# Patient Record
Sex: FEMALE | Race: WHITE | Employment: OTHER | URBAN - METROPOLITAN AREA
[De-identification: names, ages, dates, MRNs, and addresses within clinical notes are randomized per-mention and may not be internally consistent; named-entity substitution may affect disease eponyms.]

---

## 2017-01-05 ENCOUNTER — NURSING HOME VISIT (OUTPATIENT)
Dept: GERIATRICS | Facility: CLINIC | Age: 82
End: 2017-01-05
Payer: COMMERCIAL

## 2017-01-05 VITALS
WEIGHT: 141 LBS | TEMPERATURE: 98.9 F | BODY MASS INDEX: 25.78 KG/M2 | SYSTOLIC BLOOD PRESSURE: 134 MMHG | RESPIRATION RATE: 18 BRPM | HEART RATE: 79 BPM | OXYGEN SATURATION: 95 % | DIASTOLIC BLOOD PRESSURE: 80 MMHG

## 2017-01-05 DIAGNOSIS — R11.11 VOMITING WITHOUT NAUSEA, INTRACTABILITY OF VOMITING NOT SPECIFIED, UNSPECIFIED VOMITING TYPE: Primary | ICD-10-CM

## 2017-01-05 PROCEDURE — 99207 ZZC CDG-CORRECTLY CODED, REVIEWED AND AGREE: CPT | Performed by: NURSE PRACTITIONER

## 2017-01-05 PROCEDURE — 99308 SBSQ NF CARE LOW MDM 20: CPT | Performed by: NURSE PRACTITIONER

## 2017-01-05 RX ORDER — CALCIUM CARBONATE 500 MG/1
2 TABLET, CHEWABLE ORAL 3 TIMES DAILY PRN
COMMUNITY
End: 2017-10-01

## 2017-01-05 NOTE — PROGRESS NOTES
[place] [facility]    Chief Complaint   Patient presents with     Vomiting       HPI:    Maribel Sevilla is a 85 year old  (1931), who is being seen today for an episodic care visit at [facility].     Today's concern is:  Vomiting without nausea, intractability of vomiting not specified, unspecified vomiting type  Patient with increasing episodes of vomiting with meals; seen by SLP to assess for patient difficulty tolerating specific textures - no noted difficulty per SLP  Previously decreased patient's Prilosec per pharmacy recommendation - GERD may be contributing to current increased vomitting    BP Readin/70, 101/54, 116/64    REVIEW OF SYSTEMS:  4 point ROS including Respiratory, CV, GI and , other than that noted in the HPI,  is negative    /80 mmHg  Pulse 79  Temp(Src) 98.9  F (37.2  C)  Resp 18  Wt 141 lb (63.957 kg)  SpO2 95%  GENERAL APPEARANCE:  Alert, in no distress  Abdomen: Abdomen soft, non-tender. BS normal. No masses, organomegaly      ASSESSMENT/PLAN:  Vomiting without nausea, intractability of vomiting not specified, unspecified vomiting type  Prilosec 20mg BID  Monitor for ongoing vomitting    ASCENCION Rm CNP

## 2017-01-18 ENCOUNTER — TRANSFERRED RECORDS (OUTPATIENT)
Dept: HEALTH INFORMATION MANAGEMENT | Facility: CLINIC | Age: 82
End: 2017-01-18

## 2017-01-18 LAB
BUN SERPL-MCNC: 33 MG/DL (ref 9–26)
CALCIUM SERPL-MCNC: 9.5 MG/DL (ref 8.4–10.2)
CHLORIDE SERPLBLD-SCNC: 110 MMOL/L (ref 98–109)
CO2 SERPL-SCNC: 25 MMOL/L (ref 22–31)
CREAT SERPL-MCNC: 3.13 MG/DL (ref 0.55–1.02)
DIFFERENTIAL: ABNORMAL
ERYTHROCYTE [DISTWIDTH] IN BLOOD BY AUTOMATED COUNT: 12.9 % (ref 11–15)
GFR SERPL CREATININE-BSD FRML MDRD: 13 ML/MIN/1.73M2
GLUCOSE SERPL-MCNC: 98 MG/DL (ref 70–100)
HCT VFR BLD AUTO: 31 % (ref 35–46)
HEMOGLOBIN: 9.8 G/DL (ref 11.8–15.5)
MCV RBC AUTO: 97.8 FL (ref 80–100)
PLATELET # BLD AUTO: 215 K/CMM (ref 140–450)
POTASSIUM SERPL-SCNC: 4.2 MMOL/L (ref 3.5–5.2)
RBC # BLD AUTO: 3.17 M/CMM (ref 3.7–5.2)
SODIUM SERPL-SCNC: 144 MMOL/L (ref 136–145)
WBC # BLD AUTO: 5.1 K/CMM (ref 3.8–11)

## 2017-01-20 ENCOUNTER — NURSING HOME VISIT (OUTPATIENT)
Dept: GERIATRICS | Facility: CLINIC | Age: 82
End: 2017-01-20
Payer: COMMERCIAL

## 2017-01-20 ENCOUNTER — TRANSFERRED RECORDS (OUTPATIENT)
Dept: HEALTH INFORMATION MANAGEMENT | Facility: CLINIC | Age: 82
End: 2017-01-20

## 2017-01-20 VITALS
HEART RATE: 68 BPM | BODY MASS INDEX: 26.15 KG/M2 | WEIGHT: 143 LBS | OXYGEN SATURATION: 97 % | RESPIRATION RATE: 18 BRPM | SYSTOLIC BLOOD PRESSURE: 118 MMHG | DIASTOLIC BLOOD PRESSURE: 69 MMHG | TEMPERATURE: 96.5 F

## 2017-01-20 DIAGNOSIS — N18.4 CKD (CHRONIC KIDNEY DISEASE) STAGE 4, GFR 15-29 ML/MIN (H): ICD-10-CM

## 2017-01-20 DIAGNOSIS — R11.15 INTRACTABLE CYCLICAL VOMITING WITHOUT NAUSEA: Primary | ICD-10-CM
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
## 2017-01-20 LAB
BUN SERPL-MCNC: 25 MG/DL (ref 9–26)
CALCIUM SERPL-MCNC: 9.6 MG/DL (ref 8.4–10.2)
CHLORIDE SERPLBLD-SCNC: 112 MMOL/L (ref 98–109)
CO2 SERPL-SCNC: 27 MMOL/L (ref 22–31)
CREAT SERPL-MCNC: 2.31 MG/DL (ref 0.55–1.02)
GFR SERPL CREATININE-BSD FRML MDRD: 19 ML/MIN/1.73M2
GLUCOSE SERPL-MCNC: 121 MG/DL (ref 70–100)
POTASSIUM SERPL-SCNC: 4.4 MMOL/L (ref 3.5–5.2)
SODIUM SERPL-SCNC: 146 MMOL/L (ref 136–145)

## 2017-01-20 PROCEDURE — 99207 ZZC CDG-CORRECTLY CODED, REVIEWED AND AGREE: CPT | Performed by: NURSE PRACTITIONER

## 2017-01-20 PROCEDURE — 99309 SBSQ NF CARE MODERATE MDM 30: CPT | Performed by: NURSE PRACTITIONER

## 2017-01-20 NOTE — PROGRESS NOTES
[place] [facility]    Chief Complaint   Patient presents with     Fall     Vomiting       HPI:    Maribel Sevilla is a 85 year old  (12/20/1931), who is being seen today for an episodic care visit at [facility]. Today's concern is:  Intractable cyclical vomiting without nausea  Patient with ongoing vomiting with unknown cause; seen by SLP - r/o swallowing difficulties leading to vomiting.  Patient cannot recall the last time she had a bowel movement    Fall, subsequent encounter  Patient with repeated falls with minimal injury; suspected 2/2 dehydration given below noted BMP    CKD (chronic kidney disease) stage 4, GFR 15-29 ml/min (H)  Today's BMP showing mild improvement with fluid resuscitation    BMP from 1/18 showing decline in kidney function    Ref. Range 8/5/2016 00:00 1/18/2017 00:00   Sodium Latest Ref Range: 136-145 mmol/L 141 144   Potassium Latest Ref Range: 3.5-5.2 mmol/L 4.1 4.2   Chloride Latest Ref Range:  mmol/L 108 110 (A)   Carbon Dioxide Latest Ref Range: 22-31 mmol/L  25   Urea Nitrogen Latest Ref Range: 9-26 mg/dL 32 33 (A)   Creatinine Latest Ref Range: 0.55-1.02 mg/dL 2.36 3.13 (A)   GFR Estimate Latest Ref Range: >60 ml/min/1.73m2  13 (A)   GFR Estimate If Black Latest Ref Range: >60 ml/min/1.73m2 24 15 (A)   GFR Estimated (External) Unknown 21          ALLERGIES: Review of patient's allergies indicates no known allergies.  Past Medical, Surgical, Family and Social History reviewed and updated in [facility].    Current Outpatient Prescriptions   Medication Sig Dispense Refill     calcium carbonate (TUMS) 500 MG chewable tablet Take 2 chew tab by mouth 3 times daily as needed        omeprazole (PRILOSEC) 10 MG CR capsule Take 20 mg by mouth 2 times daily        lamoTRIgine (LAMICTAL) 200 MG tablet Take 300 mg by mouth daily & start (400mg) daily on 9/29/16.       mineral oil liquid Place into both ears At Bedtime 2 gtts to bilateral ears       QUEtiapine  (SEROQUEL) 50 MG tablet Take 200 mg by mouth At Bedtime        mirtazapine (REMERON) 15 MG tablet Take 30 mg by mouth At Bedtime        acetaminophen (TYLENOL) 500 MG tablet Take 500 mg by mouth 2 times daily and 1 tab every 4 hours as needed       senna-docusate (SENNA S) 8.6-50 MG per tablet Take 2 tablets by mouth At Bedtime       donepezil (ARICEPT) 5 MG tablet Take 10 mg by mouth daily  30 tablet 0     Cholecalciferol (VITAMIN D PO) Take 5,000 Units by mouth daily.       polyethylene glycol (MIRALAX/GLYCOLAX) packet Take 17 g by mouth daily. 28 each 1     Multiple Vitamins-Minerals (OCUVITE ADULT FORMULA) CAPS Take 1 tablet by mouth daily        Medications reviewed:  Medications reconciled to facility chart and changes were made to reflect current medications as identified as above med list. Below are the changes that were made:   Medications stopped since last EPIC medication reconciliation:   There are no discontinued medications.    Medications started since last [facility] medication reconciliation:  No orders of the defined types were placed in this encounter.     REVIEW OF SYSTEMS:  4 point ROS including Respiratory, CV, GI and , other than that noted in the HPI,  is negative    Physical Exam:  /69 mmHg  Pulse 68  Temp(Src) 96.5  F (35.8  C)  Resp 18  Wt 143 lb (64.864 kg)  SpO2 97%     BP Readin/77, 117/73, 104/59  GENERAL APPEARANCE:  Alert, in no distress, oriented, cooperative elderly woman laying in bed  ENT:  Mouth and posterior oropharynx normal, moist mucous membranes, [place]  EYES:  EOM, conjunctivae, lids, pupils and irises normal, wears glasses  NECK:  No adenopathy, masses or thyromegaly  RESP:  Respiratory effort and palpation of chest normal, lungs clear to auscultation, no respiratory distress  CV:  Palpation and auscultation of heart done, regular rate and rhythm, no murmur, rub, or gallop, no edema, +2 pedal pulses  ABDOMEN: Hyperactive upper bowel sounds, hypoactive lower  bowel sounds, soft, nontender, no hepatosplenomegaly or other masses  M/S:   Gait and station normal; Digits and nails abnormal - arthritic changes present  NEURO:   Cranial nerves 2-12 are normal tested and grossly at patient's baseline  PSYCH:  oriented X 3, normal insight, judgement and memory, affect and mood concerned    Recent Labs:      Last Basic Metabolic Panel:  NA      144   1/18/2017   POTASSIUM      4.2   1/18/2017  CHLORIDE      110   1/18/2017  RILEY      9.5   1/18/2017  CO2       25   1/18/2017  BUN       33   1/18/2017  CR     3.13   1/18/2017  GLC       98   1/18/2017    WBC      5.1   1/18/2017  RBC     3.17   1/18/2017  HGB      9.8   1/18/2017  HCT     31.0   1/18/2017  MCV     97.8   1/18/2017  MCH     31.3   9/2/2015  MCHC     32.6   9/2/2015  RDW     12.9   1/18/2017  PLT      215   1/18/2017      Assessment/Plan:  Intractable cyclical vomiting without nausea  Zofran 4mg q6h PRN  Abd Xray to r/o constipation    Fall, subsequent encounter  Monitor patient closely; SBA with avtivities/mobility/transferring    CKD (chronic kidney disease) stage 4, GFR 15-29 ml/min (H)  NaCl 0.9% @ 75cc/hr   Recheck BMP after completion      Electronically signed by  ASCENCION Rm CNP

## 2017-01-23 ENCOUNTER — TRANSFERRED RECORDS (OUTPATIENT)
Dept: HEALTH INFORMATION MANAGEMENT | Facility: CLINIC | Age: 82
End: 2017-01-23

## 2017-01-23 LAB
BUN SERPL-MCNC: 22 MG/DL (ref 9–26)
CALCIUM SERPL-MCNC: 10 MG/DL (ref 8.4–10.2)
CHLORIDE SERPLBLD-SCNC: 111 MMOL/L (ref 98–109)
CO2 SERPL-SCNC: 23 MMOL/L (ref 22–31)
CREAT SERPL-MCNC: 2.25 MG/DL (ref 0.55–1.02)
GFR SERPL CREATININE-BSD FRML MDRD: 19 ML/MIN/1.73M2
GLUCOSE SERPL-MCNC: 129 MG/DL (ref 70–100)
POTASSIUM SERPL-SCNC: 4.1 MMOL/L (ref 3.5–5.2)
SODIUM SERPL-SCNC: 145 MMOL/L (ref 136–145)

## 2017-01-30 PROBLEM — R11.15 INTRACTABLE CYCLICAL VOMITING WITHOUT NAUSEA: Status: ACTIVE | Noted: 2017-01-30
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
## 2017-02-03 RX ORDER — ONDANSETRON 4 MG/1
4 TABLET, FILM COATED ORAL 4 TIMES DAILY
COMMUNITY
End: 2024-09-17

## 2017-02-04 RX ORDER — GUAIFENESIN/DEXTROMETHORPHAN 100-10MG/5
5 SYRUP ORAL EVERY 4 HOURS PRN
Qty: 560 ML | Status: CANCELLED
Start: 2017-02-04

## 2017-02-06 ENCOUNTER — NURSING HOME VISIT (OUTPATIENT)
Dept: GERIATRICS | Facility: CLINIC | Age: 82
End: 2017-02-06
Payer: COMMERCIAL

## 2017-02-06 ENCOUNTER — TRANSFERRED RECORDS (OUTPATIENT)
Dept: HEALTH INFORMATION MANAGEMENT | Facility: CLINIC | Age: 82
End: 2017-02-06

## 2017-02-06 VITALS
OXYGEN SATURATION: 96 % | HEART RATE: 80 BPM | DIASTOLIC BLOOD PRESSURE: 78 MMHG | SYSTOLIC BLOOD PRESSURE: 121 MMHG | RESPIRATION RATE: 20 BRPM | TEMPERATURE: 96.5 F

## 2017-02-06 DIAGNOSIS — R11.10 VOMITING, INTRACTABILITY OF VOMITING NOT SPECIFIED, PRESENCE OF NAUSEA NOT SPECIFIED, UNSPECIFIED VOMITING TYPE: Primary | ICD-10-CM

## 2017-02-06 DIAGNOSIS — N18.4 CHRONIC KIDNEY DISEASE, STAGE IV (SEVERE) (H): ICD-10-CM

## 2017-02-06 DIAGNOSIS — F31.9 BIPOLAR AFFECTIVE DISORDER, REMISSION STATUS UNSPECIFIED (H): ICD-10-CM

## 2017-02-06 LAB
BUN SERPL-MCNC: 31 MG/DL (ref 9–26)
CALCIUM SERPL-MCNC: 9.7 MG/DL (ref 8.4–10.2)
CHLORIDE SERPLBLD-SCNC: 109 MMOL/L (ref 98–109)
CO2 SERPL-SCNC: 25 MMOL/L (ref 22–31)
CREAT SERPL-MCNC: 2.77 MG/DL (ref 0.55–1.02)
GFR SERPL CREATININE-BSD FRML MDRD: 15 ML/MIN/1.73M2
GLUCOSE SERPL-MCNC: 111 MG/DL (ref 70–100)
POTASSIUM SERPL-SCNC: 4.4 MMOL/L (ref 3.5–5.2)
SODIUM SERPL-SCNC: 144 MMOL/L (ref 136–145)

## 2017-02-06 PROCEDURE — 99207 ZZC CDG-CORRECTLY CODED, REVIEWED AND AGREE: CPT | Performed by: INTERNAL MEDICINE

## 2017-02-06 PROCEDURE — 99309 SBSQ NF CARE MODERATE MDM 30: CPT | Performed by: INTERNAL MEDICINE

## 2017-02-06 NOTE — PROGRESS NOTES
[place] [facility]  Nursing Home Regulatory Visit  February 6, 2017    Chief Complaint   Patient presents with     [facility] Regulatory       HPI:    Maribel Sevilla is a 85 year old  (12/20/1931), who is being seen today for a federally mandated E/M visit at [facility]. Today's concerns are:    1) Emesis -- occurs after meals. Nursing has been giving ondansetron 45 min prior to meals and seems to help. No emesis other times of the day. Only med change since I last saw her is increase in quetiapine from 150 mg qhs to 200 mg qhs as well as PPI changed to H2 blocker.   2) CKD, Stage IV -- baseline Cr has increased from 1.6-1.9 to 2.2-2.5 in past year. GFR around 19. Unclear etiology of her CKD - no DM nor HTN.   3) Bipolar Disorder -- cycling down right now, spending more time in bed - her usual cyclical pattern. Follows with in-house psychiatry and managed with lamotrigine 400 mg qhs, mirtazapine 30 mg qhs and quetiapine 200 mg qhs    ALLERGIES: Review of patient's allergies indicates no known allergies.    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 Mother      ?     C.A.D. Father      ?     C.A.D. Sister      ?     C.A.D. Brother      ?     Psychotic Disorder Son      suicide       SOCIAL HISTORY:   Lives in a SNF    MEDICATIONS:  Current Outpatient Prescriptions   Medication Sig Dispense Refill     ranitidine (RANITIDINE) 150 MG tablet Take 150 mg by mouth daily       ondansetron  (ZOFRAN) 4 MG tablet Take by mouth 4 times daily       calcium carbonate (TUMS) 500 MG chewable tablet Take 2 chew tab by mouth 3 times daily as needed        lamoTRIgine (LAMICTAL) 200 MG tablet Take 300 mg by mouth daily & start (400mg) daily on 9/29/16.       mineral oil liquid Place into both ears At Bedtime 2 gtts to bilateral ears       QUEtiapine (SEROQUEL) 50 MG tablet Take 200 mg by mouth At Bedtime        mirtazapine (REMERON) 15 MG tablet Take 30 mg by mouth At Bedtime        acetaminophen (TYLENOL) 500 MG tablet Take 500 mg by mouth 2 times daily and 1 tab every 4 hours as needed       senna-docusate (SENNA S) 8.6-50 MG per tablet Take 2 tablets by mouth At Bedtime       donepezil (ARICEPT) 5 MG tablet Take 10 mg by mouth daily  30 tablet 0     Cholecalciferol (VITAMIN D PO) Take 5,000 Units by mouth daily.       polyethylene glycol (MIRALAX/GLYCOLAX) packet Take 17 g by mouth daily. 28 each 1     Multiple Vitamins-Minerals (OCUVITE ADULT FORMULA) CAPS Take 1 tablet by mouth daily          Medications reviewed:  Medications reconciled to facility chart and changes were made to reflect current medications as identified as above med list. Below are the changes that were made:   Medications stopped since last EPIC medication reconciliation:   Medications Discontinued During This Encounter   Medication Reason     omeprazole (PRILOSEC) 10 MG CR capsule Discontinued by another Health Care Provider     Medications started since last [facility] medication reconciliation:  Orders Placed This Encounter   Medications     ranitidine (RANITIDINE) 150 MG tablet     Sig: Take 150 mg by mouth daily     ondansetron (ZOFRAN) 4 MG tablet     Sig: Take by mouth 4 times daily       Case Management:  I have reviewed the care plan and MDS and do agree with the plan.   Information reviewed:  Medications, vital signs, orders, and nursing notes.    ROS:  Unable to be obtained due to cognitive impairment or aphasia.     PHYSICAL  EXAM:  /78 mmHg  Pulse 80  Temp(Src) 96.5  F (35.8  C)  Resp 20  SpO2 96%  Gen: laying in bed, alert, cooperative and in no acute distress  Card: RRR, S1, S2, no murmurs  Resp: lungs clear to auscultation bilaterally  GI: abdomen soft, not-tender  Ext: no LE edema  Neuro: CX II-XII grossly in tact; ROM in all four extremities grossly in tact  Psych: memory, judgement and insight impaired    Lab/Diagnostic data:  Reviewed as per Epic    ASSESSMENT/PLAN    1) Emesis   Occurs after meals. Nursing has been giving ondansetron 45 min prior to meals and seems to help. No emesis other times of the day. Differential includes secondary to medications (dose increase in quetiapine); biliary disease, gastroparesis or gastric outlet obstruction.   -- would obtain LFTs to eval for biliary process and if abnormal order abdominal U/S   -- consider upper GI/barium swallow to assess if any gastric outlet obstruction  -- consider trial of metoclopramide if daughter does not wish invasive testing  -- consider switch back to BID PPI  -- continue scheduled ondansetron as it is helping    2) CKD, Stage IV   Baseline Cr has increased from 1.6-1.9 to 2.2-2.5 in past year. GFR around 19. Unclear etiology of her CKD - no DM nor HTN. Doubt renal function is contributing to her emesis as she is not uremic.   -- would get a phosphate level  -- follow q6 mo renal labs    3) Bipolar Disorder   Cycling down right now, spending more time in bed - her usual cyclical pattern. Follows with in-house psychiatry   -- continues on lamotrigine 400 mg qhs, mirtazapine 30 mg qhs and quetiapine 200 mg qhs  -- ongoing management per psychiatry - appreciate their expertise  -- supportive cares       Electronically signed by:  Felecia Elizabeth MD

## 2017-02-09 ENCOUNTER — NURSING HOME VISIT (OUTPATIENT)
Dept: GERIATRICS | Facility: CLINIC | Age: 82
End: 2017-02-09
Payer: COMMERCIAL

## 2017-02-09 VITALS
DIASTOLIC BLOOD PRESSURE: 77 MMHG | HEART RATE: 78 BPM | TEMPERATURE: 97.1 F | OXYGEN SATURATION: 96 % | WEIGHT: 136.3 LBS | RESPIRATION RATE: 20 BRPM | SYSTOLIC BLOOD PRESSURE: 133 MMHG | BODY MASS INDEX: 24.92 KG/M2

## 2017-02-09 DIAGNOSIS — R53.83 FATIGUE DUE TO DEPRESSION: Primary | ICD-10-CM

## 2017-02-09 DIAGNOSIS — F31.4 BIPOLAR DISORDER, CURRENT EPISODE DEPRESSED, SEVERE, WITHOUT PSYCHOTIC FEATURES (H): ICD-10-CM

## 2017-02-09 DIAGNOSIS — R11.15 NON-INTRACTABLE CYCLICAL VOMITING WITHOUT NAUSEA: ICD-10-CM

## 2017-02-09 DIAGNOSIS — F32.A FATIGUE DUE TO DEPRESSION: Primary | ICD-10-CM

## 2017-02-09 PROCEDURE — 99207 ZZC CDG-CORRECTLY CODED, REVIEWED AND AGREE: CPT | Performed by: NURSE PRACTITIONER

## 2017-02-09 PROCEDURE — 99309 SBSQ NF CARE MODERATE MDM 30: CPT | Performed by: NURSE PRACTITIONER

## 2017-02-09 NOTE — PROGRESS NOTES
[place] [facility]    Chief Complaint   Patient presents with     Fatigue       HPI:    Maribel Sevilla is a 85 year old  (1931), who is being seen today for an episodic care visit at [facility].     Today's concern is:  Fatigue due to depression/Bipolar disorder, current episode depressed, severe, without psychotic features (H)  Patient appearing to be increasingly fatigued, suspect this may be 2/2 to her BPD - is followed by in-house psych; treatment can be very touchy as patient is sensitive to adjustments and becomes very sedated rather quickly with small medication changes. Was seen by them yesterday and started on Zoloft 50mg daily.    Non-intractable cyclical vomiting without nausea  Vomiting has improved with scheduling of Zofran; still remain unsure of cause  Discussion with daughter, she is interested in upper GI for further investigation of potential causes of vomiting.     BP Readin/78, 112/69, 114/73    REVIEW OF SYSTEMS:  4 point ROS including Respiratory, CV, GI and , other than that noted in the HPI,  is negative    /77 mmHg  Pulse 78  Temp(Src) 97.1  F (36.2  C)  Resp 20  Wt 136 lb 4.8 oz (61.825 kg)  SpO2 96%  GENERAL APPEARANCE:  Alert, in no distress, oriented, cooperative elderly woman laying in bed  ENT:  Mouth and posterior oropharynx normal, moist mucous membranes, [place]  EYES:  EOM, conjunctivae, lids, pupils and irises normal, wears glasses  NECK:  No adenopathy, masses or thyromegaly  RESP:  Respiratory effort and palpation of chest normal, lungs clear to auscultation, no respiratory distress  CV:  Palpation and auscultation of heart done, regular rate and rhythm, no murmur, rub, or gallop, no edema, +2 pedal pulses  ABDOMEN: Hyperactive upper bowel sounds, hypoactive lower bowel sounds, soft, nontender, no hepatosplenomegaly or other masses  M/S:   Gait and station normal; Digits and nails abnormal - arthritic changes present  NEURO:    Cranial nerves 2-12 are normal tested and grossly at patient's baseline  PSYCH:  oriented X 3, normal insight, judgement and memory, affect and mood concerned    ASSESSMENT/PLAN:  Fatigue due to depression/Bipolar disorder, current episode depressed, severe, without psychotic features (H)  Continue with POC per psych  Monitor patient behaviors with addition of Zoloft    Non-intractable cyclical vomiting without nausea  Schedule patient for EGD for further diangoses    Bri Wiley, ASCENCION CNP

## 2017-02-16 ENCOUNTER — HOSPITAL ENCOUNTER (OUTPATIENT)
Dept: GENERAL RADIOLOGY | Facility: CLINIC | Age: 82
Discharge: HOME | End: 2017-02-16
Attending: INTERNAL MEDICINE | Admitting: INTERNAL MEDICINE
Payer: COMMERCIAL

## 2017-02-16 DIAGNOSIS — R11.2 NAUSEA AND VOMITING: ICD-10-CM

## 2017-02-16 PROCEDURE — 74240 X-RAY XM UPR GI TRC 1CNTRST: CPT

## 2017-02-23 ENCOUNTER — NURSING HOME VISIT (OUTPATIENT)
Dept: GERIATRICS | Facility: CLINIC | Age: 82
End: 2017-02-23
Payer: COMMERCIAL

## 2017-02-23 VITALS
RESPIRATION RATE: 20 BRPM | OXYGEN SATURATION: 95 % | SYSTOLIC BLOOD PRESSURE: 133 MMHG | TEMPERATURE: 98 F | HEART RATE: 67 BPM | DIASTOLIC BLOOD PRESSURE: 72 MMHG

## 2017-02-23 DIAGNOSIS — Z98.890 HISTORY OF ESOPHAGOGASTRODUODENOSCOPY (EGD): ICD-10-CM

## 2017-02-23 DIAGNOSIS — F31.4 BIPOLAR DISORDER, CURRENT EPISODE DEPRESSED, SEVERE, WITHOUT PSYCHOTIC FEATURES (H): Primary | ICD-10-CM

## 2017-02-23 DIAGNOSIS — R11.15 NON-INTRACTABLE CYCLICAL VOMITING WITHOUT NAUSEA: ICD-10-CM

## 2017-02-23 PROCEDURE — 99309 SBSQ NF CARE MODERATE MDM 30: CPT | Performed by: NURSE PRACTITIONER

## 2017-02-23 NOTE — PROGRESS NOTES
[place] [facility]    Chief Complaint   Patient presents with     Vomiting       HPI:    Maribel Sevilla is a 85 year old  (12/20/1931), who is being seen today for an episodic care visit at [facility]. Today's concern is:  Bipolar disorder, current episode depressed, severe, without psychotic features (H)  Patient doing well today, has been up out of bed    Non-intractable cyclical vomiting without nausea/History of esophagogastroduodenoscopy (EGD)  Upper GI completed on 2/16 showing no evidence of gastric outlet obstruction potentially causing vomiting.  Patient's vomiting much improved, she is requesting to have scheduled Zofran changed to PRN      ALLERGIES: Review of patient's allergies indicates no known allergies.  Past Medical, Surgical, Family and Social History reviewed and updated in [facility].    Current Outpatient Prescriptions   Medication Sig Dispense Refill     sertraline (ZOLOFT) 50 MG tablet Take 50 mg by mouth daily       ranitidine (RANITIDINE) 150 MG tablet Take 150 mg by mouth daily       ondansetron (ZOFRAN) 4 MG tablet Take 4 mg by mouth 4 times daily        calcium carbonate (TUMS) 500 MG chewable tablet Take 2 chew tab by mouth 3 times daily as needed        lamoTRIgine (LAMICTAL) 200 MG tablet Take 400 mg by mouth daily        mineral oil liquid Place into both ears At Bedtime 2 gtts to bilateral ears       QUEtiapine (SEROQUEL) 50 MG tablet Take 200 mg by mouth At Bedtime        mirtazapine (REMERON) 15 MG tablet Take 30 mg by mouth At Bedtime        acetaminophen (TYLENOL) 500 MG tablet Take 500 mg by mouth 2 times daily and 1 tab every 4 hours as needed       senna-docusate (SENNA S) 8.6-50 MG per tablet Take 2 tablets by mouth At Bedtime       donepezil (ARICEPT) 5 MG tablet Take 10 mg by mouth daily  30 tablet 0     Cholecalciferol (VITAMIN D PO) Take 5,000 Units by mouth daily.       polyethylene glycol (MIRALAX/GLYCOLAX) packet Take 17 g by mouth daily. 28  each 1     Multiple Vitamins-Minerals (OCUVITE ADULT FORMULA) CAPS Take 1 tablet by mouth daily        Medications reviewed:  Medications reconciled to facility chart and changes were made to reflect current medications as identified as above med list. Below are the changes that were made:   Medications stopped since last EPIC medication reconciliation:   There are no discontinued medications.    Medications started since last [facility] medication reconciliation:  No orders of the defined types were placed in this encounter.    REVIEW OF SYSTEMS:  4 point ROS including Respiratory, CV, GI and , other than that noted in the HPI,  is negative    Physical Exam:  /72  Pulse 67  Temp 98  F (36.7  C)  Resp 20  SpO2 95%   BP Readin/62, 117/70, 101/78  GENERAL APPEARANCE:  Alert, in no distress, oriented, cooperative elderly woman   ENT:  Mouth and posterior oropharynx normal, moist mucous membranes, [place]  EYES:  EOM, conjunctivae, lids, pupils and irises normal, wears glasses  NECK:  No adenopathy, masses or thyromegaly  RESP:  Respiratory effort and palpation of chest normal, lungs clear to auscultation, no respiratory distress  CV:  Palpation and auscultation of heart done, regular rate and rhythm, no murmur, rub, or gallop, no edema, +2 pedal pulses  ABDOMEN: Active bowel sounds x4, soft, nontender, no hepatosplenomegaly or other masses  M/S:   Gait and station normal; Digits and nails abnormal - arthritic changes present  NEURO:   Cranial nerves 2-12 are normal tested and grossly at patient's baseline  PSYCH:  oriented X 3, normal insight, judgement and memory, affect and mood concerned    Recent Labs:      Last Basic Metabolic Panel:  Lab Results   Component Value Date     2017      Lab Results   Component Value Date    POTASSIUM 4.4 2017     Lab Results   Component Value Date    CHLORIDE 109 2017     Lab Results   Component Value Date    RILEY 9.7 2017     Lab Results   Component  Value Date    CO2 25 02/06/2017     Lab Results   Component Value Date    BUN 31 02/06/2017     Lab Results   Component Value Date    CR 2.77 02/06/2017     Lab Results   Component Value Date     02/06/2017       Lab Results   Component Value Date    WBC 5.1 01/18/2017     Lab Results   Component Value Date    RBC 3.17 01/18/2017     Lab Results   Component Value Date    HGB 9.8 01/18/2017     Lab Results   Component Value Date    HCT 31.0 01/18/2017     Lab Results   Component Value Date    MCV 97.8 01/18/2017     Lab Results   Component Value Date    MCH 31.3 09/02/2015     Lab Results   Component Value Date    MCHC 32.6 09/02/2015     Lab Results   Component Value Date    RDW 12.9 01/18/2017     Lab Results   Component Value Date     01/18/2017             Assessment/Plan:  Bipolar disorder, current episode depressed, severe, without psychotic features (H)  Stable on current regimen; continue medications as currently ordered  Continue in-house involvement    Non-intractable cyclical vomiting without nausea/History of esophagogastroduodenoscopy (EGD)  Change Zofran to PRN  Monitor    Electronically signed by  ASCENCION Rm CNP

## 2017-02-27 PROBLEM — Z98.890 HISTORY OF ESOPHAGOGASTRODUODENOSCOPY (EGD): Status: ACTIVE | Noted: 2017-02-27

## 2017-04-13 ENCOUNTER — NURSING HOME VISIT (OUTPATIENT)
Dept: GERIATRICS | Facility: CLINIC | Age: 82
End: 2017-04-13
Payer: COMMERCIAL

## 2017-04-13 VITALS
HEART RATE: 69 BPM | DIASTOLIC BLOOD PRESSURE: 62 MMHG | WEIGHT: 135 LBS | TEMPERATURE: 98 F | OXYGEN SATURATION: 98 % | RESPIRATION RATE: 18 BRPM | BODY MASS INDEX: 24.69 KG/M2 | SYSTOLIC BLOOD PRESSURE: 107 MMHG

## 2017-04-13 DIAGNOSIS — F33.2 SEVERE EPISODE OF RECURRENT MAJOR DEPRESSIVE DISORDER, WITHOUT PSYCHOTIC FEATURES (H): ICD-10-CM

## 2017-04-13 DIAGNOSIS — N18.30 CKD (CHRONIC KIDNEY DISEASE) STAGE 3, GFR 30-59 ML/MIN (H): Primary | ICD-10-CM

## 2017-04-13 DIAGNOSIS — K59.01 CONSTIPATION, SLOW TRANSIT: ICD-10-CM

## 2017-04-13 DIAGNOSIS — F31.4 BIPOLAR DISORDER, CURRENT EPISODE DEPRESSED, SEVERE, WITHOUT PSYCHOTIC FEATURES (H): ICD-10-CM

## 2017-04-13 PROCEDURE — 99207 ZZC CDG-CORRECTLY CODED, REVIEWED AND AGREE: CPT | Performed by: NURSE PRACTITIONER

## 2017-04-13 PROCEDURE — 99309 SBSQ NF CARE MODERATE MDM 30: CPT | Performed by: NURSE PRACTITIONER

## 2017-04-13 NOTE — PROGRESS NOTES
[facility]    Chief Complaint   Patient presents with     prison Regulatory       HPI:    Maribel Sevilla is a 85 year old  (12/20/1931), who is being seen today for a federally mandated E/M visit at [facility]. Today's concerns are:  CKD (chronic kidney disease) stage 3, GFR 30-59 ml/min  BMP as noted below  Medication adjustments being made as necessrary    Severe episode of recurrent major depressive disorder, without psychotic features (H)/Bipolar disorder, current episode depressed, severe, without psychotic features (H)  Patient pleasantly confused with minimal behaviors; very friendly  Last PHQ9: 09/27  Patient on regimen of Aricept, Lamictal, Remeron, Seroquel and Zoloft  Is followed by in-house psych        Constipation, slow transit  Today patient notes that she has no c/o constipation  She is on a regimen of Miralax and Senna-S  Last documented BM large today.        ALLERGIES: No known allergies  PAST MEDICAL HISTORY:  has a past medical history of Anemia; Anxiety; Bipolar affective (H); Dementia; Depressive disorder; Gastro-oesophageal reflux disease; OA (osteoarthritis) of knee; and Renal insufficiencies, chronic.  PAST SURGICAL HISTORY:  has a past surgical history that includes Salpingo-oophorectomy bilateral; Hysterectomy; and Eye surgery.  FAMILY HISTORY: family history includes C.A.D. in her brother, father, mother, and sister; Psychotic Disorder in her son.  SOCIAL HISTORY:  reports that she has never smoked. She has never used smokeless tobacco. She reports that she does not drink alcohol or use illicit drugs.    MEDICATIONS:  Current Outpatient Prescriptions   Medication Sig Dispense Refill     sertraline (ZOLOFT) 50 MG tablet Take 50 mg by mouth daily       ranitidine (RANITIDINE) 150 MG tablet Take 150 mg by mouth daily       ondansetron (ZOFRAN) 4 MG tablet Take 4 mg by mouth 4 times daily        calcium carbonate (TUMS) 500 MG chewable tablet  Take 2 chew tab by mouth 3 times daily as needed        lamoTRIgine (LAMICTAL) 200 MG tablet Take 400 mg by mouth daily        mineral oil liquid Place into both ears At Bedtime 2 gtts to bilateral ears       QUEtiapine (SEROQUEL) 50 MG tablet Take 200 mg by mouth At Bedtime        mirtazapine (REMERON) 15 MG tablet Take 30 mg by mouth At Bedtime        acetaminophen (TYLENOL) 500 MG tablet Take 500 mg by mouth 2 times daily and 1 tab every 4 hours as needed       senna-docusate (SENNA S) 8.6-50 MG per tablet Take 2 tablets by mouth At Bedtime       donepezil (ARICEPT) 5 MG tablet Take 10 mg by mouth daily  30 tablet 0     Cholecalciferol (VITAMIN D PO) Take 5,000 Units by mouth daily.       polyethylene glycol (MIRALAX/GLYCOLAX) packet Take 17 g by mouth daily. 28 each 1     Multiple Vitamins-Minerals (OCUVITE ADULT FORMULA) CAPS Take 1 tablet by mouth daily        Medications reviewed:  Medications reconciled to facility chart and changes were made to reflect current medications as identified as above med list. Below are the changes that were made:   Medications stopped since last EPIC medication reconciliation:   There are no discontinued medications.    Medications started since last [facility] medication reconciliation:  No orders of the defined types were placed in this encounter.    Case Management:  I have reviewed the care plan and MDS and do agree with the plan. Patient's desire to return to the community is present, but is not able due to care needs .  Information reviewed:  Medications, vital signs, orders, and nursing notes.    ROS:  4 point ROS including Respiratory, CV, GI and , other than that noted in the HPI,  is negative    Exam:  /62  Pulse 69  Temp 98  F (36.7  C)  Resp 18  Wt 135 lb (61.2 kg)  SpO2 98%  BMI 24.69 kg/m2   BP Readin/84, 103/61, 107/63  GENERAL APPEARANCE:  Alert, in no distress, oriented, cooperative elderly woman resting in bed  ENT:  Mouth and posterior  oropharynx normal, moist mucous membranes, [place]  EYES:  EOM, conjunctivae, lids, pupils and irises normal, wears glasses  NECK:  No adenopathy, masses or thyromegaly  RESP:  Respiratory effort and palpation of chest normal, lungs clear to auscultation, no respiratory distress  CV:  Palpation and auscultation of heart done, regular rate and rhythm, no murmur, rub, or gallop, no edema, +2 pedal pulses  ABDOMEN: Active bowel sounds x4, soft, nontender, no hepatosplenomegaly or other masses  M/S:   Gait and station normal; Digits and nails abnormal - arthritic changes present  NEURO:   Cranial nerves 2-12 are normal tested and grossly at patient's baseline  PSYCH:  oriented X 3, normal insight, judgement and memory, affect and mood Pleasant    Lab/Diagnostic data:      CBC RESULTS:   Recent Labs   Lab Test 01/18/17 08/05/16  09/02/15   1933   07/19/12   1225   WBC  5.1  7.1  7.7   < >  5.6   RBC  3.17*  3.95  3.67*   < >  3.91   HGB  9.8*  12.1  11.5*   < >  12.2   HCT  31.0*  38.7  35.3   < >  36.6   MCV  97.8  98.0  96   < >  94   MCH   --    --   31.3   --   31.2   MCHC   --    --   32.6   --   33.3   RDW  12.9  12.8  12.7   < >  12.2   PLT  215  318  249   < >  173    < > = values in this interval not displayed.       Last Basic Metabolic Panel:  Recent Labs   Lab Test 02/06/17 01/23/17   NA  144  145   POTASSIUM  4.4  4.1   CHLORIDE  109  111*   RILEY  9.7  10.0   CO2  25  23   BUN  31*  22   CR  2.77*  2.25*   GLC  111*  129*       Liver Function Studies -   Recent Labs   Lab Test  09/08/11   0705 09/07/11   1910   PROTTOTAL  6.2*  7.1   ALBUMIN  3.5  4.0   BILITOTAL  0.5  0.6   ALKPHOS  86  101   AST  33  39   ALT  25  25         ASSESSMENT/PLAN  CKD (chronic kidney disease) stage 3, GFR 30-59 ml/min  Stable on current regimen; continue medications as currently ordered.    Severe episode of recurrent major depressive disorder, without psychotic features (H)/Bipolar disorder, current episode depressed, severe,  without psychotic features (H)  As noted above  Continue management per in-house psych    Constipation, slow transit  Stable on current regimen; continue medications as currently ordered.    Total time spent with patient visit was 35 min including patient visit and review of past records.Greater than 50% of total time spent with counseling and coordinating care.    Electronically signed by:  ASCENCION Salmeron CNP

## 2017-04-20 ENCOUNTER — NURSING HOME VISIT (OUTPATIENT)
Dept: GERIATRICS | Facility: CLINIC | Age: 82
End: 2017-04-20
Payer: COMMERCIAL

## 2017-04-20 VITALS
SYSTOLIC BLOOD PRESSURE: 108 MMHG | BODY MASS INDEX: 24.14 KG/M2 | RESPIRATION RATE: 18 BRPM | WEIGHT: 132 LBS | DIASTOLIC BLOOD PRESSURE: 68 MMHG | TEMPERATURE: 98.7 F | HEART RATE: 70 BPM | OXYGEN SATURATION: 92 %

## 2017-04-20 DIAGNOSIS — F31.4 BIPOLAR DISORDER, CURRENT EPISODE DEPRESSED, SEVERE, WITHOUT PSYCHOTIC FEATURES (H): Primary | ICD-10-CM

## 2017-04-20 DIAGNOSIS — R63.4 WEIGHT LOSS: ICD-10-CM

## 2017-04-20 DIAGNOSIS — F33.2 SEVERE EPISODE OF RECURRENT MAJOR DEPRESSIVE DISORDER, WITHOUT PSYCHOTIC FEATURES (H): ICD-10-CM

## 2017-04-20 PROCEDURE — 99207 ZZC CDG-CORRECTLY CODED, REVIEWED AND AGREE: CPT | Performed by: NURSE PRACTITIONER

## 2017-04-20 PROCEDURE — 99309 SBSQ NF CARE MODERATE MDM 30: CPT | Performed by: NURSE PRACTITIONER

## 2017-04-20 NOTE — PROGRESS NOTES
[facility]    Chief Complaint   Patient presents with     Weight Loss     Depression       HPI:    Mairbel Sevilla is a 85 year old  (12/20/1931), who is being seen today for an episodic care visit at [facility]. Today's concern is:  Bipolar disorder, current episode depressed, severe, without psychotic features (H)/Severe episode of recurrent major depressive disorder, without psychotic features (H)  Patient with waxing/waning sx of depression - some days she spends the day in bed and doesn't eat meals; however, she will drink liquids  She is followed by in-house psych    Weight loss  Patient with noted weight loss  Current weight 132lbs; this is a weight loss of almost 10lbs over the past year       ALLERGIES: No known allergies  Past Medical, Surgical, Family and Social History reviewed and updated in EPIC.    Current Outpatient Prescriptions   Medication Sig Dispense Refill     sertraline (ZOLOFT) 50 MG tablet Take 50 mg by mouth daily       ranitidine (RANITIDINE) 150 MG tablet Take 150 mg by mouth daily       ondansetron (ZOFRAN) 4 MG tablet Take 4 mg by mouth 4 times daily        calcium carbonate (TUMS) 500 MG chewable tablet Take 2 chew tab by mouth 3 times daily as needed        lamoTRIgine (LAMICTAL) 200 MG tablet Take 300 mg by mouth daily        mineral oil liquid Place into both ears At Bedtime 2 gtts to bilateral ears       QUEtiapine (SEROQUEL) 50 MG tablet Take 150 mg by mouth At Bedtime        mirtazapine (REMERON) 15 MG tablet Take 30 mg by mouth At Bedtime        acetaminophen (TYLENOL) 500 MG tablet Take 500 mg by mouth 2 times daily and 1 tab every 4 hours as needed       senna-docusate (SENNA S) 8.6-50 MG per tablet Take 2 tablets by mouth At Bedtime       donepezil (ARICEPT) 5 MG tablet Take 10 mg by mouth daily  30 tablet 0     Cholecalciferol (VITAMIN D PO) Take 5,000 Units by mouth daily.       polyethylene glycol (MIRALAX/GLYCOLAX) packet Take 17 g by  mouth daily. 28 each 1     Multiple Vitamins-Minerals (OCUVITE ADULT FORMULA) CAPS Take 1 tablet by mouth daily          REVIEW OF SYSTEMS:  4 point ROS including Respiratory, CV, GI and , other than that noted in the HPI,  is negative    BP Readin/66, 101/79, 112/84    Physical Exam:  /68  Pulse 70  Temp 98.7  F (37.1  C)  Resp 18  Wt 132 lb (59.9 kg)  SpO2 92%  BMI 24.14 kg/m2  GENERAL APPEARANCE:  Alert, in no distress, oriented, cooperative elderly woman resting in bed  ENT:  Mouth and posterior oropharynx normal, moist mucous membranes, [place]  EYES:  EOM, conjunctivae, lids, pupils and irises normal  NECK:  No adenopathy, masses or thyromegaly  RESP:  Respiratory effort and palpation of chest normal, lungs clear to auscultation, no respiratory distress  CV:  Palpation and auscultation of heart done, regular rate and rhythm, no murmur, rub, or gallop, no edema, +2 pedal pulses  ABDOMEN: Active bowel sounds x4, soft, nontender, no hepatosplenomegaly or other masses  M/S:   Gait and station normal; Digits and nails abnormal - arthritic changes present  NEURO:   Cranial nerves 2-12 are normal tested and grossly at patient's baseline  PSYCH:  oriented X 3, normal insight, judgement and memory, affect and mood Pleasant    Recent Labs:    Last Basic Metabolic Panel:  Lab Results   Component Value Date     2017      Lab Results   Component Value Date    POTASSIUM 4.4 2017     Lab Results   Component Value Date    CHLORIDE 109 2017     Lab Results   Component Value Date    RILEY 9.7 2017     Lab Results   Component Value Date    CO2 25 2017     Lab Results   Component Value Date    BUN 31 2017     Lab Results   Component Value Date    CR 2.77 2017     Lab Results   Component Value Date     2017     Last Complete Blood Count:  Lab Results   Component Value Date    WBC 5.1 2017     Lab Results   Component Value Date    RBC 3.17 2017      Lab Results   Component Value Date    HGB 9.8 01/18/2017     Lab Results   Component Value Date    HCT 31.0 01/18/2017     Lab Results   Component Value Date    MCV 97.8 01/18/2017     Lab Results   Component Value Date    MCH 31.3 09/02/2015     Lab Results   Component Value Date    MCHC 32.6 09/02/2015     Lab Results   Component Value Date    RDW 12.9 01/18/2017     Lab Results   Component Value Date     01/18/2017     Assessment/Plan:  Bipolar disorder, current episode depressed, severe, without psychotic features (H)/Severe episode of recurrent major depressive disorder, without psychotic features (H)  Continue management with in-house psych  Update them on ongoing decreased appetite    Weight loss  Will start patient on House supplement TID  Dietary involvement to assess for most beneficial supplement for patient    Electronically signed by  ASCENCION Rm CNP

## 2017-05-11 ENCOUNTER — NURSING HOME VISIT (OUTPATIENT)
Dept: GERIATRICS | Facility: CLINIC | Age: 82
End: 2017-05-11
Payer: COMMERCIAL

## 2017-05-11 VITALS
SYSTOLIC BLOOD PRESSURE: 110 MMHG | RESPIRATION RATE: 20 BRPM | WEIGHT: 128.9 LBS | HEART RATE: 74 BPM | DIASTOLIC BLOOD PRESSURE: 67 MMHG | TEMPERATURE: 98.2 F | OXYGEN SATURATION: 96 % | BODY MASS INDEX: 23.58 KG/M2

## 2017-05-11 DIAGNOSIS — H61.23 BILATERAL IMPACTED CERUMEN: Primary | ICD-10-CM

## 2017-05-11 PROCEDURE — 99308 SBSQ NF CARE LOW MDM 20: CPT | Performed by: NURSE PRACTITIONER

## 2017-05-11 PROCEDURE — 99207 ZZC CDG-CORRECTLY CODED, REVIEWED AND AGREE: CPT | Performed by: NURSE PRACTITIONER

## 2017-05-11 NOTE — PROGRESS NOTES
[facility]    Chief Complaint   Patient presents with     RECHECK       HPI:    Maribel Sevilla is a 85 year old  (1931), who is being seen today for [facility].     Today's concern is:  Bilateral impacted cerumen  Patient with c/o cerumen impaction - patient with a hx of impaction 2/2 small ear canals  Does get Mineral oil gtts to bilateral ears every evening before bed to help prevent wax buildup    REVIEW OF SYSTEMS:  4 point ROS including Respiratory, CV, GI and , other than that noted in the HPI,  is negative    /67  Pulse 74  Temp 98.2  F (36.8  C)  Resp 20  Wt 128 lb 14.4 oz (58.5 kg)  SpO2 96%  BMI 23.58 kg/m2     BP Readin/69, 112/67, 130/76    GENERAL APPEARANCE:  Alert, in no distress  ENT: neck without nodes and sinuses nontender; cerumen present bilateral ears: unable to visualize R TM 2/2 100% cerumen impactions; unable to visualize L TM 2/2 80% cerumen impactions      ASSESSMENT/PLAN:  Bilateral impacted cerumen  Debrox solution 10gtts to bilateral ears qHS x4 days    ASCENCION Salmeron CNP

## 2017-06-07 VITALS
WEIGHT: 132 LBS | OXYGEN SATURATION: 97 % | RESPIRATION RATE: 18 BRPM | BODY MASS INDEX: 24.14 KG/M2 | DIASTOLIC BLOOD PRESSURE: 68 MMHG | HEART RATE: 76 BPM | SYSTOLIC BLOOD PRESSURE: 107 MMHG | TEMPERATURE: 97.6 F

## 2017-06-07 NOTE — PROGRESS NOTES
[facility]    Chief Complaint   Patient presents with     custodial Regulatory       HPI:    Maribel Sevilla is a 85 year old  (12/20/1931), who is being seen today for a federally mandated E/M visit at [facility].  HPI information obtained from: facility staff and [facility] chart review. Today's concerns are:  Non-intractable cyclical vomiting without nausea  Pt not having much trouble with this recently.    Lewy body dementia without behavioral disturbance  Weight is stable.  Wt Readings from Last 4 Encounters:   06/07/17 132 lb (59.9 kg)   05/11/17 128 lb 14.4 oz (58.5 kg)   04/20/17 132 lb (59.9 kg)   04/13/17 135 lb (61.2 kg)       Constipation, slow transit  BMs normally      ALLERGIES: No known allergies  PAST MEDICAL HISTORY:  has a past medical history of Anemia; Anxiety; Bipolar affective (H); Dementia; Depressive disorder; Gastro-oesophageal reflux disease; OA (osteoarthritis) of knee; and Renal insufficiencies, chronic.  PAST SURGICAL HISTORY:  has a past surgical history that includes Salpingo-oophorectomy bilateral; Hysterectomy; and Eye surgery.  FAMILY HISTORY: family history includes C.A.D. in her brother, father, mother, and sister; Psychotic Disorder in her son.  SOCIAL HISTORY:  reports that she has never smoked. She has never used smokeless tobacco. She reports that she does not drink alcohol or use illicit drugs.    MEDICATIONS:  Current Outpatient Prescriptions   Medication Sig Dispense Refill     sertraline (ZOLOFT) 50 MG tablet Take 100 mg by mouth daily        ranitidine (RANITIDINE) 150 MG tablet Take 150 mg by mouth daily       ondansetron (ZOFRAN) 4 MG tablet Take 4 mg by mouth 4 times daily        calcium carbonate (TUMS) 500 MG chewable tablet Take 2 chew tab by mouth 3 times daily as needed        lamoTRIgine (LAMICTAL) 200 MG tablet Take 300 mg by mouth daily        QUEtiapine (SEROQUEL) 50 MG tablet Take 150 mg by mouth At Bedtime         mirtazapine (REMERON) 15 MG tablet Take 30 mg by mouth At Bedtime        acetaminophen (TYLENOL) 500 MG tablet Take 500 mg by mouth 2 times daily and 1 tab every 4 hours as needed       senna-docusate (SENNA S) 8.6-50 MG per tablet Take 2 tablets by mouth At Bedtime       donepezil (ARICEPT) 5 MG tablet Take 10 mg by mouth daily  30 tablet 0     Cholecalciferol (VITAMIN D PO) Take 5,000 Units by mouth daily.       polyethylene glycol (MIRALAX/GLYCOLAX) packet Take 17 g by mouth daily. 28 each 1     Multiple Vitamins-Minerals (OCUVITE ADULT FORMULA) CAPS Take 1 tablet by mouth daily        Medications reviewed:  Medications reconciled to facility chart and changes were made to reflect current medications as identified as above med list. Below are the changes that were made:   Medications stopped since last EPIC medication reconciliation:   Medications Discontinued During This Encounter   Medication Reason     mineral oil liquid Discontinued by another Health Care Provider       Medications started since last [facility] medication reconciliation:  No orders of the defined types were placed in this encounter.    Case Management:  I have reviewed the care plan and MDS and do agree with the plan. Patient's desire to return to the community is not assessible due to cognitive impairment.  Information reviewed:  Medications, vital signs, orders, and nursing notes.    ROS:  Unobtainable secondary to cognitive impairment or aphasia.    Exam:  Vitals: /68  Pulse 76  Temp 97.6  F (36.4  C)  Resp 18  Wt 132 lb (59.9 kg)  SpO2 97%  BMI 24.14 kg/m2  BMI= Body mass index is 24.14 kg/(m^2).     BP Readin/64, 127/66, 121/71    GENERAL APPEARANCE:  Alert, in no distress, in bed.  ENT:  Mouth and posterior oropharynx normal, moist mucous membranes, hearing acuity normal  EYES:  Conjunctivae, lids, pupils and irises normal  NECK:  No adenopathy,masses or thyromegaly  RESP:  respiratory effort and palpation of chest  normal, no respiratory distress, Lung sounds clear  CV:  Palpation and auscultation of heart done, rate and rhythm reg, no murmur, no rub or gallop.  M/S:   Gait and station no deformities, Digits and nails normal  SKIN:  Inspection/Palpation of skin and subcutaneous tissue no wounds or rashes  NEURO: face symmetrical  PSYCH:  insight and judgement seem impaired in conversation, memory seems impaired , affect and mood normal      Lab/Diagnostic data:      CBC RESULTS:   Recent Labs   Lab Test 01/18/17 08/05/16  09/02/15   1933   07/19/12   1225   WBC  5.1  7.1  7.7   < >  5.6   RBC  3.17*  3.95  3.67*   < >  3.91   HGB  9.8*  12.1  11.5*   < >  12.2   HCT  31.0*  38.7  35.3   < >  36.6   MCV  97.8  98.0  96   < >  94   MCH   --    --   31.3   --   31.2   MCHC   --    --   32.6   --   33.3   RDW  12.9  12.8  12.7   < >  12.2   PLT  215  318  249   < >  173    < > = values in this interval not displayed.       Last Basic Metabolic Panel:  Recent Labs   Lab Test 02/06/17 01/23/17   NA  144  145   POTASSIUM  4.4  4.1   CHLORIDE  109  111*   RILEY  9.7  10.0   CO2  25  23   BUN  31*  22   CR  2.77*  2.25*   GLC  111*  129*       Liver Function Studies -   Recent Labs   Lab Test  09/08/11   0705  09/07/11   1910   PROTTOTAL  6.2*  7.1   ALBUMIN  3.5  4.0   BILITOTAL  0.5  0.6   ALKPHOS  86  101   AST  33  39   ALT  25  25       TSH   Date Value Ref Range Status   09/09/2011 2.14 0.4 - 5.0 mU/L Final         ASSESSMENT/PLAN  (G43.A0) Non-intractable cyclical vomiting without nausea  (primary encounter diagnosis)  Comment: unclear whether respoonded to scheduled Zofran or spontaneously.  Plan: monitor for recurrence.    (G31.83,  F02.80) Lewy body dementia without behavioral disturbance  Comment: Weight stable.    Plan: Avoid typical antipsychotics.    (K59.01) Constipation, slow transit  Comment: Can contribute to vomiting.  Having BMs ok.  Plan: continue to use laxatives as needed to prevent constipation.    Orders:  None  new      Electronically signed by:  Tamanna Hernandez MD, MS

## 2017-06-08 ENCOUNTER — NURSING HOME VISIT (OUTPATIENT)
Dept: GERIATRICS | Facility: CLINIC | Age: 82
End: 2017-06-08
Payer: COMMERCIAL

## 2017-06-08 DIAGNOSIS — G31.83 LEWY BODY DEMENTIA WITHOUT BEHAVIORAL DISTURBANCE (H): ICD-10-CM

## 2017-06-08 DIAGNOSIS — F02.80 LEWY BODY DEMENTIA WITHOUT BEHAVIORAL DISTURBANCE (H): ICD-10-CM

## 2017-06-08 DIAGNOSIS — R11.15 NON-INTRACTABLE CYCLICAL VOMITING WITHOUT NAUSEA: Primary | ICD-10-CM

## 2017-06-08 DIAGNOSIS — K59.01 CONSTIPATION, SLOW TRANSIT: ICD-10-CM

## 2017-06-08 PROCEDURE — 99207 ZZC CDG-CORRECTLY CODED, REVIEWED AND AGREE: CPT | Performed by: FAMILY MEDICINE

## 2017-06-08 PROCEDURE — 99308 SBSQ NF CARE LOW MDM 20: CPT | Performed by: FAMILY MEDICINE

## 2017-07-27 ENCOUNTER — TRANSFERRED RECORDS (OUTPATIENT)
Dept: HEALTH INFORMATION MANAGEMENT | Facility: CLINIC | Age: 82
End: 2017-07-27

## 2017-07-31 RX ORDER — LAMOTRIGINE 150 MG/1
200 TABLET ORAL AT BEDTIME
COMMUNITY
End: 2020-02-12

## 2017-08-01 ENCOUNTER — NURSING HOME VISIT (OUTPATIENT)
Dept: GERIATRICS | Facility: CLINIC | Age: 82
End: 2017-08-01
Payer: COMMERCIAL

## 2017-08-01 VITALS
TEMPERATURE: 97.7 F | BODY MASS INDEX: 23.59 KG/M2 | DIASTOLIC BLOOD PRESSURE: 68 MMHG | SYSTOLIC BLOOD PRESSURE: 130 MMHG | OXYGEN SATURATION: 96 % | HEART RATE: 69 BPM | WEIGHT: 129 LBS | RESPIRATION RATE: 18 BRPM

## 2017-08-01 DIAGNOSIS — M17.0 PRIMARY OSTEOARTHRITIS OF BOTH KNEES: ICD-10-CM

## 2017-08-01 DIAGNOSIS — F33.2 SEVERE EPISODE OF RECURRENT MAJOR DEPRESSIVE DISORDER, WITHOUT PSYCHOTIC FEATURES (H): ICD-10-CM

## 2017-08-01 DIAGNOSIS — K59.01 CONSTIPATION, SLOW TRANSIT: ICD-10-CM

## 2017-08-01 DIAGNOSIS — Z13.6 HYPERTENSION SCREEN: ICD-10-CM

## 2017-08-01 DIAGNOSIS — K21.9 GASTROESOPHAGEAL REFLUX DISEASE WITHOUT ESOPHAGITIS: ICD-10-CM

## 2017-08-01 DIAGNOSIS — K59.1 FUNCTIONAL DIARRHEA: ICD-10-CM

## 2017-08-01 DIAGNOSIS — F31.4 BIPOLAR DISORDER, CURRENT EPISODE DEPRESSED, SEVERE, WITHOUT PSYCHOTIC FEATURES (H): ICD-10-CM

## 2017-08-01 DIAGNOSIS — G31.83 LEWY BODY DEMENTIA WITHOUT BEHAVIORAL DISTURBANCE (H): Primary | ICD-10-CM

## 2017-08-01 DIAGNOSIS — R11.15 NON-INTRACTABLE CYCLICAL VOMITING WITHOUT NAUSEA: ICD-10-CM

## 2017-08-01 DIAGNOSIS — F51.04 PSYCHOPHYSIOLOGICAL INSOMNIA: ICD-10-CM

## 2017-08-01 DIAGNOSIS — Z13.6 CARDIOVASCULAR SCREENING; LDL GOAL LESS THAN 160: ICD-10-CM
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
## 2017-08-01 DIAGNOSIS — R63.4 WEIGHT LOSS: ICD-10-CM

## 2017-08-01 DIAGNOSIS — D53.9 DEFICIENCY ANEMIA: ICD-10-CM

## 2017-08-01 DIAGNOSIS — H61.23 BILATERAL IMPACTED CERUMEN: ICD-10-CM

## 2017-08-01 DIAGNOSIS — F41.1 ANXIETY STATE: ICD-10-CM

## 2017-08-01 DIAGNOSIS — Z71.89 ADVANCED DIRECTIVES, COUNSELING/DISCUSSION: ICD-10-CM

## 2017-08-01 DIAGNOSIS — F02.80 LEWY BODY DEMENTIA WITHOUT BEHAVIORAL DISTURBANCE (H): Primary | ICD-10-CM

## 2017-08-01 PROCEDURE — 99207 ZZC CDG-CORRECTLY CODED, REVIEWED AND AGREE: CPT | Performed by: NURSE PRACTITIONER

## 2017-08-01 PROCEDURE — 99318 ZZC ANNUAL NURSING FAC ASSESSMNT, STABLE: CPT | Performed by: NURSE PRACTITIONER

## 2017-08-01 NOTE — PROGRESS NOTES
[facility]  Chief Complaint   Patient presents with     Annual Comprehensive Nursing Home       HPI:    Maribel Sevilla is a 85 year old  (12/20/1931), who is being seen today for an annual comprehensive visit at [facility].  HPI information obtained from: facility chart records, facility staff, patient report and [facility] chart review.  Today's concerns are:  Lewy body dementia without behavioral disturbance  Chronic; progressive; continues to require 24-hr supportive cares  Patient with no noted behaviors other than depressive ex as noted below  Last BIMs: 15/15  On regimen of Aricept    Bilateral impacted cerumen  Chronic  Started on mineral oil gtts with improvement in frequency/severity of impaction  Patient denies ear fullness, pain or change in level of hearing    Gastroesophageal reflux disease without esophagitis  Chronic  Patient denies s/sx f GERD  On regimen of Ranitidine and TUMs    Constipation, slow transit  Denies difficulty with bowel movements  On regimen of Senna-S and Miralax    Functional diarrhea  Resolved    Non-intractable cyclical vomiting without nausea  Waxes and wanes - appears to be with patient's variances in her BPD  PRN Zofran available    Primary osteoarthritis of both knees  Chronic - patient denies increase in baseline pain  On regimen of Tylenol    Deficiency anemia  Hemoglobin   Date Value Ref Range Status   01/18/2017 9.8 (A) 11.8 - 15.5 g/dL Final   08/05/2016 12.1 11.7 - 15.7 g/dL Final   On daily MV    Severe episode of recurrent major depressive disorder, without psychotic features (H)/Bipolar disorder, current episode depressed, severe, without psychotic features (H)/Anxiety state  On regimen of Lamictal, Remeron, Zoloft and Seroquel  Patient has up and down days - some days she will lay in bed all day and eat minimal, others she is out of her room for all meals - doesn't participate in facility activities per her preference; is  followed by in-house psychology and psychiatry  Depression screen done: PHQ-9 Given screen score and clinical assessment patient is needing further interventions of medication and ongoing psych involvement and will plan to reassess in 1 month.  Last PHQ9: 10/27    Advanced directives, counseling/discussion  Discussion as noted below    Psychophysiological insomnia  Patient denies difficulties sleeping  On regimen of Remeron    Weight loss  Wt Readings from Last 4 Encounters:   17 130 lb (59 kg)   17 129 lb (58.5 kg)   17 132 lb (59.9 kg)   17 128 lb 14.4 oz (58.5 kg)   Weight has been stable  Monitored by dietary    Fall, subsequent encounter  Patient with frequent falls throughout past year - most without injury; typically 2/2 patient not waiting for help to the bathroom when she is in a hurry to go.    Screening for hypertension  Based on JNC-8 goals,  patients age of 85 year old, presence of diabetes or CKD, and goals of care goal BP is <150/90 mm Hg. patient is stable and continue without pharmacological invention with routine assessment.  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  PAST SURGICAL HISTORY:  has a past surgical history that includes Salpingo-oophorectomy bilateral; Hysterectomy; and Eye surgery.  FAMILY HISTORY: family history includes C.A.D. in her brother, father, mother, and sister; Psychotic Disorder in her son.  SOCIAL HISTORY:  reports that she has never smoked. She has never used smokeless tobacco. She reports that she does not drink alcohol or use illicit drugs.  IMMUNIZATIONS:  Most Recent Immunizations   Administered Date(s) Administered     Influenza (IIV3) 10/18/2016     Mantoux 08/20/2011     Pneumococcal (PCV 13) 10/05/2015     Pneumococcal 23 valent 10/23/2013     TD (ADULT, 7+) 10/30/2011     Tdap (Adacel,Boostrix) 10/29/2011     Above immunizations pulled from [facility]. [facility] and facility records also reconciled. Outstanding information sent to  to update [facility].  Future immunizations needed:  yearly influenza per facility protocol  MEDICATIONS:  Current Outpatient Prescriptions   Medication Sig Dispense Refill     lamoTRIgine (LAMICTAL) 150 MG tablet Take 300 mg by mouth At Bedtime       sertraline (ZOLOFT) 50 MG tablet Take 150 mg by mouth daily        ranitidine (RANITIDINE) 150 MG tablet Take 150 mg by mouth daily       ondansetron (ZOFRAN) 4 MG tablet Take 4 mg by mouth 4 times daily        calcium carbonate (TUMS) 500 MG chewable tablet Take 2 chew tab by mouth 3 times daily as needed        QUEtiapine (SEROQUEL) 50 MG tablet Take 150 mg by mouth At Bedtime        mirtazapine (REMERON) 15 MG tablet Take 30 mg by mouth At Bedtime         acetaminophen (TYLENOL) 500 MG tablet Take 500 mg by mouth 2 times daily and 1 tab every 4 hours as needed       senna-docusate (SENNA S) 8.6-50 MG per tablet Take 2 tablets by mouth At Bedtime       donepezil (ARICEPT) 5 MG tablet Take 10 mg by mouth daily  30 tablet 0     Cholecalciferol (VITAMIN D PO) Take 5,000 Units by mouth daily.       polyethylene glycol (MIRALAX/GLYCOLAX) packet Take 17 g by mouth daily. 28 each 1     Multiple Vitamins-Minerals (OCUVITE ADULT FORMULA) CAPS Take 1 tablet by mouth daily        Medications reviewed:  Medications reconciled to facility chart and changes were made to reflect current medications as identified as above med list. Below are the changes that were made:   Medications stopped since last EPIC medication reconciliation:   Medications Discontinued During This Encounter   Medication Reason     lamoTRIgine (LAMICTAL) 200 MG tablet Dose adjustment       Medications started since last [facility] medication reconciliation:  Orders Placed This Encounter   Medications     lamoTRIgine (LAMICTAL) 150 MG tablet     Sig: Take 300 mg by mouth At Bedtime     Case Management:  I have reviewed the facility/SNF care plan/MDS which was done 05/17/2017, including the falls risk, nutrition and pain screening. I also reviewed the current immunizations, and preventive care..Future cancer screening is not clinically indicated secondary to age/goals of care Patient's desire to return to the community is present, but is not able due to care needs . Current Level of Care is appropriate.    Advance Directive Discussion:    I reviewed the current advanced directives as reflected in EPIC, the POLST and the facility chart, and verified the congruency of orders. I contacted the first party, daughter - Mari and left a message regarding the plan of Care.  I did review the advance directives with the resident.     Team Discussion:  I communicated with the appropriate disciplines involved with the Plan of  Care:   Nursing:  Staff nurse    Patient Goal:  Patient's goal is to continue to have good days.    Information reviewed:  Medications, vital signs, orders, and nursing notes.    ROS:  10 point ROS of systems including Constitutional, Eyes, Respiratory, Cardiovascular, Gastroenterology, Genitourinary, Integumentary, Muscularskeletal, Psychiatric were all negative except for pertinent positives noted in my HPI.    Exam:  /68  Pulse 69  Temp 97.7  F (36.5  C)  Resp 18  Wt 129 lb (58.5 kg)  SpO2 96%  BMI 23.59 kg/m2  GENERAL APPEARANCE:  Alert, in no distress, oriented, cooperative elderly woman resting in bed  ENT:  Mouth and posterior oropharynx normal, moist mucous membranes, [place]  EYES:  EOM, conjunctivae, lids, pupils and irises normal  NECK:  No adenopathy, masses or thyromegaly  RESP:  Respiratory effort and palpation of chest normal, lungs clear to auscultation, no respiratory distress  CV:  Palpation and auscultation of heart done, regular rate and rhythm, no murmur, rub, or gallop, no edema, +2 pedal pulses  ABDOMEN: Active bowel sounds x4, soft, nontender, no hepatosplenomegaly or other masses  M/S:   Gait and station normal; Digits and nails abnormal - arthritic changes present  NEURO:   Cranial nerves 2-12 are normal tested and grossly at patient's baseline  PSYCH:  oriented X 3, normal insight, judgement and memory, affect and mood Pleasant    Lab/Diagnostic data:      CBC RESULTS:   Recent Labs   Lab Test 01/18/17 08/05/16  09/02/15   1933   07/19/12   1225   WBC  5.1  7.1  7.7   < >  5.6   RBC  3.17*  3.95  3.67*   < >  3.91   HGB  9.8*  12.1  11.5*   < >  12.2   HCT  31.0*  38.7  35.3   < >  36.6   MCV  97.8  98.0  96   < >  94   MCH   --    --   31.3   --   31.2   MCHC   --    --   32.6   --   33.3   RDW  12.9  12.8  12.7   < >  12.2   PLT  215  318  249   < >  173    < > = values in this interval not displayed.       Last Basic Metabolic Panel:  Recent Labs   Lab Test 02/06/17 01/23/17    NA  144  145   POTASSIUM  4.4  4.1   CHLORIDE  109  111*   RILEY  9.7  10.0   CO2  25  23   BUN  31*  22   CR  2.77*  2.25*   GLC  111*  129*       Liver Function Studies -   Recent Labs   Lab Test  09/08/11   0705  09/07/11   1910   PROTTOTAL  6.2*  7.1   ALBUMIN  3.5  4.0   BILITOTAL  0.5  0.6   ALKPHOS  86  101   AST  33  39   ALT  25  25       TSH   Date Value Ref Range Status   09/09/2011 2.14 0.4 - 5.0 mU/L Final         ASSESSMENT/PLAN   Diagnosis Comments   1. Lewy body dementia without behavioral disturbance  Stable on current regimen; continue medications as currently ordered.   2. Bilateral impacted cerumen  Stable on current regimen; continue medications as currently ordered.   3. Gastroesophageal reflux disease without esophagitis  Stable on current regimen; continue medications as currently ordered.   4. Constipation, slow transit  Stable on current regimen; continue medications as currently ordered.   5. Functional diarrhea  Resolved   6. Non-intractable cyclical vomiting without nausea  Stable on current regimen; continue medications as currently ordered.  BMP   7. Primary osteoarthritis of both knees  Stable on current regimen; continue medications as currently ordered.   8. Deficiency anemia  CBC   9. Severe episode of recurrent major depressive disorder, without psychotic features (H)  Variable on current regimen; continue  POC as currently ordered.  Continue close monitoring per psych  LFTs  Vit D level  Lamictal level   10. Bipolar disorder, current episode depressed, severe, without psychotic features (H)  As above   11. Anxiety state  As above   12. Advanced directives, counseling/discussion  Full Code   13. Psychophysiological insomnia  Stable on current regimen; continue medications as currently ordered.   14. Weight loss  Stable on current regimen; continue POC as currently ordered.   15. Fall, subsequent encounter  Stable without medical intervention   16. Hypertension screen  Stable  without medical intervention     Electronically signed by:  ASCENCION Rm CNP

## 2017-08-02 ENCOUNTER — TRANSFERRED RECORDS (OUTPATIENT)
Dept: HEALTH INFORMATION MANAGEMENT | Facility: CLINIC | Age: 82
End: 2017-08-02

## 2017-08-02 LAB
ALBUMIN SERPL-MCNC: 3 G/DL (ref 3.4–5)
ALP SERPL-CCNC: 81 U/L (ref 40–150)
ALT SERPL-CCNC: 13 U/L (ref 9–55)
AST SERPL-CCNC: 27 U/L (ref 10–40)
BILIRUB SERPL-MCNC: 0.2 MG/DL (ref 0.2–1.2)
BUN SERPL-MCNC: 30 MG/DL (ref 9–26)
CALCIUM SERPL-MCNC: 9.5 MG/DL (ref 8.4–10.2)
CHLORIDE SERPLBLD-SCNC: 110 MMOL/L (ref 98–109)
CO2 SERPL-SCNC: 25 MMOL/L (ref 22–31)
CREAT SERPL-MCNC: 2.8 MG/DL (ref 0.55–1.02)
DIFFERENTIAL: ABNORMAL
ERYTHROCYTE [DISTWIDTH] IN BLOOD BY AUTOMATED COUNT: 13.9 % (ref 11–15)
GFR SERPL CREATININE-BSD FRML MDRD: 15 ML/MIN/1.73M2
GLUCOSE SERPL-MCNC: 138 MG/DL (ref 70–100)
HCT VFR BLD AUTO: 26.8 % (ref 35–46)
HEMOGLOBIN: 8.2 G/DL (ref 11.8–15.5)
MCV RBC AUTO: 93.4 FL (ref 80–100)
PLATELET # BLD AUTO: 226 K/CMM (ref 140–450)
POTASSIUM SERPL-SCNC: 4 MMOL/L (ref 3.5–5.2)
PROT SERPL-MCNC: 6.2 G/DL (ref 6.4–8.3)
RBC # BLD AUTO: 2.87 M/CMM (ref 3.7–5.2)
SODIUM SERPL-SCNC: 142 MMOL/L (ref 136–145)
WBC # BLD AUTO: 4.9 K/CMM (ref 3.8–11)

## 2017-08-03 ENCOUNTER — NURSING HOME VISIT (OUTPATIENT)
Dept: GERIATRICS | Facility: CLINIC | Age: 82
End: 2017-08-03
Payer: COMMERCIAL

## 2017-08-03 VITALS
WEIGHT: 130 LBS | RESPIRATION RATE: 20 BRPM | BODY MASS INDEX: 23.78 KG/M2 | OXYGEN SATURATION: 95 % | SYSTOLIC BLOOD PRESSURE: 102 MMHG | DIASTOLIC BLOOD PRESSURE: 64 MMHG | HEART RATE: 68 BPM | TEMPERATURE: 98.6 F

## 2017-08-03 DIAGNOSIS — F33.2 SEVERE EPISODE OF RECURRENT MAJOR DEPRESSIVE DISORDER, WITHOUT PSYCHOTIC FEATURES (H): ICD-10-CM

## 2017-08-03 DIAGNOSIS — D53.9 DEFICIENCY ANEMIA: Primary | ICD-10-CM

## 2017-08-03 DIAGNOSIS — N18.4 CKD (CHRONIC KIDNEY DISEASE) STAGE 4, GFR 15-29 ML/MIN (H): ICD-10-CM

## 2017-08-03 DIAGNOSIS — Z86.39 H/O VITAMIN D DEFICIENCY: ICD-10-CM

## 2017-08-03 DIAGNOSIS — F31.4 BIPOLAR DISORDER, CURRENT EPISODE DEPRESSED, SEVERE, WITHOUT PSYCHOTIC FEATURES (H): ICD-10-CM

## 2017-08-03 PROCEDURE — 99309 SBSQ NF CARE MODERATE MDM 30: CPT | Performed by: NURSE PRACTITIONER

## 2017-08-03 NOTE — PROGRESS NOTES
[facility]    Chief Complaint   Patient presents with     Labs Only       HPI:    Maribel Sevilla is a 85 year old  (1931), who is being seen today for an episodic care visit at [facility].  HPI information obtained from: facility chart records and [facility] chart review. Today's concern is:     Deficiency anemia  Severe episode of recurrent major depressive disorder, without psychotic features (H)  Bipolar disorder, current episode depressed, severe, without psychotic features (H)  H/O vitamin D deficiency  CKD (chronic kidney disease) stage 4, GFR 15-29 ml/min (H)   Annual labs resulted:          GFR Estimate   Date Value Ref Range Status   2017 15 (A) >60 ml/min/1.73m2 Final   2017 19 (L) >60 ml/min/1.73m2 Final   2017 19 (L) >60 ml/min/1.73m2 Final       REVIEW OF SYSTEMS:  4 point ROS including Respiratory, CV, GI and , other than that noted in the HPI,  is negative    /64  Pulse 68  Temp 98.6  F (37  C)  Resp 20  Wt 130 lb (59 kg)  SpO2 95%  BMI 23.78 kg/m2     BP Readin/68, 103/61, 83/56           HR:  68-74    GENERAL APPEARANCE:  Alert, in no distress    ASSESSMENT/PLAN:   Diagnosis Comments   1. Deficiency anemia  Stable on current regimen; continue medications as currently ordered.   2. Severe episode of recurrent major depressive disorder, without psychotic features (H)  Stable on current regimen; continue medications as currently ordered.   3. Bipolar disorder, current episode depressed, severe, without psychotic features (H)  Stable on current regimen; continue medications as currently ordered.   4. H/O vitamin D deficiency  Decrease Vit D to 2000u qDay   5. CKD (chronic kidney disease) stage 4, GFR 15-29 ml/min (H)  CKD stable  BMP q3mon     ASCENCION Salmeron CNP

## 2017-08-04 ENCOUNTER — TRANSFERRED RECORDS (OUTPATIENT)
Dept: HEALTH INFORMATION MANAGEMENT | Facility: CLINIC | Age: 82
End: 2017-08-04

## 2017-08-15 PROBLEM — N18.4 CKD (CHRONIC KIDNEY DISEASE) STAGE 4, GFR 15-29 ML/MIN (H): Status: ACTIVE | Noted: 2017-08-15

## 2017-08-29 ENCOUNTER — CLINICAL UPDATE (OUTPATIENT)
Dept: PHARMACY | Facility: CLINIC | Age: 82
End: 2017-08-29

## 2017-08-29 NOTE — PROGRESS NOTES
This patient's medication list and chart were reviewed as part of the service provided by [facility].    Assessment/Recommendations:  1. (Lewy Body Dementia):  May be of benefit to consider taper and discontinuation of Donepezil.  Pt on scheduled Zofran for nausea & vomiting, and Donepezil may certainly contribute to these g.i. Symptoms, and hate to treat side effect of med with another drug.  Another consideration in future would be potential reduction of Sertraline dose and monitor pt's symptoms - significant g.i. Side effects associated with Sertraline as well.  Please note following interactions:  Increased risk of QT prolongation with combo of Donepezil and Zofran as well as Donepezil and Quetiapine, as well as:  Concurrent use of LAMOTRIGINE and SERTRALINE may result in an increased risk of lamotrigine toxicity (fatigue, sedation, confusion, decreased cognition).     Pt's calculated CrCl ([place]) approx 12ml/min.  I did ensure current doses appropriate given renal function.  Please note vit D dose recently reduced; not updated in Epic med list.  Please reconcile & follow-up vitamin D level in future to ensure reduction in dose enough to bring elevated level down.    Jade Wade, Pharm.D.,[facility]  Board Certified Geriatric Pharmacist  Medication Therapy Management Pharmacist  [phone]

## 2017-09-06 ENCOUNTER — PRE VISIT (OUTPATIENT)
Dept: OTOLARYNGOLOGY | Facility: CLINIC | Age: 82
End: 2017-09-06

## 2017-09-06 NOTE — TELEPHONE ENCOUNTER
1.  Date/reason for appt: 9/26/17 -- ear wax removal     2.  Referring provider: self    3.  Call to patient (Yes / No - short description): no, per appt notes -- pt has no outside records

## 2017-09-26 ENCOUNTER — OFFICE VISIT (OUTPATIENT)
Dept: OTOLARYNGOLOGY | Facility: CLINIC | Age: 82
End: 2017-09-26

## 2017-09-26 VITALS — HEIGHT: 62 IN | BODY MASS INDEX: 23 KG/M2 | WEIGHT: 125 LBS

## 2017-09-26 DIAGNOSIS — H61.23 BILATERAL IMPACTED CERUMEN: Primary | ICD-10-CM

## 2017-09-26 NOTE — PROGRESS NOTES
Dear Dr. Salmeron Ref-Primary, Physician:    I had the pleasure of meeting Maribel Sevilla in consultation today at the [facility] at your request.    HISTORY OF PRESENT ILLNESS: Patient is an 85-year-old in today for stroma and impactions. She is in with her daughter. She has dementia and daughter has noticed recent increased hearing loss. She did see an audiologist and cerumen was found. They have been unable to clean it with irrigations. She has not had any pain or drainage. Hearing is muffled in both ears.    ALLERGIES:    Allergies   Allergen Reactions     No Known Allergies        HABITS:   Alcohol use No    History   Smoking Status     Never Smoker   Smokeless Tobacco     Never Used         PAST MEDICAL HISTORY: Please see today's intake form (for the remainder of the PMH) which I reviewed and signed.  Past Medical History:   Diagnosis Date     Anemia      Anxiety      Bipolar affective (H)      Dementia     LewyBody Dementia Sept 2011     Depressive disorder      Gastro-oesophageal reflux disease      OA (osteoarthritis) of knee      Renal insufficiencies, chronic        FAMILY HISTORY/SOCIAL HISTORY:   Family History   Problem Relation Age of Onset     C.A.D. Mother      ?     C.A.D. Father      ?     C.A.D. Sister      ?     C.A.D. Brother      ?     Psychotic Disorder Son      suicide    Social History     Social History     Marital status: Single     Spouse name: N/A     Number of children: 6     Years of education: N/A     Occupational History     Not on file.     Social History Main Topics     Smoking status: Never Smoker     Smokeless tobacco: Never Used     Alcohol use No     Drug use: No     Sexual activity: Not Currently     Partners: Male     Other Topics Concern     Parent/Sibling W/ Cabg, Mi Or Angioplasty Before 65f 55m? No     Social History Narrative    Single, 6 children.  Hx of bipolatr dfisorder, and is now living in nursing home.  Daughter, Mari, is her  HC-POA.       REVIEW OF SYSTEMS: Please see today's intake form (for the remainder of the ROS) which I have reviewed and signed.    PHYSICIAL EXAMINATION:  Constitutional: The patient was well-groomed and in no acute distress.   Skin: Warm and pink.  Psychiatric: The patient's affect was calm, cooperative, and appropriate.   Respiratory: Breathing comfortably without stridor or exertion of accessory muscles.  Eyes: Pupils were equal and reactive. Extraocular movement intact.   Head: Normocephalic and atraumatic. No lesions or scars.  Ears: Both ears examined and she does have bilateral complete impactions. She was placed on the microscope and the right side initially was cleaned with curettes, suction, and alligator. Following cleaning, TM and middle ear looked normal. The opposite ear was cleaned and examined using microscope, curet, and similar techniques TM and middle ear looked normal after cleaning.  Nose: Sinuses were nontender. Anterior rhinoscopy revealed midline septum and absence of purulence or polyps.  Oral Cavity: Normal tongue, floor of moth, buccal mucosa, and palate. No lesions or masses on inspection or palpation. No abnormal lymph tissue in the oropharynx.   Neck: The parotid is soft without masses. Supple with normal laryngeal and tracheal landmarks.   Lymphatic: There is no palpable lymphadenopathy or other masses in the neck.   Neurologic: Alert and oriented x 3. Cranial nerves III-XI within normal limits. Voice quality normal.  Cerebellar Function Tests:  Grossly normal    Audiogram:  Tuning fork showed normal responses after cleaning.    IMPRESSION AND PLAN: Subjectively hearing feels better as well as the ears. They like to come in in one year to check to see if cerumen is a problem. She is in a assisted living facility. She'll come back sooner with any concerns or problems.    Thank you very much for the opportunity to participate in the care of your patient.    Rick L Nissen MD

## 2017-09-26 NOTE — LETTER
9/26/2017       RE: Maribel Sevilla  [facility]  [address] [place] [id number]     Dear Colleague,    Thank you for referring your patient, Maribel Sevilla, to the [facility] at [facility]. Please see a copy of my visit note below.    Dear Dr. Salmeron Ref-Primary, Physician:    I had the pleasure of meeting Maribel Sevilla in consultation today at the [facility] at your request.    HISTORY OF PRESENT ILLNESS: Patient is an 85-year-old in today for stroma and impactions. She is in with her daughter. She has dementia and daughter has noticed recent increased hearing loss. She did see an audiologist and cerumen was found. They have been unable to clean it with irrigations. She has not had any pain or drainage. Hearing is muffled in both ears.    ALLERGIES:    Allergies   Allergen Reactions     No Known Allergies        HABITS:   Alcohol use No    History   Smoking Status     Never Smoker   Smokeless Tobacco     Never Used         PAST MEDICAL HISTORY: Please see today's intake form (for the remainder of the PMH) which I reviewed and signed.  Past Medical History:   Diagnosis Date     Anemia      Anxiety      Bipolar affective (H)      Dementia     LewyBody Dementia Sept 2011     Depressive disorder      Gastro-oesophageal reflux disease      OA (osteoarthritis) of knee      Renal insufficiencies, chronic        FAMILY HISTORY/SOCIAL HISTORY:   Family History   Problem Relation Age of Onset     C.A.D. Mother      ?     C.A.D. Father      ?     C.A.D. Sister      ?     C.A.D. Brother      ?     Psychotic Disorder Son      suicide    Social History     Social History     Marital status: Single     Spouse name: N/A     Number of children: 6     Years of education: N/A     Occupational History     Not on file.     Social History Main Topics     Smoking status: Never Smoker     Smokeless tobacco: Never Used     Alcohol  use No     Drug use: No     Sexual activity: Not Currently     Partners: Male     Other Topics Concern     Parent/Sibling W/ Cabg, Mi Or Angioplasty Before 65f 55m? No     Social History Narrative    Single, 6 children.  Hx of bipolatr dfisorder, and is now living in nursing home.  Daughter, Mari, is her HC-POA.       REVIEW OF SYSTEMS: Please see today's intake form (for the remainder of the ROS) which I have reviewed and signed.    PHYSICIAL EXAMINATION:  Constitutional: The patient was well-groomed and in no acute distress.   Skin: Warm and pink.  Psychiatric: The patient's affect was calm, cooperative, and appropriate.   Respiratory: Breathing comfortably without stridor or exertion of accessory muscles.  Eyes: Pupils were equal and reactive. Extraocular movement intact.   Head: Normocephalic and atraumatic. No lesions or scars.  Ears: Both ears examined and she does have bilateral complete impactions. She was placed on the microscope and the right side initially was cleaned with curettes, suction, and alligator. Following cleaning, TM and middle ear looked normal. The opposite ear was cleaned and examined using microscope, curet, and similar techniques TM and middle ear looked normal after cleaning.  Nose: Sinuses were nontender. Anterior rhinoscopy revealed midline septum and absence of purulence or polyps.  Oral Cavity: Normal tongue, floor of moth, buccal mucosa, and palate. No lesions or masses on inspection or palpation. No abnormal lymph tissue in the oropharynx.   Neck: The parotid is soft without masses. Supple with normal laryngeal and tracheal landmarks.   Lymphatic: There is no palpable lymphadenopathy or other masses in the neck.   Neurologic: Alert and oriented x 3. Cranial nerves III-XI within normal limits. Voice quality normal.  Cerebellar Function Tests:  Grossly normal    Audiogram:  Tuning fork showed normal responses after cleaning.    IMPRESSION AND PLAN: Subjectively hearing feels better  as well as the ears. They like to come in in one year to check to see if cerumen is a problem. She is in a assisted living facility. She'll come back sooner with any concerns or problems.    Thank you very much for the opportunity to participate in the care of your patient.    Rick L Nissen MD          Again, thank you for allowing me to participate in the care of your patient.      Sincerely,    Rick L. Nissen, MD

## 2017-09-26 NOTE — NURSING NOTE
Chief Complaint   Patient presents with     Consult     ear wax removal     Mireille Tanner Medical Assistant

## 2017-09-26 NOTE — MR AVS SNAPSHOT
"              After Visit Summary   2017    Maribel Sevilla    MRN: [id number]           Patient Information     Date Of Birth          1931        Visit Information        Provider Department      2017 8:00 AM Nissen, Rick L, MD [facility]         Follow-ups after your visit        Follow-up notes from your care team     Return in about 1 year (around 2018).      Who to contact     Please call your clinic at [phone] to:    Ask questions about your health    Make or cancel appointments    Discuss your medicines    Learn about your test results    Speak to your doctor   If you have compliments or concerns about an experience at your clinic, or if you wish to file a complaint, please contact [facility] Patient Relations at [phone] or email us at [email or web address]         Additional Information About Your Visit        MyChart Information     [employer] is an electronic gateway that provides easy, online access to your medical records. With [employer], you can request a clinic appointment, read your test results, renew a prescription or communicate with your care team.     To sign up for [employer] visit the website at [email or web address]   You will be asked to enter the access code listed below, as well as some personal information. Please follow the directions to create your username and password.     Your access code is: [email or web address]  Expires: 2017  6:30 AM     Your access code will  in 90 days. If you need help or a new code, please contact your [facility] or call [phone] for assistance.        Care EveryWhere ID     This is your Care EveryWhere ID. This could be used by other organizations to access your [place] medical records  [id number]        Your Vitals Were     Height BMI (Body Mass Index)                1.575 m (5' 2\") 22.86 kg/m2           Blood Pressure from Last 3 " Encounters:   08/03/17 102/64   08/01/17 130/68   06/07/17 107/68    Weight from Last 3 Encounters:   09/26/17 56.7 kg (125 lb)   08/03/17 59 kg (130 lb)   08/01/17 58.5 kg (129 lb)              Today, you had the following     No orders found for display       Primary Care Provider    Physician No Ref-Primary       No address on file        Equal Access to Services     NOEFERMIN ANNABEL : Hadii aad ku hadtavoo Sokobeali, waaxda luqadaha, qaybta kaalmada adeegyada, waxay fadumoin ivetten kieranwilliam woodall larobermarine . So [facility] [phone].    ATENCIÓN: Si jose antonio hicks, tiene a gutiérrez disposición servicios gratuitos de asistencia lingüística. Llame al [phone].    We comply with applicable federal civil rights laws and [place] laws. We do not discriminate on the basis of race, color, national origin, age, disability sex, sexual orientation or gender identity.            Thank you!     Thank you for choosing [facility]  for your care. Our goal is always to provide you with excellent care. Hearing back from our patients is one way we can continue to improve our services. Please take a few minutes to complete the written survey that you may receive in the mail after your visit with us. Thank you!             Your Updated Medication List - Protect others around you: Learn how to safely use, store and throw away your medicines at www.disposemymeds.org.          This list is accurate as of: 9/26/17  8:46 AM.  Always use your most recent med list.                   Brand Name Dispense Instructions for use Diagnosis    acetaminophen 500 MG tablet    TYLENOL     Take 500 mg by mouth 2 times daily and 1 tab every 4 hours as needed        calcium carbonate 500 MG chewable tablet    TUMS     Take 2 chew tab by mouth 3 times daily as needed        donepezil 5 MG tablet    ARIcept    30 tablet    Take 10 mg by mouth daily        LAMICTAL 150 MG tablet   Generic drug:  lamoTRIgine      Take 300 mg by mouth At Bedtime         OCUVITE ADULT FORMULA Caps      Take 1 tablet by mouth daily        polyethylene glycol Packet    MIRALAX/GLYCOLAX    28 each    Take 17 g by mouth daily.    Constipation       QUEtiapine 50 MG tablet    SEROquel     Take 150 mg by mouth At Bedtime        ranitidine 150 MG tablet   Generic drug:  ranitidine      Take 150 mg by mouth daily        REMERON 15 MG tablet   Generic drug:  mirtazapine      Take 30 mg by mouth At Bedtime        SENNA S 8.6-50 MG per tablet   Generic drug:  senna-docusate      Take 2 tablets by mouth At Bedtime        VITAMIN D PO      Take 5,000 Units by mouth daily.        ZOFRAN 4 MG tablet   Generic drug:  ondansetron      Take 4 mg by mouth 4 times daily        ZOLOFT 50 MG tablet   Generic drug:  sertraline      Take 150 mg by mouth daily

## 2017-10-01 VITALS
RESPIRATION RATE: 20 BRPM | DIASTOLIC BLOOD PRESSURE: 62 MMHG | TEMPERATURE: 96.5 F | BODY MASS INDEX: 23.78 KG/M2 | SYSTOLIC BLOOD PRESSURE: 100 MMHG | HEART RATE: 68 BPM | OXYGEN SATURATION: 97 % | WEIGHT: 130 LBS

## 2017-10-02 ENCOUNTER — NURSING HOME VISIT (OUTPATIENT)
Dept: GERIATRICS | Facility: CLINIC | Age: 82
End: 2017-10-02
Payer: COMMERCIAL

## 2017-10-02 DIAGNOSIS — H61.23 BILATERAL IMPACTED CERUMEN: ICD-10-CM

## 2017-10-02 DIAGNOSIS — K59.01 SLOW TRANSIT CONSTIPATION: ICD-10-CM

## 2017-10-02 DIAGNOSIS — F31.9 BIPOLAR AFFECTIVE DISORDER, REMISSION STATUS UNSPECIFIED (H): Primary | ICD-10-CM

## 2017-10-02 PROCEDURE — 99309 SBSQ NF CARE MODERATE MDM 30: CPT | Performed by: INTERNAL MEDICINE

## 2017-10-02 NOTE — PROGRESS NOTES
[facility]  Nursing Home Regulatory Visit  October 2, 2017    Chief Complaint   Patient presents with     retirement Regulatory       HPI:    Maribel Sevilla is a 85 year old  (12/20/1931), who is being seen today for a federally mandated E/M visit at [facility]. Today's concerns are:    1) Bipolar Disorder -- Continues to have cyclical pattern to her moods, sometimes spending a couple days in bed. Overall trend has been stable. Follows with in-house psychiatry and managed with lamotrigine 300 mg qhs, mirtazapine 30 mg qhs, quetiapine 150 mg qhs and sertraline 150 mg daily  2) Slow Transit Constipation -- managed with Miralax 17g daily and Senna-S 2 tabs qhs  3) Bilateral Impacted Cerumen -- failed management at facility and was seen by ENT on 9/26 for removal. Hearing now improved.     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 Mother      ?     C.A.D. Father      ?     C.A.D. Sister      ?     C.A.D. Brother      ?     Psychotic Disorder Son      suicide       SOCIAL HISTORY:   Lives in a SNF    MEDICATIONS:  Current Outpatient Prescriptions   Medication Sig Dispense Refill     Stannous Fluoride (GEL-AMBAR) 0.4 % GEL Apply to affected area At Bedtime use a small amount of Gel-ambar after regular brushing , brush throughly, keep on teeth for a minute then spit , do not rinse.  Use at night before bed for cavity prevention .       lamoTRIgine  (LAMICTAL) 150 MG tablet Take 300 mg by mouth At Bedtime       sertraline (ZOLOFT) 50 MG tablet Take 150 mg by mouth daily        ranitidine (RANITIDINE) 150 MG tablet Take 150 mg by mouth daily       ondansetron (ZOFRAN) 4 MG tablet Take 4 mg by mouth 4 times daily        QUEtiapine (SEROQUEL) 50 MG tablet Take 150 mg by mouth At Bedtime        mirtazapine (REMERON) 15 MG tablet Take 30 mg by mouth At Bedtime        acetaminophen (TYLENOL) 500 MG tablet Take 500 mg by mouth 2 times daily and 1 tab every 4 hours as needed       senna-docusate (SENNA S) 8.6-50 MG per tablet Take 2 tablets by mouth At Bedtime       donepezil (ARICEPT) 5 MG tablet Take 10 mg by mouth daily  30 tablet 0     polyethylene glycol (MIRALAX/GLYCOLAX) packet Take 17 g by mouth daily. 28 each 1     Multiple Vitamins-Minerals (OCUVITE ADULT FORMULA) CAPS Take 1 tablet by mouth daily          Medications reviewed:  Medications reconciled to facility chart and changes were made to reflect current medications as identified as above med list. Below are the changes that were made:   Medications stopped since last EPIC medication reconciliation:   Medications Discontinued During This Encounter   Medication Reason     calcium carbonate (TUMS) 500 MG chewable tablet Discontinued by another Health Care Provider     Cholecalciferol (VITAMIN D PO) Discontinued by another Health Care Provider     Medications started since last [facility] medication reconciliation:  Orders Placed This Encounter   Medications     Stannous Fluoride (GEL-AMBAR) 0.4 % GEL     Sig: Apply to affected area At Bedtime use a small amount of Gel-ambar after regular brushing , brush throughly, keep on teeth for a minute then spit , do not rinse.  Use at night before bed for cavity prevention .       Case Management:  I have reviewed the care plan and MDS and do agree with the plan.   Information reviewed:  Medications, vital signs, orders, and nursing notes.    ROS:  4 point ROS neg other than  the symptoms noted above in the HPI    PHYSICAL EXAM:  /62  Pulse 68  Temp 96.5  F (35.8  C)  Resp 20  Wt 130 lb (59 kg)  SpO2 97%  BMI 23.78 kg/m2  Gen: laying in bed, alert, cooperative and in no acute distress  Resp: breathing non-labored  GI: abdomen soft, not-tender  Ext: no LE edema  Neuro: CX II-XII grossly in tact; ROM in all four extremities grossly in tact  Psych: alert and oriented to self and general situation; flat affect    Lab/Diagnostic data:  Reviewed as per Epic    ASSESSMENT/PLAN    1) Bipolar Disorder   Continues to have cyclical pattern to her moods, sometimes spending a couple days in bed. Overall trend has been stable. Follows with in-house psychiatry   -- continues on lamotrigine 300 mg qhs, mirtazapine 30 mg qhs, quetiapine 150 mg qhs and sertraline 150 mg daily  -- ongoing supportive cares  -- appreciate expertise of in house psych    2) Slow Transit Constipation   -- managed with Miralax 17g daily and Senna-S 2 tabs qhs  -- adjust bowel regimen as needed    3) Bilateral Impacted Cerumen   Failed management at facility and was seen by ENT on 9/26 for removal. Hearing now improved.   -- follow clinically    Maribel Sevilla is stable. No concerns per nursing. No changes to plan of care today.    Electronically signed by:  Felecia Elizabeth MD

## 2017-12-14 ENCOUNTER — NURSING HOME VISIT (OUTPATIENT)
Dept: GERIATRICS | Facility: CLINIC | Age: 82
End: 2017-12-14
Payer: COMMERCIAL

## 2017-12-14 VITALS
WEIGHT: 123.9 LBS | SYSTOLIC BLOOD PRESSURE: 104 MMHG | TEMPERATURE: 98.2 F | OXYGEN SATURATION: 97 % | DIASTOLIC BLOOD PRESSURE: 71 MMHG | BODY MASS INDEX: 22.66 KG/M2 | RESPIRATION RATE: 18 BRPM | HEART RATE: 84 BPM

## 2017-12-14 DIAGNOSIS — F31.4 BIPOLAR DISORDER, CURRENT EPISODE DEPRESSED, SEVERE, WITHOUT PSYCHOTIC FEATURES (H): Primary | ICD-10-CM

## 2017-12-14 DIAGNOSIS — R63.0 DECREASED APPETITE: ICD-10-CM

## 2017-12-14 DIAGNOSIS — R39.15 URINARY URGENCY: ICD-10-CM

## 2017-12-14 DIAGNOSIS — F33.2 SEVERE EPISODE OF RECURRENT MAJOR DEPRESSIVE DISORDER, WITHOUT PSYCHOTIC FEATURES (H): ICD-10-CM

## 2017-12-14 PROCEDURE — 99309 SBSQ NF CARE MODERATE MDM 30: CPT | Performed by: NURSE PRACTITIONER

## 2017-12-14 NOTE — LETTER
[facility]    Chief Complaint   Patient presents with     group home Regulatory       HPI:    Maribel Sevilla is a 85 year old  (12/20/1931), who is being seen today, Dec 14, 2017, for a federally mandated E/M visit at [facility].  HPI information obtained from: facility chart records, facility staff and patient report. Today's concerns are:  Bipolar disorder, current episode depressed, severe, without psychotic features (H)  Severe episode of recurrent major depressive disorder, without psychotic features (H)  Resident is seen today in bed - she notes that she has been feeling down since last week but thinks she is on the upswing, she is laying in bed reading her book in her pajamas this afternoon  She is followed by in-house psych  She is on a current regimen of Sertraline, Seroquel, Remeron, and Lamictal    Urinary urgency  Resident notes she is urinating okay, just has difficulty making it to the bathroom in time, safely. She states that this has been going on for quite a few months - she is wearing briefs for safety and and protection. We speak about decreasing her fluid intake closer to bed and about potential for medication management if she should wish in the future.    Decreased appetite  Resident notes a decrease in her appetite since her downswing. She states she is interested in bland foods and soups - she cannot tolerate the broth because it is too salty and states that the chicken noodle soup was also very salty with very few noodles or chiecken    ALLERGIES: No known allergies  PAST MEDICAL HISTORY:  has a past medical history of Anemia; Anxiety; Bipolar affective (H); Dementia; Depressive disorder; Gastro-oesophageal reflux disease; OA (osteoarthritis) of knee; and Renal insufficiencies, chronic.  PAST SURGICAL HISTORY:  has a past surgical history that includes Salpingo-oophorectomy bilateral; Hysterectomy; and Eye surgery.  FAMILY HISTORY: family history  includes C.A.D. in her brother, father, mother, and sister; Psychotic Disorder in her son.  SOCIAL HISTORY:  reports that she has never smoked. She has never used smokeless tobacco. She reports that she does not drink alcohol or use illicit drugs.    MEDICATIONS:  Current Outpatient Prescriptions   Medication Sig Dispense Refill     Stannous Fluoride (GEL-AMBAR) 0.4 % GEL Apply to affected area At Bedtime use a small amount of Gel-ambar after regular brushing , brush throughly, keep on teeth for a minute then spit , do not rinse.  Use at night before bed for cavity prevention .       lamoTRIgine (LAMICTAL) 150 MG tablet Take 250 mg by mouth At Bedtime        sertraline (ZOLOFT) 50 MG tablet Take 150 mg by mouth daily        ranitidine (RANITIDINE) 150 MG tablet Take 150 mg by mouth daily       ondansetron (ZOFRAN) 4 MG tablet Take 4 mg by mouth 4 times daily        QUEtiapine (SEROQUEL) 50 MG tablet Take 150 mg by mouth At Bedtime        mirtazapine (REMERON) 15 MG tablet Take 30 mg by mouth At Bedtime        acetaminophen (TYLENOL) 500 MG tablet Take 500 mg by mouth 2 times daily and 1 tab every 4 hours as needed       senna-docusate (SENNA S) 8.6-50 MG per tablet Take 2 tablets by mouth At Bedtime       donepezil (ARICEPT) 5 MG tablet Take 10 mg by mouth daily  30 tablet 0     polyethylene glycol (MIRALAX/GLYCOLAX) packet Take 17 g by mouth daily. 28 each 1     Multiple Vitamins-Minerals (OCUVITE ADULT FORMULA) CAPS Take 1 tablet by mouth daily        Medications reviewed:  Medications reconciled to facility chart and changes were made to reflect current medications as identified as above med list. Below are the changes that were made:   Medications stopped since last EPIC medication reconciliation:   There are no discontinued medications.    Medications started since last [facility] medication reconciliation:  No orders of the defined types were placed in this encounter.    Case Management:  I have reviewed the care plan  and MDS and do agree with the plan. Patient's desire to return to the community is present, but is not able due to care needs .  Information reviewed:  Medications, vital signs, orders, and nursing notes.    ROS:  4 point ROS including Respiratory, CV, GI and , other than that noted in the HPI,  is negative    Exam:  Vitals: /71  Pulse 84  Temp 98.2  F (36.8  C)  Resp 18  Wt 123 lb 14.4 oz (56.2 kg)  SpO2 97%  BMI 22.66 kg/m2  BMI= Body mass index is 22.66 kg/(m^2).  BP Readin/75, 105/65, 134/73           HR:  72-88  GENERAL APPEARANCE:  Alert, in no distress, oriented, cooperative elderly woman resting in bed  ENT:  Mouth and posterior oropharynx normal, moist mucous membranes, [place]  EYES:  EOM, conjunctivae, lids, pupils and irises normal  NECK:  No adenopathy, masses or thyromegaly  RESP:  Respiratory effort and palpation of chest normal, lungs clear to auscultation, no respiratory distress  CV:  Palpation and auscultation of heart done, regular rate and rhythm, no murmur, rub, or gallop, no edema, +2 pedal pulses  ABDOMEN: Active bowel sounds x4, soft, nontender, no hepatosplenomegaly or other masses  M/S:   Gait and station normal; Digits and nails abnormal - arthritic changes present  NEURO:   Cranial nerves 2-12 are normal tested and grossly at patient's baseline  PSYCH:  oriented X 3, normal insight, judgement and memory, affect and mood Pleasant but withdrawn    Lab/Diagnostic data:    CBC RESULTS:   Recent Labs   Lab Test 08/02/17 01/18/17   09/02/15   1933   12   1225   WBC  4.9  5.1   < >  7.7   < >  5.6   RBC  2.87*  3.17*   < >  3.67*   < >  3.91   HGB  8.2*  9.8*   < >  11.5*   < >  12.2   HCT  26.8*  31.0*   < >  35.3   < >  36.6   MCV  93.4  97.8   < >  96   < >  94   MCH   --    --    --   31.3   --   31.2   MCHC   --    --    --   32.6   --   33.3   RDW  13.9  12.9   < >  12.7   < >  12.2   PLT  226  215   < >  249   < >  173    < > = values in this interval not  displayed.       Last Basic Metabolic Panel:  Recent Labs   Lab Test 08/02/17 02/06/17   NA  142  144   POTASSIUM  4.0  4.4   CHLORIDE  110*  109   RILEY  9.5  9.7   CO2  25  25   BUN  30*  31*   CR  2.80*  2.77*   GLC  138*  111*       Liver Function Studies -   Recent Labs   Lab Test 08/02/17 09/08/11   0705   PROTTOTAL  6.2*  6.2*   ALBUMIN  3.0*  3.5   BILITOTAL  0.2  0.5   ALKPHOS  81  86   AST  27  33   ALT  13  25       TSH   Date Value Ref Range Status   09/09/2011 2.14 0.4 - 5.0 mU/L Final         ASSESSMENT/PLAN   Diagnosis Comments   1. Bipolar disorder, current episode depressed, severe, without psychotic features (H)  Continue monitoring per psych  Monitor for ongoing downswing and continue to urge resident to be active within facility   2. Severe episode of recurrent major depressive disorder, without psychotic features (H)  As noted above   3. Urinary urgency  Decrease fluids at night as above  F/u per resident request   4. Decreased appetite  Find foods that interest resident and are not salty or upsetting to stomach     Electronically signed by:  ASCENCION Rm CNP

## 2017-12-14 NOTE — PROGRESS NOTES
[facility]    Chief Complaint   Patient presents with     detention Regulatory       HPI:    Maribel Sevilla is a 85 year old  (12/20/1931), who is being seen today, Dec 14, 2017, for a federally mandated E/M visit at [facility].  HPI information obtained from: facility chart records, facility staff and patient report. Today's concerns are:  Bipolar disorder, current episode depressed, severe, without psychotic features (H)  Severe episode of recurrent major depressive disorder, without psychotic features (H)  Resident is seen today in bed - she notes that she has been feeling down since last week but thinks she is on the upswing, she is laying in bed reading her book in her pajamas this afternoon  She is followed by in-house psych  She is on a current regimen of Sertraline, Seroquel, Remeron, and Lamictal    Urinary urgency  Resident notes she is urinating okay, just has difficulty making it to the bathroom in time, safely. She states that this has been going on for quite a few months - she is wearing briefs for safety and and protection. We speak about decreasing her fluid intake closer to bed and about potential for medication management if she should wish in the future.    Decreased appetite  Resident notes a decrease in her appetite since her downswing. She states she is interested in bland foods and soups - she cannot tolerate the broth because it is too salty and states that the chicken noodle soup was also very salty with very few noodles or chiecken    ALLERGIES: No known allergies  PAST MEDICAL HISTORY:  has a past medical history of Anemia; Anxiety; Bipolar affective (H); Dementia; Depressive disorder; Gastro-oesophageal reflux disease; OA (osteoarthritis) of knee; and Renal insufficiencies, chronic.  PAST SURGICAL HISTORY:  has a past surgical history that includes Salpingo-oophorectomy bilateral; Hysterectomy; and Eye surgery.  FAMILY HISTORY: family history  includes C.A.D. in her brother, father, mother, and sister; Psychotic Disorder in her son.  SOCIAL HISTORY:  reports that she has never smoked. She has never used smokeless tobacco. She reports that she does not drink alcohol or use illicit drugs.    MEDICATIONS:  Current Outpatient Prescriptions   Medication Sig Dispense Refill     Stannous Fluoride (GEL-AMBAR) 0.4 % GEL Apply to affected area At Bedtime use a small amount of Gel-ambar after regular brushing , brush throughly, keep on teeth for a minute then spit , do not rinse.  Use at night before bed for cavity prevention .       lamoTRIgine (LAMICTAL) 150 MG tablet Take 250 mg by mouth At Bedtime        sertraline (ZOLOFT) 50 MG tablet Take 150 mg by mouth daily        ranitidine (RANITIDINE) 150 MG tablet Take 150 mg by mouth daily       ondansetron (ZOFRAN) 4 MG tablet Take 4 mg by mouth 4 times daily        QUEtiapine (SEROQUEL) 50 MG tablet Take 150 mg by mouth At Bedtime        mirtazapine (REMERON) 15 MG tablet Take 30 mg by mouth At Bedtime        acetaminophen (TYLENOL) 500 MG tablet Take 500 mg by mouth 2 times daily and 1 tab every 4 hours as needed       senna-docusate (SENNA S) 8.6-50 MG per tablet Take 2 tablets by mouth At Bedtime       donepezil (ARICEPT) 5 MG tablet Take 10 mg by mouth daily  30 tablet 0     polyethylene glycol (MIRALAX/GLYCOLAX) packet Take 17 g by mouth daily. 28 each 1     Multiple Vitamins-Minerals (OCUVITE ADULT FORMULA) CAPS Take 1 tablet by mouth daily        Medications reviewed:  Medications reconciled to facility chart and changes were made to reflect current medications as identified as above med list. Below are the changes that were made:   Medications stopped since last EPIC medication reconciliation:   There are no discontinued medications.    Medications started since last [facility] medication reconciliation:  No orders of the defined types were placed in this encounter.    Case Management:  I have reviewed the care plan  and MDS and do agree with the plan. Patient's desire to return to the community is present, but is not able due to care needs .  Information reviewed:  Medications, vital signs, orders, and nursing notes.    ROS:  4 point ROS including Respiratory, CV, GI and , other than that noted in the HPI,  is negative    Exam:  Vitals: /71  Pulse 84  Temp 98.2  F (36.8  C)  Resp 18  Wt 123 lb 14.4 oz (56.2 kg)  SpO2 97%  BMI 22.66 kg/m2  BMI= Body mass index is 22.66 kg/(m^2).  BP Readin/75, 105/65, 134/73           HR:  72-88  GENERAL APPEARANCE:  Alert, in no distress, oriented, cooperative elderly woman resting in bed  ENT:  Mouth and posterior oropharynx normal, moist mucous membranes, [place]  EYES:  EOM, conjunctivae, lids, pupils and irises normal  NECK:  No adenopathy, masses or thyromegaly  RESP:  Respiratory effort and palpation of chest normal, lungs clear to auscultation, no respiratory distress  CV:  Palpation and auscultation of heart done, regular rate and rhythm, no murmur, rub, or gallop, no edema, +2 pedal pulses  ABDOMEN: Active bowel sounds x4, soft, nontender, no hepatosplenomegaly or other masses  M/S:   Gait and station normal; Digits and nails abnormal - arthritic changes present  NEURO:   Cranial nerves 2-12 are normal tested and grossly at patient's baseline  PSYCH:  oriented X 3, normal insight, judgement and memory, affect and mood Pleasant but withdrawn    Lab/Diagnostic data:    CBC RESULTS:   Recent Labs   Lab Test 08/02/17 01/18/17   09/02/15   1933   12   1225   WBC  4.9  5.1   < >  7.7   < >  5.6   RBC  2.87*  3.17*   < >  3.67*   < >  3.91   HGB  8.2*  9.8*   < >  11.5*   < >  12.2   HCT  26.8*  31.0*   < >  35.3   < >  36.6   MCV  93.4  97.8   < >  96   < >  94   MCH   --    --    --   31.3   --   31.2   MCHC   --    --    --   32.6   --   33.3   RDW  13.9  12.9   < >  12.7   < >  12.2   PLT  226  215   < >  249   < >  173    < > = values in this interval not  displayed.       Last Basic Metabolic Panel:  Recent Labs   Lab Test 08/02/17 02/06/17   NA  142  144   POTASSIUM  4.0  4.4   CHLORIDE  110*  109   RILEY  9.5  9.7   CO2  25  25   BUN  30*  31*   CR  2.80*  2.77*   GLC  138*  111*       Liver Function Studies -   Recent Labs   Lab Test 08/02/17 09/08/11   0705   PROTTOTAL  6.2*  6.2*   ALBUMIN  3.0*  3.5   BILITOTAL  0.2  0.5   ALKPHOS  81  86   AST  27  33   ALT  13  25       TSH   Date Value Ref Range Status   09/09/2011 2.14 0.4 - 5.0 mU/L Final         ASSESSMENT/PLAN   Diagnosis Comments   1. Bipolar disorder, current episode depressed, severe, without psychotic features (H)  Continue monitoring per psych  Monitor for ongoing downswing and continue to urge resident to be active within facility   2. Severe episode of recurrent major depressive disorder, without psychotic features (H)  As noted above   3. Urinary urgency  Decrease fluids at night as above  F/u per resident request   4. Decreased appetite  Find foods that interest resident and are not salty or upsetting to stomach     Electronically signed by:  ASCENCION Rm CNP

## 2017-12-20 PROBLEM — R39.15 URINARY URGENCY: Status: ACTIVE | Noted: 2017-12-14

## 2018-02-25 VITALS
HEART RATE: 75 BPM | SYSTOLIC BLOOD PRESSURE: 106 MMHG | WEIGHT: 125 LBS | OXYGEN SATURATION: 95 % | DIASTOLIC BLOOD PRESSURE: 63 MMHG | TEMPERATURE: 98.4 F | BODY MASS INDEX: 22.86 KG/M2 | RESPIRATION RATE: 18 BRPM

## 2018-02-26 ENCOUNTER — NURSING HOME VISIT (OUTPATIENT)
Dept: GERIATRICS | Facility: CLINIC | Age: 83
End: 2018-02-26
Payer: MEDICARE

## 2018-02-26 DIAGNOSIS — N18.4 CHRONIC KIDNEY DISEASE, STAGE IV (SEVERE) (H): ICD-10-CM

## 2018-02-26 DIAGNOSIS — F03.90 DEMENTIA WITHOUT BEHAVIORAL DISTURBANCE, UNSPECIFIED DEMENTIA TYPE: ICD-10-CM

## 2018-02-26 DIAGNOSIS — F31.9 BIPOLAR AFFECTIVE DISORDER, REMISSION STATUS UNSPECIFIED (H): Primary | ICD-10-CM

## 2018-02-26 PROCEDURE — 99309 SBSQ NF CARE MODERATE MDM 30: CPT | Performed by: INTERNAL MEDICINE

## 2018-02-26 NOTE — PROGRESS NOTES
[facility]  Nursing Home Regulatory Visit  February 26, 2018    Chief Complaint   Patient presents with     skilled nursing Regulatory       HPI:    Maribel Sevilla is a 86 year old  (12/20/1931), who is being seen today for a federally mandated E/M visit at [facility]. Today's concerns are:    1) Bipolar Disorder -- She has ongoing cyclical pattern to her moods, sometimes spending a couple days in bed. Overall trend has been stable. Follows with in-house psychiatry and managed with lamotrigine 200 mg qhs, mirtazapine 30 mg qhs, quetiapine 150 mg qhs and sertraline 150 mg daily  2) CKD, Stage IV -- baseline Cr 2.3-2.8 in past year. GFR around 15-19. Lytes OK. Unclear etiology of her CKD - no DM nor HTN.   3) Dementia Without Behavioral Disturbance -- Chronic. Progressive. Managed with donepezil 10 mg daily.     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 Mother      ?     C.A.D. Father      ?     C.A.D. Sister      ?     C.A.D. Brother      ?     Psychotic Disorder Son      suicide       SOCIAL HISTORY:   Lives in a SNF    MEDICATIONS:  Current Outpatient Prescriptions   Medication Sig Dispense Refill     Stannous Fluoride (GEL-AMBAR) 0.4 % GEL Apply to affected area At Bedtime use a small amount of Gel-ambar after regular brushing , brush throughly, keep on teeth for a minute then spit , do not rinse.  Use at night before bed for cavity prevention .        lamoTRIgine (LAMICTAL) 150 MG tablet Take 200 mg by mouth At Bedtime        sertraline (ZOLOFT) 50 MG tablet Take 150 mg by mouth daily        ranitidine (RANITIDINE) 150 MG tablet Take 150 mg by mouth daily       ondansetron (ZOFRAN) 4 MG tablet Take 4 mg by mouth 4 times daily        QUEtiapine (SEROQUEL) 50 MG tablet Take 150 mg by mouth At Bedtime        mirtazapine (REMERON) 15 MG tablet Take 30 mg by mouth At Bedtime        acetaminophen (TYLENOL) 500 MG tablet Take 500 mg by mouth 2 times daily and 1 tab every 4 hours as needed       senna-docusate (SENNA S) 8.6-50 MG per tablet Take 2 tablets by mouth At Bedtime       donepezil (ARICEPT) 5 MG tablet Take 10 mg by mouth daily  30 tablet 0     polyethylene glycol (MIRALAX/GLYCOLAX) packet Take 17 g by mouth daily. 28 each 1     Multiple Vitamins-Minerals (OCUVITE ADULT FORMULA) CAPS Take 1 tablet by mouth daily          Medications reviewed:  Medications reconciled to facility chart and changes were made to reflect current medications as identified as above med list. Below are the changes that were made:   Medications stopped since last EPIC medication reconciliation:   There are no discontinued medications.  Medications started since last [facility] medication reconciliation:  No orders of the defined types were placed in this encounter.      Case Management:  I have reviewed the care plan and MDS and do agree with the plan.   Information reviewed:  Medications, vital signs, orders, and nursing notes.    ROS:  Unable to be obtained due to cognitive impairment or aphasia.     PHYSICAL EXAM:  /63  Pulse 75  Temp 98.4  F (36.9  C)  Resp 18  Wt 125 lb (56.7 kg)  SpO2 95%  BMI 22.86 kg/m2  Gen: sitting in bed, alert, cooperative and in no acute distress  Resp: breathing non-labored  GI: abdomen soft, not-tender  Ext: no LE edema  Neuro: CX II-XII grossly in tact; ROM in all four extremities grossly in tact  Psych: memory, judgement and insight  impaired    Lab/Diagnostic data:  Reviewed as per Epic    ASSESSMENT/PLAN    1) Bipolar Disorder   She has ongoing cyclical pattern to her moods, sometimes spending a couple days in bed. Overall trend has been stable. Follows with in-house psychiatry.  -- continues on lamotrigine 200 mg qhs, mirtazapine 30 mg qhs, quetiapine 150 mg qhs and sertraline 150 mg daily  -- supportive cares  -- no GDR today in mirtazapine, quetiapine or sertraline - has had prior GDRs and is at lower effective dose to manage her bipolar disorder safely (she continues to cycle despite these medications).   -- appreciate expertise of in house psychiatry who actively manages her bipolar disorder      2) CKD, Stage IV   Baseline Cr 2.3-2.8 in past year. GFR around 15-19. Lytes OK. Unclear etiology of her CKD - no DM nor HTN.   -- BMP at least q6h mos    3) Dementia Without Behavioral Disturbance   Chronic. Progressive.   -- continues on donepezil 10 mg daily  -- ongoing 24/7 nursing and supportive cares       Electronically signed by:  Felecia Elizabeth MD

## 2018-04-18 VITALS
RESPIRATION RATE: 20 BRPM | TEMPERATURE: 98.3 F | SYSTOLIC BLOOD PRESSURE: 107 MMHG | OXYGEN SATURATION: 96 % | DIASTOLIC BLOOD PRESSURE: 69 MMHG | HEART RATE: 75 BPM

## 2018-04-18 NOTE — PROGRESS NOTES
"  [facility]    Chief Complaint   Patient presents with     long term Regulatory       HPI:    Maribel Sevilla is a 86 year old  (12/20/1931), who is being [facility] today [facility]  HPI information obtained from: facility chart records, facility staff, patient report and [facility] chart review. Today's concerns are:  CKD (chronic kidney disease) stage 4, GFR 15-29 ml/min (H)  Chronic  Resident notes she is voiding without difficulty \"a normal amount\" throughout the day  Lab Results   Component Value Date    CR 2.80 08/02/2017     Bipolar disorder, current episode depressed, severe, without psychotic features (H)  Severe episode of recurrent major depressive disorder, without psychotic features (H)  Managed by in-house psych  Is seen today sitting up in recliner in room, clothed, bright-eyed, reading a book  She appears very content and relaxed - she notes that she is doing \"much better\" and states that she recently had a period where she was feeling quite down  She continues on a regimen of Aricept, Lamictal, Remeron and Seroquel  Last PHQ9: 00/27    Dental caries  Today patient notes a concern with her teeth - she notes having many crowns and a partial and she is requesting to f/u with a dentist for assessment of any needed care    ALLERGIES: No known allergies  PAST MEDICAL HISTORY:  has a past medical history of Anemia; Anxiety; Bipolar affective (H); Dementia; Depressive disorder; Gastro-oesophageal reflux disease; OA (osteoarthritis) of knee; and Renal insufficiencies, chronic.  PAST SURGICAL HISTORY:  has a past surgical history that includes Salpingo-oophorectomy bilateral; Hysterectomy; and Eye surgery.  FAMILY HISTORY: family history includes C.A.D. in her brother, father, mother, and sister; Psychotic Disorder in her son.  SOCIAL HISTORY:  reports that she has never smoked. She has never used smokeless tobacco. She reports " that she does not drink alcohol or use illicit drugs.    MEDICATIONS:  Current Outpatient Prescriptions   Medication Sig Dispense Refill     acetaminophen (TYLENOL) 500 MG tablet Take 500 mg by mouth 2 times daily and 1 tab every 4 hours as needed       donepezil (ARICEPT) 5 MG tablet Take 10 mg by mouth daily  30 tablet 0     lamoTRIgine (LAMICTAL) 150 MG tablet Take 200 mg by mouth At Bedtime        mirtazapine (REMERON) 15 MG tablet Take 30 mg by mouth At Bedtime        Multiple Vitamins-Minerals (OCUVITE ADULT FORMULA) CAPS Take 1 tablet by mouth daily        ondansetron (ZOFRAN) 4 MG tablet Take 4 mg by mouth 4 times daily        polyethylene glycol (MIRALAX/GLYCOLAX) packet Take 17 g by mouth daily. 28 each 1     QUEtiapine (SEROQUEL) 50 MG tablet Take 150 mg by mouth At Bedtime        ranitidine (RANITIDINE) 150 MG tablet Take 150 mg by mouth daily       senna-docusate (SENNA S) 8.6-50 MG per tablet Take 2 tablets by mouth At Bedtime       sertraline (ZOLOFT) 50 MG tablet Take 150 mg by mouth daily        Stannous Fluoride (GEL-AMBAR) 0.4 % GEL Apply to affected area At Bedtime use a small amount of Gel-ambar after regular brushing , brush throughly, keep on teeth for a minute then spit , do not rinse.  Use at night before bed for cavity prevention .       Medications reviewed:  Medications reconciled to facility chart and changes were made to reflect current medications as identified as above med list. Below are the changes that were made:   Medications stopped since last EPIC medication reconciliation:   There are no discontinued medications.    Medications started since last [facility] medication reconciliation:  No orders of the defined types were placed in this encounter.    Case Management:  I have reviewed the care plan and MDS and do agree with the plan. Patient's desire to return to the community is present, but is not able due to care needs .  Information reviewed:  Medications, vital signs, orders, and  nursing notes.    ROS:  4 point ROS including Respiratory, CV, GI and , other than that noted in the HPI,  is negative    Exam:  Vitals: /69  Pulse 75  Temp 98.3  F (36.8  C)  Resp 20  SpO2 96%  BMI= There is no height or weight on file to calculate BMI.  GENERAL APPEARANCE:  Alert, in no distress, oriented, cooperative elderly woman sitting in recliner reading a book  ENT:  Mouth and posterior oropharynx normal, moist mucous membranes, [place]  EYES:  EOM, conjunctivae, lids, pupils and irises normal  NECK:  No adenopathy, masses or thyromegaly  RESP:  Respiratory effort and palpation of chest normal, lungs clear to auscultation, no respiratory distress  CV:  Palpation and auscultation of heart done, regular rate and rhythm, no murmur, rub, or gallop, no edema, +2 pedal pulses  ABDOMEN: Active bowel sounds x4, soft, nontender, no hepatosplenomegaly or other masses  M/S:   Gait and station normal; Digits and nails abnormal - arthritic changes present  NEURO:   Cranial nerves 2-12 are normal tested and grossly at patient's baseline  PSYCH:  oriented X 3, normal insight, judgement and memory, affect and mood Pleasant and relaxed today    Lab/Diagnostic data:   CBC RESULTS:   Recent Labs   Lab Test 08/02/17 01/18/17   09/02/15   1933   07/19/12   1225   WBC  4.9  5.1   < >  7.7   < >  5.6   RBC  2.87*  3.17*   < >  3.67*   < >  3.91   HGB  8.2*  9.8*   < >  11.5*   < >  12.2   HCT  26.8*  31.0*   < >  35.3   < >  36.6   MCV  93.4  97.8   < >  96   < >  94   MCH   --    --    --   31.3   --   31.2   MCHC   --    --    --   32.6   --   33.3   RDW  13.9  12.9   < >  12.7   < >  12.2   PLT  226  215   < >  249   < >  173    < > = values in this interval not displayed.       Last Basic Metabolic Panel:  Recent Labs   Lab Test 08/02/17 02/06/17   NA  142  144   POTASSIUM  4.0  4.4   CHLORIDE  110*  109   RILEY  9.5  9.7   CO2  25  25   BUN  30*  31*   CR  2.80*  2.77*   GLC  138*  111*       Liver Function Studies -    Recent Labs   Lab Test 08/02/17 09/08/11   0705   PROTTOTAL  6.2*  6.2*   ALBUMIN  3.0*  3.5   BILITOTAL  0.2  0.5   ALKPHOS  81  86   AST  27  33   ALT  13  25       TSH   Date Value Ref Range Status   09/09/2011 2.14 0.4 - 5.0 mU/L Final   ]    No results found for: A1C    ASSESSMENT/PLAN   Diagnosis Comments   1. CKD (chronic kidney disease) stage 4, GFR 15-29 ml/min (H)  Chronic  Making urine without difficulty  Monitor with annual labs or change in status   2. Bipolar disorder, current episode depressed, severe, without psychotic features (H)   Severe episode of recurrent major depressive disorder, without psychotic features (H) Is doing well today on current regimen  Continues to have peaks/troughs in levels of depression  Continue current regimen/POC as ordered   3. Dental caries Referral to in-house dental     Electronically signed by:  ASCENCION Rm, HARRY  [facility]

## 2018-04-19 ENCOUNTER — NURSING HOME VISIT (OUTPATIENT)
Dept: GERIATRICS | Facility: CLINIC | Age: 83
End: 2018-04-19
Payer: MEDICARE

## 2018-04-19 DIAGNOSIS — N18.4 CKD (CHRONIC KIDNEY DISEASE) STAGE 4, GFR 15-29 ML/MIN (H): Primary | ICD-10-CM

## 2018-04-19 DIAGNOSIS — K02.9 DENTAL CARIES: ICD-10-CM

## 2018-04-19 DIAGNOSIS — F31.4 BIPOLAR DISORDER, CURRENT EPISODE DEPRESSED, SEVERE, WITHOUT PSYCHOTIC FEATURES (H): ICD-10-CM

## 2018-04-19 DIAGNOSIS — F33.2 SEVERE EPISODE OF RECURRENT MAJOR DEPRESSIVE DISORDER, WITHOUT PSYCHOTIC FEATURES (H): ICD-10-CM

## 2018-04-19 PROCEDURE — 99309 SBSQ NF CARE MODERATE MDM 30: CPT | Performed by: NURSE PRACTITIONER

## 2018-04-19 NOTE — LETTER
"    4/19/2018        RE: Maribel Sevilla  [facility] RESIDENCE  [address] [place] [id number]          [facility]    Chief Complaint   Patient presents with     FDC Regulatory       HPI:    Maribel Sevilla is a 86 year old  (12/20/1931), who is being seen today for a federally mandated E/M visit at [facility].  HPI information obtained from: facility chart records, facility staff, patient report and [facility] chart review. Today's concerns are:  CKD (chronic kidney disease) stage 4, GFR 15-29 ml/min (H)  Chronic  Resident notes she is voiding without difficulty \"a normal amount\" throughout the day  Lab Results   Component Value Date    CR 2.80 08/02/2017     Bipolar disorder, current episode depressed, severe, without psychotic features (H)  Severe episode of recurrent major depressive disorder, without psychotic features (H)  Managed by in-house psych  Is seen today sitting up in recliner in room, clothed, bright-eyed, reading a book  She appears very content and relaxed - she notes that she is doing \"much better\" and states that she recently had a period where she was feeling quite down  She continues on a regimen of Aricept, Lamictal, Remeron and Seroquel  Last PHQ9: 00/27    Dental caries  Today patient notes a concern with her teeth - she notes having many crowns and a partial and she is requesting to f/u with a dentist for assessment of any needed care    ALLERGIES: No known allergies  PAST MEDICAL HISTORY:  has a past medical history of Anemia; Anxiety; Bipolar affective (H); Dementia; Depressive disorder; Gastro-oesophageal reflux disease; OA (osteoarthritis) of knee; and Renal insufficiencies, chronic.  PAST SURGICAL HISTORY:  has a past surgical history that includes Salpingo-oophorectomy bilateral; Hysterectomy; and Eye surgery.  FAMILY HISTORY: family history includes C.A.D. in her brother, father, mother, and sister; Psychotic Disorder in her " son.  SOCIAL HISTORY:  reports that she has never smoked. She has never used smokeless tobacco. She reports that she does not drink alcohol or use illicit drugs.    MEDICATIONS:  Current Outpatient Prescriptions   Medication Sig Dispense Refill     acetaminophen (TYLENOL) 500 MG tablet Take 500 mg by mouth 2 times daily and 1 tab every 4 hours as needed       donepezil (ARICEPT) 5 MG tablet Take 10 mg by mouth daily  30 tablet 0     lamoTRIgine (LAMICTAL) 150 MG tablet Take 200 mg by mouth At Bedtime        mirtazapine (REMERON) 15 MG tablet Take 30 mg by mouth At Bedtime        Multiple Vitamins-Minerals (OCUVITE ADULT FORMULA) CAPS Take 1 tablet by mouth daily        ondansetron (ZOFRAN) 4 MG tablet Take 4 mg by mouth 4 times daily        polyethylene glycol (MIRALAX/GLYCOLAX) packet Take 17 g by mouth daily. 28 each 1     QUEtiapine (SEROQUEL) 50 MG tablet Take 150 mg by mouth At Bedtime        ranitidine (RANITIDINE) 150 MG tablet Take 150 mg by mouth daily       senna-docusate (SENNA S) 8.6-50 MG per tablet Take 2 tablets by mouth At Bedtime       sertraline (ZOLOFT) 50 MG tablet Take 150 mg by mouth daily        Stannous Fluoride (GEL-AMBAR) 0.4 % GEL Apply to affected area At Bedtime use a small amount of Gel-ambar after regular brushing , brush throughly, keep on teeth for a minute then spit , do not rinse.  Use at night before bed for cavity prevention .       Medications reviewed:  Medications reconciled to facility chart and changes were made to reflect current medications as identified as above med list. Below are the changes that were made:   Medications stopped since last EPIC medication reconciliation:   There are no discontinued medications.    Medications started since last [facility] medication reconciliation:  No orders of the defined types were placed in this encounter.    Case Management:  I have reviewed the care plan and MDS and do agree with the plan. Patient's desire to return to the community is  present, but is not able due to care needs .  Information reviewed:  Medications, vital signs, orders, and nursing notes.    ROS:  4 point ROS including Respiratory, CV, GI and , other than that noted in the HPI,  is negative    Exam:  Vitals: /69  Pulse 75  Temp 98.3  F (36.8  C)  Resp 20  SpO2 96%  BMI= There is no height or weight on file to calculate BMI.  GENERAL APPEARANCE:  Alert, in no distress, oriented, cooperative elderly woman sitting in recliner reading a book  ENT:  Mouth and posterior oropharynx normal, moist mucous membranes, [place]  EYES:  EOM, conjunctivae, lids, pupils and irises normal  NECK:  No adenopathy, masses or thyromegaly  RESP:  Respiratory effort and palpation of chest normal, lungs clear to auscultation, no respiratory distress  CV:  Palpation and auscultation of heart done, regular rate and rhythm, no murmur, rub, or gallop, no edema, +2 pedal pulses  ABDOMEN: Active bowel sounds x4, soft, nontender, no hepatosplenomegaly or other masses  M/S:   Gait and station normal; Digits and nails abnormal - arthritic changes present  NEURO:   Cranial nerves 2-12 are normal tested and grossly at patient's baseline  PSYCH:  oriented X 3, normal insight, judgement and memory, affect and mood Pleasant and relaxed today    Lab/Diagnostic data:   CBC RESULTS:   Recent Labs   Lab Test 08/02/17 01/18/17   09/02/15   1933   07/19/12   1225   WBC  4.9  5.1   < >  7.7   < >  5.6   RBC  2.87*  3.17*   < >  3.67*   < >  3.91   HGB  8.2*  9.8*   < >  11.5*   < >  12.2   HCT  26.8*  31.0*   < >  35.3   < >  36.6   MCV  93.4  97.8   < >  96   < >  94   MCH   --    --    --   31.3   --   31.2   MCHC   --    --    --   32.6   --   33.3   RDW  13.9  12.9   < >  12.7   < >  12.2   PLT  226  215   < >  249   < >  173    < > = values in this interval not displayed.       Last Basic Metabolic Panel:  Recent Labs   Lab Test 08/02/17 02/06/17   NA  142  144   POTASSIUM  4.0  4.4   CHLORIDE  110*  109   RILEY   9.5  9.7   CO2  25  25   BUN  30*  31*   CR  2.80*  2.77*   GLC  138*  111*       Liver Function Studies -   Recent Labs   Lab Test 08/02/17 09/08/11   0705   PROTTOTAL  6.2*  6.2*   ALBUMIN  3.0*  3.5   BILITOTAL  0.2  0.5   ALKPHOS  81  86   AST  27  33   ALT  13  25       TSH   Date Value Ref Range Status   09/09/2011 2.14 0.4 - 5.0 mU/L Final   ]    No results found for: A1C    ASSESSMENT/PLAN   Diagnosis Comments   1. CKD (chronic kidney disease) stage 4, GFR 15-29 ml/min (H)  Chronic  Making urine without difficulty  Monitor with annual labs or change in status   2. Bipolar disorder, current episode depressed, severe, without psychotic features (H)   Severe episode of recurrent major depressive disorder, without psychotic features (H) Is doing well today on current regimen  Continues to have peaks/troughs in levels of depression  Continue current regimen/POC as ordered   3. Dental caries Referral to in-house dental     Electronically signed by:  ASCENCION Rm, HARRY  [facility]      Sincerely,        ASCENCION Rm CNP
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
## 2018-04-26 PROBLEM — K02.9 DENTAL CARIES: Status: ACTIVE | Noted: 2018-04-26

## 2018-06-19 ENCOUNTER — CLINICAL UPDATE (OUTPATIENT)
Dept: PHARMACY | Facility: CLINIC | Age: 83
End: 2018-06-19

## 2018-06-19 NOTE — PROGRESS NOTES
This patient's medication list and chart were reviewed as part of the service provided by [facility].    Assessment/Recommendations:  1. (Lewy Body Dementia):  See previous med review from 8/29/17 regarding interactions.  Please consider taper/d'c of Donepezil; consider reduction to 5mg and monitor.  If no changes noted, d'c and monitor.  May be contributing to g.i. Side effects, resulting in scheduled Zofran.  Additionally, Donepezil may contribute to urinary incontinence & frequency.  2. (GERD):  Please consider switch from Ranitidine 150mg daily to Famotidine 20 or 40mg daily.  Famotidine is less anticholinergic, therefore lower risk of conufsion associated with it.      Jade Wade, Pharm.D.,[facility] [place]  Board Certified Geriatric Pharmacist  Medication Therapy Management Pharmacist  [phone]

## 2018-06-28 ENCOUNTER — NURSING HOME VISIT (OUTPATIENT)
Dept: GERIATRICS | Facility: CLINIC | Age: 83
End: 2018-06-28
Payer: COMMERCIAL

## 2018-06-28 VITALS
SYSTOLIC BLOOD PRESSURE: 104 MMHG | RESPIRATION RATE: 18 BRPM | OXYGEN SATURATION: 95 % | WEIGHT: 126.4 LBS | BODY MASS INDEX: 23.26 KG/M2 | HEART RATE: 74 BPM | DIASTOLIC BLOOD PRESSURE: 67 MMHG | HEIGHT: 62 IN | TEMPERATURE: 99.4 F

## 2018-06-28 DIAGNOSIS — N18.4 CKD (CHRONIC KIDNEY DISEASE) STAGE 4, GFR 15-29 ML/MIN (H): ICD-10-CM

## 2018-06-28 DIAGNOSIS — F31.9 BIPOLAR AFFECTIVE DISORDER, REMISSION STATUS UNSPECIFIED (H): Primary | ICD-10-CM

## 2018-06-28 DIAGNOSIS — K59.01 SLOW TRANSIT CONSTIPATION: ICD-10-CM

## 2018-06-28 PROCEDURE — 99309 SBSQ NF CARE MODERATE MDM 30: CPT | Performed by: INTERNAL MEDICINE

## 2018-06-28 NOTE — LETTER
6/28/2018        RE: Maribel Sevilla  [facility]  [address] [id number]        [facility]  Nursing Home Regulatory Visit  June 28, 2018    Chief Complaint   Patient presents with     [facility] Regulatory       HPI:    Maribel Sevilla is a 86 year old  (12/20/1931), who is being seen today for a federally mandated E/M visit at [facility]. Today's concerns are:    1) Bipolar Disorder -- She has ongoing cyclical pattern to her moods, sometimes spending a couple days in bed. Today seen sitting in her recliner reading a book in very happy mood. Follows with in-house psychiatry and managed with lamotrigine 200 mg qhs, mirtazapine 30 mg qhs, quetiapine 150 mg qhs and sertraline 150 mg daily  2) CKD, Stage IV -- baseline Cr 2.7-2.8 range. GFR around 15. Unclear etiology of her CKD - no DM nor HTN. No peripheral edema.   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 Mother      ?     C.A.D. Father      ?     C.A.D. Sister      ?     C.A.D. Brother      ?     Psychotic Disorder Son      suicide       SOCIAL HISTORY:   Lives in a SNF    MEDICATIONS:  Current Outpatient Prescriptions   Medication Sig Dispense Refill     acetaminophen (TYLENOL) 500 MG tablet Take 500 mg by mouth 2 times daily and 1 tab every 4 hours as needed       donepezil  "(ARICEPT) 5 MG tablet Take 5 mg by mouth 2 times daily  30 tablet 0     lamoTRIgine (LAMICTAL) 150 MG tablet Take 200 mg by mouth At Bedtime        melatonin 5 MG tablet Take 5 mg by mouth At Bedtime       mirtazapine (REMERON) 15 MG tablet Take 30 mg by mouth At Bedtime        Multiple Vitamins-Minerals (OCUVITE ADULT FORMULA) CAPS Take 1 tablet by mouth daily        ondansetron (ZOFRAN) 4 MG tablet Take 4 mg by mouth 4 times daily        polyethylene glycol (MIRALAX/GLYCOLAX) packet Take 17 g by mouth daily. 28 each 1     QUEtiapine (SEROQUEL) 50 MG tablet Take 150 mg by mouth At Bedtime        ranitidine (RANITIDINE) 150 MG tablet Take 150 mg by mouth daily       senna-docusate (SENNA S) 8.6-50 MG per tablet Take 2 tablets by mouth At Bedtime       sertraline (ZOLOFT) 50 MG tablet Take 150 mg by mouth daily          Medications reviewed:  Medications reconciled to facility chart and changes were made to reflect current medications as identified as above med list. Below are the changes that were made:   Medications stopped since last EPIC medication reconciliation:   Medications Discontinued During This Encounter   Medication Reason     Stannous Fluoride (GEL-AMBAR) 0.4 % GEL Discontinued by another Health Care Provider     Medications started since last [facility] medication reconciliation:  Orders Placed This Encounter   Medications     melatonin 5 MG tablet     Sig: Take 5 mg by mouth At Bedtime       Case Management:  I have reviewed the care plan and MDS and do agree with the plan.   Information reviewed:  Medications, vital signs, orders, and nursing notes.    ROS:  Unable to be obtained due to cognitive impairment or aphasia.     PHYSICAL EXAM:  /67  Pulse 74  Temp 99.4  F (37.4  C)  Resp 18  Ht 5' 2\" (1.575 m)  Wt 126 lb 6.4 oz (57.3 kg)  SpO2 95%  BMI 23.12 kg/m2  Gen: sitting in recliner, alert, cooperative and in no acute distress  Resp: breathing non-labored  GI: abdomen soft, not-tender  Ext: no " LE edema  Neuro: CX II-XII grossly in tact; ROM in all four extremities grossly in tact  Psych: memory, judgement and insight impaired    Lab/Diagnostic data:  Reviewed as per Epic    ASSESSMENT/PLAN    1) Bipolar Disorder   She has ongoing cyclical pattern to her moods, sometimes spending a couple days in bed. Today seen sitting in her recliner reading a book in very happy mood.   -- continues on lamotrigine 200 mg qhs, mirtazapine 30 mg qhs, quetiapine 150 mg qhs and sertraline 150 mg daily  -- supportive cares  -- appreciate cares of in-house psychiatry - ongoing management as per them    2) CKD, Stage IV   Baseline Cr 2.7-2.8 range. GFR around 15. Unclear etiology of her CKD - no DM nor HTN. No peripheral edema.   -- follow BMP q6 mos  -- avoid nephrotoxic meds    3) Slow Transit Constipation   -- managed with Miralax 17g daily and Senna-S 2 tabs qhs    Electronically signed by:  Felecia Elizabeth MD

## 2018-06-28 NOTE — PROGRESS NOTES
[facility]  Nursing Home Regulatory Visit  June 28, 2018    Chief Complaint   Patient presents with     long-term Regulatory       HPI:    Maribel Sevilla is a 86 year old  (12/20/1931), who is being seen today for a federally mandated E/M visit at [facility]. Today's concerns are:    1) Bipolar Disorder -- She has ongoing cyclical pattern to her moods, sometimes spending a couple days in bed. Today seen sitting in her recliner reading a book in very happy mood. Follows with in-house psychiatry and managed with lamotrigine 200 mg qhs, mirtazapine 30 mg qhs, quetiapine 150 mg qhs and sertraline 150 mg daily  2) CKD, Stage IV -- baseline Cr 2.7-2.8 range. GFR around 15. Unclear etiology of her CKD - no DM nor HTN. No peripheral edema.   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 Mother      ?     C.A.D. Father      ?     C.A.D. Sister      ?     C.A.D. Brother      ?     Psychotic Disorder Son      suicide       SOCIAL HISTORY:   Lives in a SNF    MEDICATIONS:  Current Outpatient Prescriptions   Medication Sig Dispense Refill     acetaminophen (TYLENOL) 500 MG tablet Take 500 mg by mouth 2 times daily and 1 tab every 4 hours as needed       donepezil (ARICEPT) 5 MG tablet Take 5 mg by mouth 2 times daily  30 tablet 0     lamoTRIgine (LAMICTAL) 150 MG tablet  "Take 200 mg by mouth At Bedtime        melatonin 5 MG tablet Take 5 mg by mouth At Bedtime       mirtazapine (REMERON) 15 MG tablet Take 30 mg by mouth At Bedtime        Multiple Vitamins-Minerals (OCUVITE ADULT FORMULA) CAPS Take 1 tablet by mouth daily        ondansetron (ZOFRAN) 4 MG tablet Take 4 mg by mouth 4 times daily        polyethylene glycol (MIRALAX/GLYCOLAX) packet Take 17 g by mouth daily. 28 each 1     QUEtiapine (SEROQUEL) 50 MG tablet Take 150 mg by mouth At Bedtime        ranitidine (RANITIDINE) 150 MG tablet Take 150 mg by mouth daily       senna-docusate (SENNA S) 8.6-50 MG per tablet Take 2 tablets by mouth At Bedtime       sertraline (ZOLOFT) 50 MG tablet Take 150 mg by mouth daily          Medications reviewed:  Medications reconciled to facility chart and changes were made to reflect current medications as identified as above med list. Below are the changes that were made:   Medications stopped since last EPIC medication reconciliation:   Medications Discontinued During This Encounter   Medication Reason     Stannous Fluoride (GEL-AMBAR) 0.4 % GEL Discontinued by another Health Care Provider     Medications started since last [facility] medication reconciliation:  Orders Placed This Encounter   Medications     melatonin 5 MG tablet     Sig: Take 5 mg by mouth At Bedtime       Case Management:  I have reviewed the care plan and MDS and do agree with the plan.   Information reviewed:  Medications, vital signs, orders, and nursing notes.    ROS:  Unable to be obtained due to cognitive impairment or aphasia.     PHYSICAL EXAM:  /67  Pulse 74  Temp 99.4  F (37.4  C)  Resp 18  Ht 5' 2\" (1.575 m)  Wt 126 lb 6.4 oz (57.3 kg)  SpO2 95%  BMI 23.12 kg/m2  Gen: sitting in recliner, alert, cooperative and in no acute distress  Resp: breathing non-labored  GI: abdomen soft, not-tender  Ext: no LE edema  Neuro: CX II-XII grossly in tact; ROM in all four extremities grossly in tact  Psych: memory, " judgement and insight impaired    Lab/Diagnostic data:  Reviewed as per Epic    ASSESSMENT/PLAN    1) Bipolar Disorder   She has ongoing cyclical pattern to her moods, sometimes spending a couple days in bed. Today seen sitting in her recliner reading a book in very happy mood.   -- continues on lamotrigine 200 mg qhs, mirtazapine 30 mg qhs, quetiapine 150 mg qhs and sertraline 150 mg daily  -- supportive cares  -- appreciate cares of in-house psychiatry - ongoing management as per them    2) CKD, Stage IV   Baseline Cr 2.7-2.8 range. GFR around 15. Unclear etiology of her CKD - no DM nor HTN. No peripheral edema.   -- follow BMP q6 mos  -- avoid nephrotoxic meds    3) Slow Transit Constipation   -- managed with Miralax 17g daily and Senna-S 2 tabs qhs    Electronically signed by:  Felecia Elizabeth MD

## 2018-07-25 VITALS
TEMPERATURE: 98.1 F | HEART RATE: 78 BPM | HEIGHT: 62 IN | RESPIRATION RATE: 18 BRPM | OXYGEN SATURATION: 92 % | WEIGHT: 123.7 LBS | DIASTOLIC BLOOD PRESSURE: 72 MMHG | BODY MASS INDEX: 22.76 KG/M2 | SYSTOLIC BLOOD PRESSURE: 121 MMHG

## 2018-07-26 ENCOUNTER — NURSING HOME VISIT (OUTPATIENT)
Dept: GERIATRICS | Facility: CLINIC | Age: 83
End: 2018-07-26
Payer: COMMERCIAL

## 2018-07-26 DIAGNOSIS — F51.04 PSYCHOPHYSIOLOGICAL INSOMNIA: ICD-10-CM

## 2018-07-26 DIAGNOSIS — D53.9 DEFICIENCY ANEMIA: ICD-10-CM

## 2018-07-26 DIAGNOSIS — M17.0 PRIMARY OSTEOARTHRITIS OF BOTH KNEES: ICD-10-CM

## 2018-07-26 DIAGNOSIS — K21.9 GASTROESOPHAGEAL REFLUX DISEASE WITHOUT ESOPHAGITIS: ICD-10-CM

## 2018-07-26 DIAGNOSIS — R11.15 NON-INTRACTABLE CYCLICAL VOMITING WITHOUT NAUSEA: ICD-10-CM

## 2018-07-26 DIAGNOSIS — F41.1 ANXIETY STATE: ICD-10-CM

## 2018-07-26 DIAGNOSIS — N18.4 CKD (CHRONIC KIDNEY DISEASE) STAGE 4, GFR 15-29 ML/MIN (H): ICD-10-CM

## 2018-07-26 DIAGNOSIS — R63.4 WEIGHT LOSS: ICD-10-CM

## 2018-07-26 DIAGNOSIS — F02.80 LEWY BODY DEMENTIA WITHOUT BEHAVIORAL DISTURBANCE (H): Primary | ICD-10-CM

## 2018-07-26 DIAGNOSIS — R39.15 URINARY URGENCY: ICD-10-CM

## 2018-07-26 DIAGNOSIS — F31.4 BIPOLAR DISORDER, CURRENT EPISODE DEPRESSED, SEVERE, WITHOUT PSYCHOTIC FEATURES (H): ICD-10-CM

## 2018-07-26 DIAGNOSIS — K59.1 FUNCTIONAL DIARRHEA: ICD-10-CM

## 2018-07-26 DIAGNOSIS — K59.01 CONSTIPATION, SLOW TRANSIT: ICD-10-CM

## 2018-07-26 DIAGNOSIS — Z13.6 CARDIOVASCULAR SCREENING; LDL GOAL LESS THAN 160: ICD-10-CM

## 2018-07-26 DIAGNOSIS — G31.83 LEWY BODY DEMENTIA WITHOUT BEHAVIORAL DISTURBANCE (H): Primary | ICD-10-CM

## 2018-07-26 DIAGNOSIS — F33.2 SEVERE EPISODE OF RECURRENT MAJOR DEPRESSIVE DISORDER, WITHOUT PSYCHOTIC FEATURES (H): ICD-10-CM

## 2018-07-26 DIAGNOSIS — H61.23 BILATERAL IMPACTED CERUMEN: ICD-10-CM

## 2018-07-26 PROCEDURE — 99318 ZZC ANNUAL NURSING FAC ASSESSMNT, STABLE: CPT | Performed by: NURSE PRACTITIONER

## 2018-07-26 NOTE — PROGRESS NOTES
[facility]  Chief Complaint   Patient presents with     Annual Comprehensive Nursing Home       [facility] Medical Record Number:  [id number]    HPI:    Maribel Sevilla is a 86 year old  (12/20/1931), who is being seen today for an annual comprehensive visit at [facility].  HPI information obtained from: facility chart records, facility staff and patient report.  Today's concerns are:  Lewy body dementia without behavioral disturbance  Chronic; progressive; continues to require 24-hr supportive cares  Resident resides on LTC unit  Is appropriate with conversation and does most ADLs independently with some prompting/direction  Continues on regimen of Aricept  BIMs: 15/15    Bilateral impacted cerumen  Hx of recurrence of impacted cerumen. Started on regimen of mineral oil gtts to ears at bedtime as she allows to prevent buildup. No recent concerns/complaint of decreased hearing.    Gastroesophageal reflux disease without esophagitis  Non-intractable cyclical vomiting without nausea  Chronic  Resident has periods of GI complaints that include GERD and vomiting up of sputum; does have PRN Zofran available.     Constipation, slow transit  Functional diarrhea  Varying degrees  No bowel complaints today  Continues on regimen of Senna-S    Primary osteoarthritis of both knees  Chronic  Occasional noted pains - denies pain today  Controlled on regimen of Tylenol    CKD (chronic kidney disease) stage 4, GFR 15-29 ml/min (H)  Ongoing   Ref. Range 1/23/2017 00:00 2/6/2017 00:00 8/2/2017 00:00   Urea Nitrogen Latest Ref Range: 9 - 26 mg/dL 22 31 (A) 30 (H)   Creatinine Latest Ref Range: 0.55 - 1.02 mg/dL 2.25 (H) 2.77 (A) 2.80 (H)   GFR Estimate Latest Ref Range: >60 ml/min/1.73m2 19 (L) 15 (A) 15 (L)   GFR Estimate If Black Latest Ref Range: >60 ml/min/1.73m2 22 (L) 17 (A) 17 (L)     Urinary urgency  Chronic  Resident notes that she does have issues making it to the BR occasionally 2/2  "urgency. Wears briefs daily.    Deficiency anemia   Ref. Range 8/5/2016 00:00 1/18/2017 00:00 8/2/2017 00:00   Hemoglobin Latest Ref Range: 11.8 - 15.5 g/dL 12.1 9.8 (A) 8.2 (L)   No replacement regimen at this time    Severe episode of recurrent major depressive disorder, without psychotic features (H)  Bipolar disorder, current episode depressed, severe, without psychotic features (H)  Managed by Psych; has varying days - some days she is up out of bed on the units participating in activities, some she is up reading in her room, and others she prefers to spend in bed in her pajamas.   Managed on regimen of Lamictal, Remeron, Seroquel and Zoloft    CARDIOVASCULAR SCREENING; LDL GOAL LESS THAN 160  Annual screening    Anxiety state  No recent episodes on anxiety  BPD tx regimen as noted above    Psychophysiological insomnia  2/2 above noted dx  Remeron as noted above  She denies issues with sleeping at night    Weight loss  Wt Readings from Last 9 Encounters:   07/25/18 123 lb 11.2 oz (56.1 kg)   06/28/18 126 lb 6.4 oz (57.3 kg)   02/25/18 125 lb (56.7 kg)   12/14/17 123 lb 14.4 oz (56.2 kg)   10/01/17 130 lb (59 kg)   09/26/17 125 lb (56.7 kg)   08/03/17 130 lb (59 kg)   08/01/17 129 lb (58.5 kg)   06/07/17 132 lb (59.9 kg)   Gradual weight loss over the past year  Estimated body mass index is 22.63 kg/(m^2) as calculated from the following:    Height as of this encounter: 5' 2\" (1.575 m).    Weight as of this encounter: 123 lb 11.2 oz (56.1 kg).  BMI WNL  In-house dietician monitoring       Based on JNC-8 goals,  patients age of 86 year old, no presence of diabetes or CKD, and goals of care goal BP is  <140/90 mm Hg. Patient is stable and continue without pharmacological invention with routine assessment..    ALLERGIES: No known allergies  PROBLEM LIST:  Patient Active Problem List   Diagnosis     Lewy body dementia     Major depressive disorder, recurrent episode (H)     Bipolar disorder (H)     CARDIOVASCULAR " SCREENING; LDL GOAL LESS THAN 160     Advanced directives, counseling/discussion     Anxiety state     Abnormality of gait     Deficiency anemia     Esophageal reflux     Insomnia     Constipation, slow transit     OA (osteoarthritis) of knee     Diarrhea     Bilateral impacted cerumen     Non-intractable cyclical vomiting without nausea     Weight loss     CKD (chronic kidney disease) stage 4, GFR 15-29 ml/min (H)     Urinary urgency     Dental caries     PAST MEDICAL HISTORY:  has a past medical history of Anemia; Anxiety; Bipolar affective (H); Dementia; Depressive disorder; Gastro-oesophageal reflux disease; OA (osteoarthritis) of knee; and Renal insufficiencies, chronic.  PAST SURGICAL HISTORY:  has a past surgical history that includes Salpingo-oophorectomy bilateral; Hysterectomy; and Eye surgery.  FAMILY HISTORY: family history includes C.A.D. in her brother, father, mother, and sister; Psychotic Disorder in her son.  SOCIAL HISTORY:  reports that she has never smoked. She has never used smokeless tobacco. She reports that she does not drink alcohol or use illicit drugs.  IMMUNIZATIONS:  Most Recent Immunizations   Administered Date(s) Administered     Influenza (IIV3) PF 10/02/2017     Mantoux Tuberculin Skin Test 08/20/2011     Pneumo Conj 13-V (2010&after) 10/05/2015     Pneumococcal 23 valent 10/23/2013     TD (ADULT, 7+) 03/15/2017     Above immunizations pulled from [facility]. [facility] and facility records also reconciled. Outstanding information sent to  to update [facility].  Future immunizations needed:  yearly influenza per facility protocol  MEDICATIONS:  Current Outpatient Prescriptions   Medication Sig Dispense Refill     acetaminophen (TYLENOL) 500 MG tablet Take 500 mg by mouth 2 times daily and 1 tab every 4 hours as needed       donepezil (ARICEPT) 5 MG tablet Take 5 mg by mouth 2 times daily  30 tablet 0     lamoTRIgine (LAMICTAL) 150 MG tablet Take 200 mg by mouth At  Bedtime        melatonin 5 MG tablet Take 5 mg by mouth At Bedtime       mirtazapine (REMERON) 15 MG tablet Take 30 mg by mouth At Bedtime        Multiple Vitamins-Minerals (OCUVITE ADULT FORMULA) CAPS Take 1 tablet by mouth daily        ondansetron (ZOFRAN) 4 MG tablet Take 4 mg by mouth 4 times daily        QUEtiapine (SEROQUEL) 50 MG tablet Take 150 mg by mouth At Bedtime        senna-docusate (SENNA S) 8.6-50 MG per tablet Take 2 tablets by mouth At Bedtime       sertraline (ZOLOFT) 50 MG tablet Take 150 mg by mouth daily        ferrous sulfate (IRON) 325 (65 Fe) MG tablet Take 325 mg by mouth daily every other day       pantoprazole (PROTONIX) 40 MG EC tablet Take 40 mg by mouth 2 times daily       polyethylene glycol 3350 POWD Take 17 g by mouth daily as needed       Medications reviewed:  Medications reconciled to facility chart and changes were made to reflect current medications as identified as above med list. Below are the changes that were made:   Medications stopped since last EPIC medication reconciliation:   There are no discontinued medications.    Medications started since last [facility] medication reconciliation:  No orders of the defined types were placed in this encounter.    Case Management:  I have reviewed the facility/SNF care plan/MDS which was done 5/2/18, including the falls risk, nutrition and pain screening. I also reviewed the current immunizations, and preventive care..Future cancer screening is not clinically indicated secondary to age/goals of care Patient's desire to return to the community is present, but is not able due to care needs . Current Level of Care is appropriate.    Advance Directive Discussion:    I reviewed the current advanced directives as reflected in EPIC, the POLST and the facility chart, and verified the congruency of orders. I contacted the first party, daughter, Mari and discussed the plan of Care.  I did review the advance directives with the resident.     Team  "Discussion:  I communicated with the appropriate disciplines involved with the Plan of Care:   Nursing      Patient Goal:  Patient's goal is to help her feel more happy and get essential oils for her diffuser.    Information reviewed:  Medications, vital signs, orders, and nursing notes.    ROS:  10 point ROS of systems including Constitutional, Eyes, Respiratory, Cardiovascular, Gastroenterology, Genitourinary, Integumentary, Muscularskeletal, Psychiatric were all negative except for pertinent positives noted in my HPI.    Exam:  /72  Pulse 78  Temp 98.1  F (36.7  C)  Resp 18  Ht 5' 2\" (1.575 m)  Wt 123 lb 11.2 oz (56.1 kg)  SpO2 92%  BMI 22.63 kg/m2  GENERAL APPEARANCE:  Alert, in no distress, oriented, cooperative elderly woman resting in bed during the middle of the day in her [facility]  ENT:  Mouth and posterior oropharynx normal, moist mucous membranes, [place]  EYES:  EOM, conjunctivae, lids, pupils and irises normal  NECK:  No adenopathy, masses or thyromegaly  RESP:  Respiratory effort and palpation of chest normal, lungs clear to auscultation, no respiratory distress  CV:  Palpation and auscultation of heart done, regular rate and rhythm, no murmur, rub, or gallop, no edema, +2 pedal pulses  ABDOMEN: Active bowel sounds x4, soft, nontender, no hepatosplenomegaly or other masses  M/S:   Gait and station normal; Digits and nails abnormal - arthritic changes present  NEURO:   Cranial nerves 2-12 are normal tested and grossly at patient's baseline  PSYCH:  oriented X 3, normal insight, judgement and memory, affect and mood Pleasant; tired.    BP Readings from Last 3 Encounters:   07/31/18 122/66   07/25/18 121/72   06/28/18 104/67     Pulse Readings from Last 4 Encounters:   07/31/18 68   07/25/18 78   06/28/18 74   04/18/18 75     Lab/Diagnostic data:   CBC RESULTS:   Recent Labs   Lab Test 08/02/17 01/18/17   09/02/15   1933   07/19/12   1225   WBC  4.9  5.1   < >  7.7   < >  5.6   RBC  2.87*  3.17*   " < >  3.67*   < >  3.91   HGB  8.2*  9.8*   < >  11.5*   < >  12.2   HCT  26.8*  31.0*   < >  35.3   < >  36.6   MCV  93.4  97.8   < >  96   < >  94   MCH   --    --    --   31.3   --   31.2   MCHC   --    --    --   32.6   --   33.3   RDW  13.9  12.9   < >  12.7   < >  12.2   PLT  226  215   < >  249   < >  173    < > = values in this interval not displayed.       Last Basic Metabolic Panel:  Recent Labs   Lab Test 08/02/17 02/06/17   NA  142  144   POTASSIUM  4.0  4.4   CHLORIDE  110*  109   RILEY  9.5  9.7   CO2  25  25   BUN  30*  31*   CR  2.80*  2.77*   GLC  138*  111*       Liver Function Studies -   Recent Labs   Lab Test 08/02/17 09/08/11   0705   PROTTOTAL  6.2*  6.2*   ALBUMIN  3.0*  3.5   BILITOTAL  0.2  0.5   ALKPHOS  81  86   AST  27  33   ALT  13  25       TSH   Date Value Ref Range Status   09/09/2011 2.14 0.4 - 5.0 mU/L Final         ASSESSMENT/PLAN  Lewy body dementia without behavioral disturbance  Stable on current regimen; continue medications as currently ordered.  Continue with current POC as it remains appropriate for the resident    Bilateral impacted cerumen  Stable on current regimen; continue medications as currently ordered.    Gastroesophageal reflux disease without esophagitis  Non-intractable cyclical vomiting without nausea  Stable on current regimen; continue medications as currently ordered.  Spoke with staff about attaining peppermint essential oils for the resident's diffuser to be used on days when she has c/o stomach upset    Constipation, slow transit  Functional diarrhea  Stable on current regimen; continue medications as currently ordered.    Primary osteoarthritis of both knees  Stable on current regimen; continue medications as currently ordered.    CKD (chronic kidney disease) stage 4, GFR 15-29 ml/min (H)  BMP    Urinary urgency  Continue to offer/remind resident to use BR q2h throughout waking hours    Deficiency anemia  CBC    Severe episode of recurrent major depressive  disorder, without psychotic features (H)  Bipolar disorder, current episode depressed, severe, without psychotic features (H)  Continue management per in-house psych; appreciate their care of Maribel  Stable on current regimen; continue medications as currently ordered.  LFTs and Lamictal level    CARDIOVASCULAR SCREENING; LDL GOAL LESS THAN 160  Fasting lipid panel    Anxiety state  No recent concerns  Continue BPD tx regimen as ordered    Psychophysiological insomnia  Stable on current regimen; continue medications as currently ordered.    Weight loss  Has been remaining relatively stable  Continue current interventions per dietary recommendations    Electronically signed by:  ASCENCION Rm CNP

## 2018-07-27 ENCOUNTER — TRANSFERRED RECORDS (OUTPATIENT)
Dept: HEALTH INFORMATION MANAGEMENT | Facility: CLINIC | Age: 83
End: 2018-07-27

## 2018-07-27 LAB
ALBUMIN SERPL-MCNC: 3.4 G/DL (ref 3.4–5)
ALP SERPL-CCNC: 87 U/L (ref 40–150)
ALT SERPL-CCNC: <10 U/L (ref 9–55)
AST SERPL-CCNC: 16 U/L (ref 10–40)
BILIRUB SERPL-MCNC: 0.2 MG/DL (ref 0.2–1.2)
BUN SERPL-MCNC: 32 MG/DL (ref 9–26)
CALCIUM SERPL-MCNC: 9.5 MG/DL (ref 8.4–10.4)
CHLORIDE SERPLBLD-SCNC: 110 MMOL/L (ref 98–109)
CO2 SERPL-SCNC: 24 MMOL/L (ref 22–31)
CREAT SERPL-MCNC: 2.5 MG/DL (ref 0.55–1.02)
DIFFERENTIAL: ABNORMAL
ERYTHROCYTE [DISTWIDTH] IN BLOOD BY AUTOMATED COUNT: 16.2 % (ref 11–15)
GFR SERPL CREATININE-BSD FRML MDRD: 17 ML/MIN/1.73M2
GLUCOSE SERPL-MCNC: 80 MG/DL (ref 70–99)
HCT VFR BLD AUTO: 23.9 % (ref 35–46)
HEMOGLOBIN: 6.9 G/DL (ref 11.8–15.5)
MCV RBC AUTO: 83.9 FL (ref 80–100)
PLATELET # BLD AUTO: 258 K/CMM (ref 140–450)
POTASSIUM SERPL-SCNC: 4.2 MMOL/L (ref 3.5–5.2)
PROT SERPL-MCNC: 6.6 G/DL (ref 6.4–8.3)
RBC # BLD AUTO: 2.86 M/CMM (ref 3.7–5.2)
SODIUM SERPL-SCNC: 143 MMOL/L (ref 136–145)
WBC # BLD AUTO: 5.4 K/CMM (ref 3.8–11)

## 2018-07-29 ENCOUNTER — TRANSFERRED RECORDS (OUTPATIENT)
Dept: HEALTH INFORMATION MANAGEMENT | Facility: CLINIC | Age: 83
End: 2018-07-29

## 2018-07-30 LAB
CREAT SERPL-MCNC: 2.04 MG/DL (ref 0.57–1.11)
GFR SERPL CREATININE-BSD FRML MDRD: 23 ML/MIN/1.73M2
GLUCOSE SERPL-MCNC: 85 MG/DL (ref 65–100)
POTASSIUM SERPL-SCNC: 4.7 MMOL/L (ref 3.5–5)

## 2018-07-31 VITALS
DIASTOLIC BLOOD PRESSURE: 66 MMHG | BODY MASS INDEX: 22.76 KG/M2 | OXYGEN SATURATION: 95 % | HEART RATE: 68 BPM | RESPIRATION RATE: 16 BRPM | WEIGHT: 123.7 LBS | HEIGHT: 62 IN | SYSTOLIC BLOOD PRESSURE: 122 MMHG | TEMPERATURE: 98.5 F

## 2018-07-31 RX ORDER — FERROUS SULFATE 325(65) MG
325 TABLET ORAL DAILY
COMMUNITY
End: 2021-05-18

## 2018-07-31 RX ORDER — PANTOPRAZOLE SODIUM 40 MG/1
40 TABLET, DELAYED RELEASE ORAL DAILY
COMMUNITY

## 2018-07-31 RX ORDER — POLYETHYLENE GLYCOL 3350
17 POWDER (GRAM) MISCELLANEOUS DAILY PRN
COMMUNITY
End: 2019-07-22

## 2018-07-31 NOTE — PROGRESS NOTES
[facility]  PRIMARY CARE PROVIDER AND CLINIC:  Inez, Bri Mahajan [address] / [place] [address]  Chief Complaint   Patient presents with     Hospital F/U     [facility] Medical Record Number:  [id number]    HPI:    Maribel Sevilla is a 86 year old  (12/20/1931),admitted to the [facility] from [facility] .  Hospital stay 7/29/2018 through 7/30/2018.  Admitted to this facility for  rehab, medical management and nursing care.  HPI information obtained from: facility chart records, facility staff and patient report.      BRIEF HOSPITAL COURSE:  Maribel Sevilla is a 8G y.o. female with a history of depression and YUE, who was admitted to [facility] on 7/29/2018 after presenting to the ED for evaluation of syncopal episode 2 days prior to admission, the nursing home patient resides at [facility] and noted Hgb to be 6.9. Patient was offered transfusion or Fe supplements and she chose the latter. Stool noted to be guaiac positive. On day of admission, patient was on the [employer] bus when she had a syncopal episode witnessed by .  was able to ease her down, patient did not fall or obtain any injuries.  While in the ED, VSS. Labs remarkable for creatinine 2.49 and Hgb 6.7. Troponin negative and lactate normal.  CXR remarkable for left basilar atelectasis. A/P CT showed no acute findings. EKG unremarkable. Given IV Protonix and IVF.  On admission, patient received 1 unit pRBC's with improvement of Hgb to 8.1. G1 consulted. She underwent EGD 7/30/2018 which showed benign-appearing esophageal stenosis. (Schatzki's ring) which was dilated with the endoscope. This could be dilated further if symptomatic dysphagia develops. Also found focally erosive, chronic appearing duodenitis which is the likely source of her anemia. Protonix 40 mg twice daily for 8 weeks was recommended with decrease to once daily thereafter. She should continue on iron,  ferrous sulfate 325 mg every other day with a hemoglobin recheck in the next week or so.  Patient clinically improved and deemed stable for discharge on 7/30/2018 to her nursing home. She is prescribed a BID for two weeks then transition to daily use. She is to follow-up with her PCP in the next 1-5 days.    Current issues are:      Anemia due to blood loss, acute  Acute upper GI bleed  Gastroesophageal reflux disease without esophagitis  Lewy body dementia without behavioral disturbance     Since readmission patient denies nausea or vomiting, no abdominal pain. Hgb recheck pending today. Continues PPI, GI f/u PRN  Lab Results   Component Value Date    HGB 6.9 07/27/2018    HGB 8.2 08/02/2017          CODE STATUS/ADVANCE DIRECTIVES DISCUSSION:   CPR/Full code   Patient's living condition: lives in a residential care facility    ALLERGIES:No known allergies  PAST MEDICAL HISTORY:  has a past medical history of Anemia; Anxiety; Bipolar affective (H); Dementia; Depressive disorder; Gastro-oesophageal reflux disease; OA (osteoarthritis) of knee; and Renal insufficiencies, chronic.  PAST SURGICAL HISTORY:  has a past surgical history that includes Salpingo-oophorectomy bilateral; Hysterectomy; and Eye surgery.  FAMILY HISTORY: family history includes C.A.D. in her brother, father, mother, and sister; Psychotic Disorder in her son.  SOCIAL HISTORY:  reports that she has never smoked. She has never used smokeless tobacco. She reports that she does not drink alcohol or use illicit drugs.    Post Discharge Medication Reconciliation Status: discharge medications reconciled, continue medications without change.  Current Outpatient Prescriptions   Medication Sig Dispense Refill     acetaminophen (TYLENOL) 500 MG tablet Take 500 mg by mouth 2 times daily and 1 tab every 4 hours as needed       donepezil (ARICEPT) 5 MG tablet Take 5 mg by mouth 2 times daily  30 tablet 0     ferrous sulfate (IRON) 325 (65 Fe) MG tablet Take 325  "mg by mouth daily every other day       lamoTRIgine (LAMICTAL) 150 MG tablet Take 200 mg by mouth At Bedtime        melatonin 5 MG tablet Take 5 mg by mouth At Bedtime       mirtazapine (REMERON) 15 MG tablet Take 30 mg by mouth At Bedtime        Multiple Vitamins-Minerals (OCUVITE ADULT FORMULA) CAPS Take 1 tablet by mouth daily        ondansetron (ZOFRAN) 4 MG tablet Take 4 mg by mouth 4 times daily        pantoprazole (PROTONIX) 40 MG EC tablet Take 40 mg by mouth 2 times daily       polyethylene glycol 3350 POWD Take 17 g by mouth daily as needed       QUEtiapine (SEROQUEL) 50 MG tablet Take 150 mg by mouth At Bedtime        senna-docusate (SENNA S) 8.6-50 MG per tablet Take 2 tablets by mouth At Bedtime       sertraline (ZOLOFT) 50 MG tablet Take 150 mg by mouth daily          ROS:  4 point ROS including Respiratory, CV, GI and , other than that noted in the HPI,  is negative    Exam:  /66  Pulse 68  Temp 98.5  F (36.9  C)  Resp 16  Ht 5' 2\" (1.575 m)  Wt 123 lb 11.2 oz (56.1 kg)  SpO2 95%  BMI 22.63 kg/m2  GENERAL APPEARANCE:  Alert, in no distress, pleasant, cooperative, oriented x self, place and recent events  EYES:  EOM, lids, pupils and irises normal, sclera clear and conjunctiva normal, no discharge or mattering on lids or lashes noted  ENT:  Mouth normal, moist mucous membranes, nose normal without drainage or crusting, external ears without lesions, hearing acuity intact  NECK: supple, symmetrical  RESP:  respiratory effort and palpation of chest normal, no chest wall tenderness, no respiratory distress, Lung sounds clear, patient is on room air  CV:  Palpation and auscultation of heart done, rate and rhythm controlled, no murmur, no rub or gallop. Edema none bilateral lower extremities.   ABDOMEN:  normal bowel sounds, soft, nontender.  M/S:   Gait and station did not assess resting in bed, no tenderness or swelling of the joints; able to move all extremities  SKIN:  Inspection and " palpation of skin and subcutaneous tissue: skin on extremities warm, dry and intact without rashes  NEURO: cranial nerves 2-12 grossly intact, no facial asymmetry, no speech deficits and able to follow directions, moves all extremities symmetrically  PSYCH:  insight and judgement impaired, memory forgetful, affect and mood normal     BP Readings from Last 3 Encounters:   07/31/18 122/66   07/25/18 121/72   06/28/18 104/67     Pulse Readings from Last 4 Encounters:   07/31/18 68   07/25/18 78   06/28/18 74   04/18/18 75     Wt Readings from Last 3 Encounters:   07/31/18 123 lb 11.2 oz (56.1 kg)   07/25/18 123 lb 11.2 oz (56.1 kg)   06/28/18 126 lb 6.4 oz (57.3 kg)     Lab/Diagnostic data:  CBC RESULTS:   Recent Labs   Lab Test 07/27/18 08/02/17   09/02/15   1933   07/19/12   1225   WBC  5.4  4.9   < >  7.7   < >  5.6   RBC  2.86*  2.87*   < >  3.67*   < >  3.91   HGB  6.9*  8.2*   < >  11.5*   < >  12.2   HCT  23.9*  26.8*   < >  35.3   < >  36.6   MCV  83.9  93.4   < >  96   < >  94   MCH   --    --    --   31.3   --   31.2   MCHC   --    --    --   32.6   --   33.3   RDW  16.2*  13.9   < >  12.7   < >  12.2   PLT  258  226   < >  249   < >  173    < > = values in this interval not displayed.       Last Basic Metabolic Panel:  Recent Labs   Lab Test 07/27/18 08/02/17   NA  143  142   POTASSIUM  4.2  4.0   CHLORIDE  110*  110*   RILEY  9.5  9.5   CO2  24  25   BUN  32*  30*   CR  2.50*  2.80*   GLC  80  138*       Liver Function Studies -   Recent Labs   Lab Test 07/27/18 08/02/17   PROTTOTAL  6.6  6.2*   ALBUMIN  3.4  3.0*   BILITOTAL  0.2  0.2   ALKPHOS  87  81   AST  16  27   ALT  <10  13       TSH   Date Value Ref Range Status   09/09/2011 2.14 0.4 - 5.0 mU/L Final         ASSESSMENT/PLAN:  1. Anemia due to blood loss, acute    2. Acute upper GI bleed    3. Gastroesophageal reflux disease without esophagitis    4. Lewy body dementia without behavioral disturbance      Acute on chronic issues. Appears stable, some  dark stools but no s/s active bleeding.     Orders:  Staff to report today's Hgb results to NP  Check Hgb again on 8/3/18 diagnosis anemia.   PPI as ordered and GI f/u PRN    Electronically signed by:  ASCENCION Rodríguez CNP

## 2018-08-01 ENCOUNTER — NURSING HOME VISIT (OUTPATIENT)
Dept: GERIATRICS | Facility: CLINIC | Age: 83
End: 2018-08-01
Payer: COMMERCIAL

## 2018-08-01 ENCOUNTER — TRANSFERRED RECORDS (OUTPATIENT)
Dept: HEALTH INFORMATION MANAGEMENT | Facility: CLINIC | Age: 83
End: 2018-08-01

## 2018-08-01 DIAGNOSIS — D62 ANEMIA DUE TO BLOOD LOSS, ACUTE: Primary | ICD-10-CM

## 2018-08-01 DIAGNOSIS — K21.9 GASTROESOPHAGEAL REFLUX DISEASE WITHOUT ESOPHAGITIS: ICD-10-CM

## 2018-08-01 DIAGNOSIS — K92.2 ACUTE UPPER GI BLEED: ICD-10-CM

## 2018-08-01 DIAGNOSIS — F02.80 LEWY BODY DEMENTIA WITHOUT BEHAVIORAL DISTURBANCE (H): ICD-10-CM

## 2018-08-01 DIAGNOSIS — G31.83 LEWY BODY DEMENTIA WITHOUT BEHAVIORAL DISTURBANCE (H): ICD-10-CM

## 2018-08-01 LAB — HEMOGLOBIN: 8.3 G/DL (ref 11.8–15.5)

## 2018-08-01 PROCEDURE — 99309 SBSQ NF CARE MODERATE MDM 30: CPT | Performed by: NURSE PRACTITIONER

## 2018-08-01 NOTE — MR AVS SNAPSHOT
"              After Visit Summary   2018    Maribel Sevilla    MRN: [id number]           Patient Information     Date Of Birth          1931        Visit Information        Provider Department      2018 3:30 PM Laura Jimenez APRN CNP [facility]        Today's Diagnoses     Anemia due to blood loss, acute    -  1    Acute upper GI bleed        Gastroesophageal reflux disease without esophagitis        Lewy body dementia without behavioral disturbance           Follow-ups after your visit        Who to contact     If you have questions or need follow up information about today's clinic visit or your schedule please contact [facility] directly at [phone].  Normal or non-critical lab and imaging results will be communicated to you by Reapplixhart, letter or phone within 4 business days after the clinic has received the results. If you do not hear from us within 7 days, please contact the clinic through Reapplixhart or phone. If you have a critical or abnormal lab result, we will notify you by phone as soon as possible.  Submit refill requests through [employer] or call your pharmacy and they will forward the refill request to us. Please allow 3 business days for your refill to be completed.          Additional Information About Your Visit        MyChart Information     [employer] lets you send messages to your doctor, view your test results, renew your prescriptions, schedule appointments and more. To sign up, go to [email or web address] . Click on \"Log in\" on the left side of the screen, which will take you to the Welcome page. Then click on \"Sign up Now\" on the right side of the page.     You will be asked to enter the access code listed below, as well as some personal information. Please follow the directions to create your username and password.     Your access code is: [email or web address]  Expires: 2018 11:23 AM     Your access code will  in 90 days. If you need help " "or a new code, please call your [facility] clinic or [phone].        Care EveryWhere ID     This is your Care EveryWhere ID. This could be used by other organizations to access your [facility] medical records  [id number]        Your Vitals Were     Pulse Temperature Respirations Height Pulse Oximetry BMI (Body Mass Index)    68 98.5  F (36.9  C) 16 5' 2\" (1.575 m) 95% 22.63 kg/m2       Blood Pressure from Last 3 Encounters:   07/31/18 122/66   07/25/18 121/72   06/28/18 104/67    Weight from Last 3 Encounters:   07/31/18 123 lb 11.2 oz (56.1 kg)   07/25/18 123 lb 11.2 oz (56.1 kg)   06/28/18 126 lb 6.4 oz (57.3 kg)              Today, you had the following     No orders found for display         Today's Medication Changes          These changes are accurate as of 8/1/18 11:59 PM.  If you have any questions, ask your nurse or doctor.               Stop taking these medicines if you haven't already. Please contact your care team if you have questions.     polyethylene glycol Packet   Commonly known as:  MIRALAX/GLYCOLAX                    Primary Care Provider Office Phone # Fax #    Bri ASCENCION Chase [phone]       [address] [place] [address]        Equal Access to Services     GLORIA JIN : Hadpayton Rodriguez, paul lumercy, qaybta kaalnishant blandon . So [facility] [phone].    ATENCIÓN: Si habla español, tiene a gutiérrez disposición servicios gratuitos de asistencia lingüística. Cornelius al [phone].    We comply with applicable federal civil rights laws and [place] laws. We do not discriminate on the basis of race, color, national origin, age, disability, sex, sexual orientation, or gender identity.            Thank you!     Thank you for choosing [facility]  for your care. Our goal is always to provide you with excellent care. Hearing back from our patients is one way we can continue to improve our " services. Please take a few minutes to complete the written survey that you may receive in the mail after your visit with us. Thank you!             Your Updated Medication List - Protect others around you: Learn how to safely use, store and throw away your medicines at www.disposemymeds.org.          This list is accurate as of 8/1/18 11:59 PM.  Always use your most recent med list.                   Brand Name Dispense Instructions for use Diagnosis    acetaminophen 500 MG tablet    TYLENOL     Take 500 mg by mouth 2 times daily and 1 tab every 4 hours as needed        donepezil 5 MG tablet    ARIcept    30 tablet    Take 5 mg by mouth 2 times daily        ferrous sulfate 325 (65 Fe) MG tablet    IRON     Take 325 mg by mouth daily every other day        LAMICTAL 150 MG tablet   Generic drug:  lamoTRIgine      Take 200 mg by mouth At Bedtime        melatonin 5 MG tablet      Take 5 mg by mouth At Bedtime        OCUVITE ADULT FORMULA Caps      Take 1 tablet by mouth daily        pantoprazole 40 MG EC tablet    PROTONIX     Take 40 mg by mouth 2 times daily        polyethylene glycol 3350 Powd      Take 17 g by mouth daily as needed        QUEtiapine 50 MG tablet    SEROquel     Take 150 mg by mouth At Bedtime        REMERON 15 MG tablet   Generic drug:  mirtazapine      Take 30 mg by mouth At Bedtime        SENNA S 8.6-50 MG per tablet   Generic drug:  senna-docusate      Take 2 tablets by mouth At Bedtime        ZOFRAN 4 MG tablet   Generic drug:  ondansetron      Take 4 mg by mouth 4 times daily        ZOLOFT 50 MG tablet   Generic drug:  sertraline      Take 150 mg by mouth daily

## 2018-08-01 NOTE — LETTER
8/1/2018        RE: Maribel Sevilla  [facility]  [address] [facility] [address]        [facility]  PRIMARY CARE PROVIDER AND CLINIC:  Bri Wiley [address] [facility] [address] / [place] [address]  Chief Complaint   Patient presents with     Hospital F/U     [facility] Medical Record Number:  [id number]    HPI:    Maribel Sevilla is a 86 year old  (12/20/1931),admitted to the [facility] from [facility] .  Hospital stay 7/29/2018 through 7/30/2018.  Admitted to this facility for  rehab, medical management and nursing care.  HPI information obtained from: facility chart records, facility staff and patient report.      BRIEF HOSPITAL COURSE:  Maribel Sevilla is a 8G y.o. female with a history of depression and YUE, who was admitted to [facility] on 7/29/2018 after presenting to the ED for evaluation of syncopal episode 2 days prior to admission, the nursing home patient resides at [facility] and noted Hgb to be 6.9. Patient was offered transfusion or Fe supplements and she chose the latter. Stool noted to be guaiac positive. On day of admission, patient was on the [employer] bus when she had a syncopal episode witnessed by .  was able to ease her down, patient did not fall or obtain any injuries.  While in the ED, VSS. Labs remarkable for creatinine 2.49 and Hgb 6.7. Troponin negative and lactate normal.  CXR remarkable for left basilar atelectasis. A/P CT showed no acute findings. EKG unremarkable. Given IV Protonix and IVF.  On admission, patient received 1 unit pRBC's with improvement of Hgb to 8.1. G1 consulted. She underwent EGD 7/30/2018 which showed benign-appearing esophageal stenosis. (Schatzki's ring) which was dilated with the endoscope. This could be dilated further if symptomatic dysphagia develops. Also found focally erosive, chronic appearing duodenitis which is the likely source of her anemia. Protonix 40 mg  twice daily for 8 weeks was recommended with decrease to once daily thereafter. She should continue on iron, ferrous sulfate 325 mg every other day with a hemoglobin recheck in the next week or so.  Patient clinically improved and deemed stable for discharge on 7/30/2018 to her nursing home. She is prescribed a BID for two weeks then transition to daily use. She is to follow-up with her PCP in the next 1-5 days.    Current issues are:      Anemia due to blood loss, acute  Acute upper GI bleed  Gastroesophageal reflux disease without esophagitis  Lewy body dementia without behavioral disturbance     Since readmission patient denies nausea or vomiting, no abdominal pain. Hgb recheck pending today. Continues PPI, GI f/u PRN  Lab Results   Component Value Date    HGB 6.9 07/27/2018    HGB 8.2 08/02/2017          CODE STATUS/ADVANCE DIRECTIVES DISCUSSION:   CPR/Full code   Patient's living condition: lives in a residential care facility    ALLERGIES:No known allergies  PAST MEDICAL HISTORY:  has a past medical history of Anemia; Anxiety; Bipolar affective (H); Dementia; Depressive disorder; Gastro-oesophageal reflux disease; OA (osteoarthritis) of knee; and Renal insufficiencies, chronic.  PAST SURGICAL HISTORY:  has a past surgical history that includes Salpingo-oophorectomy bilateral; Hysterectomy; and Eye surgery.  FAMILY HISTORY: family history includes C.A.D. in her brother, father, mother, and sister; Psychotic Disorder in her son.  SOCIAL HISTORY:  reports that she has never smoked. She has never used smokeless tobacco. She reports that she does not drink alcohol or use illicit drugs.    Post Discharge Medication Reconciliation Status: discharge medications reconciled, continue medications without change.  Current Outpatient Prescriptions   Medication Sig Dispense Refill     acetaminophen (TYLENOL) 500 MG tablet Take 500 mg by mouth 2 times daily and 1 tab every 4 hours as needed       donepezil (ARICEPT) 5 MG  "tablet Take 5 mg by mouth 2 times daily  30 tablet 0     ferrous sulfate (IRON) 325 (65 Fe) MG tablet Take 325 mg by mouth daily every other day       lamoTRIgine (LAMICTAL) 150 MG tablet Take 200 mg by mouth At Bedtime        melatonin 5 MG tablet Take 5 mg by mouth At Bedtime       mirtazapine (REMERON) 15 MG tablet Take 30 mg by mouth At Bedtime        Multiple Vitamins-Minerals (OCUVITE ADULT FORMULA) CAPS Take 1 tablet by mouth daily        ondansetron (ZOFRAN) 4 MG tablet Take 4 mg by mouth 4 times daily        pantoprazole (PROTONIX) 40 MG EC tablet Take 40 mg by mouth 2 times daily       polyethylene glycol 3350 POWD Take 17 g by mouth daily as needed       QUEtiapine (SEROQUEL) 50 MG tablet Take 150 mg by mouth At Bedtime        senna-docusate (SENNA S) 8.6-50 MG per tablet Take 2 tablets by mouth At Bedtime       sertraline (ZOLOFT) 50 MG tablet Take 150 mg by mouth daily          ROS:  4 point ROS including Respiratory, CV, GI and , other than that noted in the HPI,  is negative    Exam:  /66  Pulse 68  Temp 98.5  F (36.9  C)  Resp 16  Ht 5' 2\" (1.575 m)  Wt 123 lb 11.2 oz (56.1 kg)  SpO2 95%  BMI 22.63 kg/m2  GENERAL APPEARANCE:  Alert, in no distress, pleasant, cooperative, oriented x self, place and recent events  EYES:  EOM, lids, pupils and irises normal, sclera clear and conjunctiva normal, no discharge or mattering on lids or lashes noted  ENT:  Mouth normal, moist mucous membranes, nose normal without drainage or crusting, external ears without lesions, hearing acuity intact  NECK: supple, symmetrical  RESP:  respiratory effort and palpation of chest normal, no chest wall tenderness, no respiratory distress, Lung sounds clear, patient is on room air  CV:  Palpation and auscultation of heart done, rate and rhythm controlled, no murmur, no rub or gallop. Edema none bilateral lower extremities.   ABDOMEN:  normal bowel sounds, soft, nontender.  M/S:   Gait and station did not assess " resting in bed, no tenderness or swelling of the joints; able to move all extremities  SKIN:  Inspection and palpation of skin and subcutaneous tissue: skin on extremities warm, dry and intact without rashes  NEURO: cranial nerves 2-12 grossly intact, no facial asymmetry, no speech deficits and able to follow directions, moves all extremities symmetrically  PSYCH:  insight and judgement impaired, memory forgetful, affect and mood normal     BP Readings from Last 3 Encounters:   07/31/18 122/66   07/25/18 121/72   06/28/18 104/67     Pulse Readings from Last 4 Encounters:   07/31/18 68   07/25/18 78   06/28/18 74   04/18/18 75     Wt Readings from Last 3 Encounters:   07/31/18 123 lb 11.2 oz (56.1 kg)   07/25/18 123 lb 11.2 oz (56.1 kg)   06/28/18 126 lb 6.4 oz (57.3 kg)     Lab/Diagnostic data:  CBC RESULTS:   Recent Labs   Lab Test 07/27/18 08/02/17   09/02/15   1933   07/19/12   1225   WBC  5.4  4.9   < >  7.7   < >  5.6   RBC  2.86*  2.87*   < >  3.67*   < >  3.91   HGB  6.9*  8.2*   < >  11.5*   < >  12.2   HCT  23.9*  26.8*   < >  35.3   < >  36.6   MCV  83.9  93.4   < >  96   < >  94   MCH   --    --    --   31.3   --   31.2   MCHC   --    --    --   32.6   --   33.3   RDW  16.2*  13.9   < >  12.7   < >  12.2   PLT  258  226   < >  249   < >  173    < > = values in this interval not displayed.       Last Basic Metabolic Panel:  Recent Labs   Lab Test 07/27/18 08/02/17   NA  143  142   POTASSIUM  4.2  4.0   CHLORIDE  110*  110*   RILEY  9.5  9.5   CO2  24  25   BUN  32*  30*   CR  2.50*  2.80*   GLC  80  138*       Liver Function Studies -   Recent Labs   Lab Test 07/27/18 08/02/17   PROTTOTAL  6.6  6.2*   ALBUMIN  3.4  3.0*   BILITOTAL  0.2  0.2   ALKPHOS  87  81   AST  16  27   ALT  <10  13       TSH   Date Value Ref Range Status   09/09/2011 2.14 0.4 - 5.0 mU/L Final         ASSESSMENT/PLAN:  1. Anemia due to blood loss, acute    2. Acute upper GI bleed    3. Gastroesophageal reflux disease without esophagitis     4. Lewy body dementia without behavioral disturbance      Acute on chronic issues. Appears stable, some dark stools but no s/s active bleeding.     Orders:  Staff to report today's Hgb results to NP  Check Hgb again on 8/3/18 diagnosis anemia.   PPI as ordered and GI f/u PRN    Electronically signed by:  ASCENCION Rodríguez CNP                    Sincerely,        ASCENCION Monroy CNP

## 2018-08-08 ENCOUNTER — TRANSFERRED RECORDS (OUTPATIENT)
Dept: HEALTH INFORMATION MANAGEMENT | Facility: CLINIC | Age: 83
End: 2018-08-08

## 2018-08-08 LAB — HEMOGLOBIN: 8.8 G/DL (ref 11.8–15.5)

## 2018-08-10 ENCOUNTER — TRANSFERRED RECORDS (OUTPATIENT)
Dept: HEALTH INFORMATION MANAGEMENT | Facility: CLINIC | Age: 83
End: 2018-08-10

## 2018-08-10 LAB
BUN SERPL-MCNC: 40 MG/DL (ref 9–26)
CALCIUM SERPL-MCNC: 9.4 MG/DL (ref 8.4–10.4)
CHLORIDE SERPLBLD-SCNC: 109 MMOL/L (ref 98–109)
CO2 SERPL-SCNC: 28 MMOL/L (ref 22–31)
CREAT SERPL-MCNC: 2.71 MG/DL (ref 0.55–1.02)
GFR SERPL CREATININE-BSD FRML MDRD: 15 ML/MIN/1.73M2
GLUCOSE SERPL-MCNC: 89 MG/DL (ref 70–100)
HEMOGLOBIN: 9.1 G/DL (ref 11.8–15.5)
POTASSIUM SERPL-SCNC: 4.3 MMOL/L (ref 3.5–5.2)
SODIUM SERPL-SCNC: 145 MMOL/L (ref 136–145)

## 2018-10-14 RX ORDER — PRAZOSIN HYDROCHLORIDE 1 MG/1
1 CAPSULE ORAL AT BEDTIME
COMMUNITY
End: 2019-06-03

## 2018-10-14 RX ORDER — MINERAL OIL
2 OIL (ML) MISCELLANEOUS DAILY
COMMUNITY
End: 2021-11-04

## 2018-10-15 ENCOUNTER — NURSING HOME VISIT (OUTPATIENT)
Dept: GERIATRICS | Facility: CLINIC | Age: 83
End: 2018-10-15
Payer: COMMERCIAL

## 2018-10-15 DIAGNOSIS — G31.83 LEWY BODY DEMENTIA WITHOUT BEHAVIORAL DISTURBANCE (H): ICD-10-CM

## 2018-10-15 DIAGNOSIS — K59.01 SLOW TRANSIT CONSTIPATION: ICD-10-CM

## 2018-10-15 DIAGNOSIS — G47.01 INSOMNIA DUE TO MEDICAL CONDITION: ICD-10-CM

## 2018-10-15 DIAGNOSIS — D50.9 IRON DEFICIENCY ANEMIA, UNSPECIFIED IRON DEFICIENCY ANEMIA TYPE: Primary | ICD-10-CM

## 2018-10-15 DIAGNOSIS — F02.80 LEWY BODY DEMENTIA WITHOUT BEHAVIORAL DISTURBANCE (H): ICD-10-CM

## 2018-10-15 DIAGNOSIS — N18.4 CKD (CHRONIC KIDNEY DISEASE) STAGE 4, GFR 15-29 ML/MIN (H): ICD-10-CM

## 2018-10-15 DIAGNOSIS — F31.4 BIPOLAR DISORDER, CURRENT EPISODE DEPRESSED, SEVERE, WITHOUT PSYCHOTIC FEATURES (H): ICD-10-CM

## 2018-10-15 PROCEDURE — 99310 SBSQ NF CARE HIGH MDM 45: CPT | Performed by: INTERNAL MEDICINE

## 2018-10-15 NOTE — LETTER
10/15/2018        RE: Maribel Sevilla  [facility]  [address] [facility] [address]        [facility]  INITIAL VISIT NOTE  October 15, 2018    PRIMARY CARE PROVIDER AND CLINIC:  Bri Wiley [address] [facility] [address] / [place] [address]    Chief Complaint   Patient presents with     Hospital F/U       HPI:    Maribel Sevilla is a 86 year old  (12/20/1931) female who was seen at [facility] on October 15, 2018 for an initial visit after hospitalization. Medical history is notable for bipolar disorder, Lewy body dementia, CKD IV. She was hospitalized at [facility] from 7/29/18 to 7/30/18 where she presented after a syncopal episode on a [employer] vehicle in setting of recent labs with Hgb 6.9. On admission, Hgb 6.7. 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 Biopsy negative for H Pylori. She was transfused 1 unit PRBCs with Hgb 8.1 at discharge. There is no discharge summary available for review. She was re-admitted to this facility for medical management and long-term care.     Today, Ms. Sevilla is seen in her room before lunch. History is limited due to her dementia. Mental status appears at baseline today. Denies pain. No concerns per nursing.     CODE STATUS:   CPR/Full code     ALLERGIES:     Allergies   Allergen Reactions     No Known Allergies        PAST MEDICAL HISTORY:   Past Medical History:   Diagnosis Date     Anemia      Anxiety      Bipolar affective (H)      Dementia     LewyBody Dementia Sept 2011     Depressive disorder      Gastro-oesophageal reflux disease      OA (osteoarthritis) of knee      Renal insufficiencies, chronic        PAST SURGICAL HISTORY:   Past Surgical History:   Procedure Laterality Date     EYE SURGERY      cataract surgery bilaterally     HYSTERECTOMY       SALPINGO-OOPHORECTOMY BILATERAL         FAMILY  HISTORY:   Family History   Problem Relation Age of Onset     C.A.D. Mother      ?     C.A.D. Father      ?     C.A.D. Sister      ?     C.A.D. Brother      ?     Psychotic Disorder Son      suicide     SOCIAL HISTORY:   [facility]    MEDICATIONS:  Current Outpatient Prescriptions   Medication Sig Dispense Refill     acetaminophen (TYLENOL) 500 MG tablet Take 500 mg by mouth 2 times daily and 1 tab every 4 hours as needed       donepezil (ARICEPT) 5 MG tablet Take 5 mg by mouth 2 times daily  30 tablet 0     ferrous sulfate (IRON) 325 (65 Fe) MG tablet Take 325 mg by mouth daily every other day       lamoTRIgine (LAMICTAL) 150 MG tablet Take 200 mg by mouth At Bedtime        melatonin 5 MG tablet Take 5 mg by mouth At Bedtime       mineral oil OIL Place 2 drops into both ears daily       mirtazapine (REMERON) 15 MG tablet Take 30 mg by mouth At Bedtime        Multiple Vitamins-Minerals (OCUVITE ADULT FORMULA) CAPS Take 1 tablet by mouth daily        ondansetron (ZOFRAN) 4 MG tablet Take 4 mg by mouth 4 times daily        pantoprazole (PROTONIX) 40 MG EC tablet Take 40 mg by mouth daily        polyethylene glycol 3350 POWD Take 17 g by mouth daily as needed       prazosin (MINIPRESS) 1 MG capsule Take 1 mg by mouth At Bedtime       QUEtiapine (SEROQUEL) 50 MG tablet Take 150 mg by mouth At Bedtime        senna-docusate (SENNA S) 8.6-50 MG per tablet Take 2 tablets by mouth At Bedtime       sertraline (ZOLOFT) 50 MG tablet Take 150 mg by mouth daily          Post Discharge Medication Reconciliation Status: medication reconcilation previously completed during another office visit.    ROS:  Unable to obtain due to cognitive impairment or aphasia    PHYSICAL EXAM:  /83  Pulse 78  Temp 98.6  F (37  C)  Resp 18   Gen: laying in bed, alert, cooperative and in no acute distress  Resp: breathing non-labored  GI: abdomen soft, not-tender  MSK: normal muscle tone, no LE edema  Neuro: CX II-XII grossly in tact; ROM in  all four extremities grossly in tact  Psych: memory, judgement and insight impaired    LABORATORY/IMAGING DATA:  Reviewed as per Epic    ASSESSMENT/PLAN:    Fe Deficiency Anemia  History of Fe deficiency anemia. EGD as above. No recent iron studies. Transfused 1 unit PRBC at [facility]. Hgb 9.1 on 8/10/18.   -- continues on FeSO4 every other day  -- continues on pantoprazole 40 mg daily   -- would consider getting Fe sat even though she's been on iron  -- would also repeat Hgb with that lab to ensure trending up    Bipolar Disorder  Lewy Body Dementia   She has ongoing cyclical pattern to her moods, sometimes spending a couple days in bed. She is new on prazosin per psychiatry since I last saw her.   -- continues on lamotrigine 200 mg qhs, mirtazapine 30 mg qhs, prazosin 1 mg at bedtime, quetiapine 150 mg qhs and sertraline 150 mg daily  -- supportive cares  -- appreciate cares of in-house psychiatry - ongoing management as per them     CKD, Stage IV   Baseline Cr 2.7-2.8 range. GFR around 15. Unclear etiology of her CKD - no DM nor HTN. No peripheral edema.   -- follow BMP q6 mos  -- avoid nephrotoxic meds    Insomnia  -- continues on melatonin 5 mg     Slow Transit Constipation   -- managed with Miralax 17g daily PRN and Senna-S 2 tabs qhs    Electronically signed by:  Felecia Elizabeth MD                        Sincerely,        Felecia Elizabeth MD

## 2018-10-15 NOTE — PROGRESS NOTES
[facility]  INITIAL VISIT NOTE  October 15, 2018    PRIMARY CARE PROVIDER AND CLINIC:  Bri Wiley [address] [place] [address] 290 / [place] [address]    Chief Complaint   Patient presents with     Hospital F/U       HPI:    Maribel Sevilla is a 86 year old  (12/20/1931) female who was seen at [facility] on October 15, 2018 for an initial visit after hospitalization. Medical history is notable for bipolar disorder, Lewy body dementia, CKD IV. She was hospitalized at [facility] from 7/29/18 to 7/30/18 where she presented after a syncopal episode on a [employer] vehicle in setting of recent labs with Hgb 6.9. On admission, Hgb 6.7. 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 Biopsy negative for H Pylori. She was transfused 1 unit PRBCs with Hgb 8.1 at discharge. There is no discharge summary available for review. She was re-admitted to this facility for medical management and long-term care.     Today, Ms. Sevilla is seen in her room before lunch. History is limited due to her dementia. Mental status appears at baseline today. Denies pain. No concerns per nursing.     CODE STATUS:   CPR/Full code     ALLERGIES:     Allergies   Allergen Reactions     No Known Allergies        PAST MEDICAL HISTORY:   Past Medical History:   Diagnosis Date     Anemia      Anxiety      Bipolar affective (H)      Dementia     LewyBody Dementia Sept 2011     Depressive disorder      Gastro-oesophageal reflux disease      OA (osteoarthritis) of knee      Renal insufficiencies, chronic        PAST SURGICAL HISTORY:   Past Surgical History:   Procedure Laterality Date     EYE SURGERY      cataract surgery bilaterally     HYSTERECTOMY       SALPINGO-OOPHORECTOMY BILATERAL         FAMILY HISTORY:   Family History   Problem Relation Age of Onset     C.A.D. Mother      ?     C.A.D. Father      ?      C.A.D. Sister      ?     C.A.D. Brother      ?     Psychotic Disorder Son      suicide     SOCIAL HISTORY:   Lives in [facility]    MEDICATIONS:  Current Outpatient Prescriptions   Medication Sig Dispense Refill     acetaminophen (TYLENOL) 500 MG tablet Take 500 mg by mouth 2 times daily and 1 tab every 4 hours as needed       donepezil (ARICEPT) 5 MG tablet Take 5 mg by mouth 2 times daily  30 tablet 0     ferrous sulfate (IRON) 325 (65 Fe) MG tablet Take 325 mg by mouth daily every other day       lamoTRIgine (LAMICTAL) 150 MG tablet Take 200 mg by mouth At Bedtime        melatonin 5 MG tablet Take 5 mg by mouth At Bedtime       mineral oil OIL Place 2 drops into both ears daily       mirtazapine (REMERON) 15 MG tablet Take 30 mg by mouth At Bedtime        Multiple Vitamins-Minerals (OCUVITE ADULT FORMULA) CAPS Take 1 tablet by mouth daily        ondansetron (ZOFRAN) 4 MG tablet Take 4 mg by mouth 4 times daily        pantoprazole (PROTONIX) 40 MG EC tablet Take 40 mg by mouth daily        polyethylene glycol 3350 POWD Take 17 g by mouth daily as needed       prazosin (MINIPRESS) 1 MG capsule Take 1 mg by mouth At Bedtime       QUEtiapine (SEROQUEL) 50 MG tablet Take 150 mg by mouth At Bedtime        senna-docusate (SENNA S) 8.6-50 MG per tablet Take 2 tablets by mouth At Bedtime       sertraline (ZOLOFT) 50 MG tablet Take 150 mg by mouth daily          Post Discharge Medication Reconciliation Status: medication reconcilation previously completed during another office visit.    ROS:  Unable to obtain due to cognitive impairment or aphasia    PHYSICAL EXAM:  /83  Pulse 78  Temp 98.6  F (37  C)  Resp 18   Gen: laying in bed, alert, cooperative and in no acute distress  Resp: breathing non-labored  GI: abdomen soft, not-tender  MSK: normal muscle tone, no LE edema  Neuro: CX II-XII grossly in tact; ROM in all four extremities grossly in tact  Psych: memory, judgement and insight impaired    LABORATORY/IMAGING  DATA:  Reviewed as per Epic    ASSESSMENT/PLAN:    Fe Deficiency Anemia  History of Fe deficiency anemia. EGD as above. No recent iron studies. Transfused 1 unit PRBC at W. Hgb 9.1 on 8/10/18.   -- continues on FeSO4 every other day  -- continues on pantoprazole 40 mg daily   -- would consider getting Fe sat even though she's been on iron  -- would also repeat Hgb with that lab to ensure trending up    Bipolar Disorder  Lewy Body Dementia   She has ongoing cyclical pattern to her moods, sometimes spending a couple days in bed. She is new on prazosin per psychiatry since I last saw her.   -- continues on lamotrigine 200 mg qhs, mirtazapine 30 mg qhs, prazosin 1 mg at bedtime, quetiapine 150 mg qhs and sertraline 150 mg daily  -- supportive cares  -- appreciate cares of in-house psychiatry - ongoing management as per them     CKD, Stage IV   Baseline Cr 2.7-2.8 range. GFR around 15. Unclear etiology of her CKD - no DM nor HTN. No peripheral edema.   -- follow BMP q6 mos  -- avoid nephrotoxic meds    Insomnia  -- continues on melatonin 5 mg     Slow Transit Constipation   -- managed with Miralax 17g daily PRN and Senna-S 2 tabs qhs    Electronically signed by:  Felecia Elizabeth MD

## 2018-10-20 VITALS
TEMPERATURE: 98.6 F | SYSTOLIC BLOOD PRESSURE: 106 MMHG | RESPIRATION RATE: 18 BRPM | DIASTOLIC BLOOD PRESSURE: 83 MMHG | HEART RATE: 78 BPM

## 2018-12-04 ENCOUNTER — NURSING HOME VISIT (OUTPATIENT)
Dept: GERIATRICS | Facility: CLINIC | Age: 83
End: 2018-12-04
Payer: COMMERCIAL

## 2018-12-04 VITALS
BODY MASS INDEX: 23.45 KG/M2 | OXYGEN SATURATION: 95 % | TEMPERATURE: 98.9 F | RESPIRATION RATE: 18 BRPM | DIASTOLIC BLOOD PRESSURE: 72 MMHG | HEIGHT: 62 IN | WEIGHT: 127.4 LBS | SYSTOLIC BLOOD PRESSURE: 128 MMHG | HEART RATE: 75 BPM

## 2018-12-04 DIAGNOSIS — H61.23 BILATERAL IMPACTED CERUMEN: ICD-10-CM

## 2018-12-04 DIAGNOSIS — R63.4 WEIGHT LOSS: ICD-10-CM

## 2018-12-04 DIAGNOSIS — F31.4 BIPOLAR DISORDER, CURRENT EPISODE DEPRESSED, SEVERE, WITHOUT PSYCHOTIC FEATURES (H): Primary | ICD-10-CM

## 2018-12-04 PROCEDURE — 99309 SBSQ NF CARE MODERATE MDM 30: CPT | Performed by: NURSE PRACTITIONER

## 2018-12-04 NOTE — PROGRESS NOTES
"  [facility]    Chief Complaint   Patient presents with     senior living Regulatory       [place] Medical Record Number:  [id number]  Place of Service where encounter took place:  [facility] ([facility]) [[id number]]    HPI:    Maribel Sevilla is a 86 year old  (12/20/1931), who is being seen today for a federally mandated E/M visit.  HPI information obtained from: facility chart records, facility staff and patient report.     Today's concerns are:     Bipolar disorder, current episode depressed, severe, without psychotic features (H)  Bilateral impacted cerumen  Weight loss     Resident seen today for a regulatory visit. She is seen today resting in bed with her daughter present. Today is one of her \"down days\" and she is still wearing her pajamas and does not feel like getting OOB for lunch. She notes this has been going on for a few days - she is managed by [facility] for her BPD/Depression. Her daughter notes that she was really down before Thanksgiving and on Thanksgiving snapped out of it when with family and spent the day laughing, enjoying herself and telling stories. She also was like this the weekend after, but called her daughter that Sunday and requested to go to [employer] and spent 6 hours visiting. Aroma therapy has worked well for her in the past -- her diffuser remains in her room, but is not on. She likes the citrus scents - lemon especially. Her daughter also notes that her hearing appears to be worsening - they went in to have her hearing assessed and were told she needed hearing aides. This upset the daughter, because she disagrees with this and states that the resident hears perfectly when she doesn't have cerumen present. Although the resident doesn't join the other resident's for eating meals in the dining room when she is having her down days and has frequent c/o stomach upset/nausea associated with these days her weight remains steady.    Wt Readings from Last 4 Encounters: "   18 57.8 kg (127 lb 6.4 oz)   18 56.1 kg (123 lb 11.2 oz)   18 56.1 kg (123 lb 11.2 oz)   18 57.3 kg (126 lb 6.4 oz)     BP Readin/72  136/74  131/73    HR:  70-85    ALLERGIES: No known allergies  PAST MEDICAL HISTORY:  has a past medical history of Anemia; Anxiety; Bipolar affective (H); Dementia; Depressive disorder; Gastro-oesophageal reflux disease; OA (osteoarthritis) of knee; and Renal insufficiencies, chronic.  PAST SURGICAL HISTORY:  has a past surgical history that includes Salpingo-oophorectomy bilateral; Hysterectomy; and Eye surgery.  FAMILY HISTORY: family history includes C.A.D. in her brother, father, mother, and sister; Psychotic Disorder in her son.  SOCIAL HISTORY:  reports that she has never smoked. She has never used smokeless tobacco. She reports that she does not drink alcohol or use illicit drugs.    MEDICATIONS:  Current Outpatient Prescriptions   Medication Sig Dispense Refill     acetaminophen (TYLENOL) 500 MG tablet Take 500 mg by mouth 2 times daily and 1 tab every 4 hours as needed       donepezil (ARICEPT) 5 MG tablet Take 5 mg by mouth 2 times daily  30 tablet 0     ferrous sulfate (IRON) 325 (65 Fe) MG tablet Take 325 mg by mouth daily every other day       lamoTRIgine (LAMICTAL) 150 MG tablet Take 200 mg by mouth At Bedtime        melatonin 5 MG tablet Take 5 mg by mouth At Bedtime       mineral oil OIL Place 2 drops into both ears daily       mirtazapine (REMERON) 15 MG tablet Take 30 mg by mouth At Bedtime        Multiple Vitamins-Minerals (OCUVITE ADULT FORMULA) CAPS Take 1 tablet by mouth daily        ondansetron (ZOFRAN) 4 MG tablet Take 4 mg by mouth 4 times daily        pantoprazole (PROTONIX) 40 MG EC tablet Take 40 mg by mouth daily        polyethylene glycol 3350 POWD Take 17 g by mouth daily as needed       prazosin (MINIPRESS) 1 MG capsule Take 1 mg by mouth At Bedtime       QUEtiapine (SEROQUEL) 50 MG tablet Take 150 mg by mouth At  "Bedtime        senna-docusate (SENNA S) 8.6-50 MG per tablet Take 2 tablets by mouth At Bedtime       sertraline (ZOLOFT) 50 MG tablet Take 150 mg by mouth daily        Medications reviewed:  Medications reconciled to facility chart and changes were made to reflect current medications as identified as above med list. Below are the changes that were made:   Medications stopped since last EPIC medication reconciliation:   There are no discontinued medications.    Medications started since last [facility] medication reconciliation:  No orders of the defined types were placed in this encounter.    Case Management:  I have reviewed the care plan and MDS and do agree with the plan. Patient's desire to return to the community is present, but is not able due to care needs .  Information reviewed:  Medications, vital signs, orders, and nursing notes.    ROS:  4 point ROS including Respiratory, CV, GI and , other than that noted in the HPI,  is negative    Exam:  Vitals: /72  Pulse 75  Temp 98.9  F (37.2  C)  Resp 18  Ht 5' 2\" (1.575 m)  Wt 127 lb 6.4 oz (57.8 kg)  SpO2 95%  BMI 23.3 kg/m2  BMI= Body mass index is 23.3 kg/(m^2).  GENERAL APPEARANCE:  Alert, in no distress, oriented, cooperative elderly woman resting in bed during the middle of the day in her [facility] noting she's \"having one of those days\"  ENT:  Mouth and posterior oropharynx normal, moist mucous membranes, [place]  EYES:  EOM, conjunctivae, lids, pupils and irises normal  NECK:  No adenopathy, masses or thyromegaly  RESP:  Respiratory effort and palpation of chest normal, lungs clear to auscultation, no respiratory distress  CV:  Palpation and auscultation of heart done, regular rate and rhythm, no murmur, rub, or gallop, no edema, +2 pedal pulses  ABDOMEN: Active bowel sounds x4, soft, nontender, no hepatosplenomegaly or other masses  M/S:   Gait and station normal; Digits and nails abnormal - arthritic changes present  NEURO:   Cranial nerves 2-12 are " normal tested and grossly at patient's baseline  PSYCH:  oriented X 3, normal insight, judgement and memory, affect and mood Pleasant, down; tired.    Lab/Diagnostic data:   CBC RESULTS:   Recent Labs   Lab Test 08/10/18 08/08/18  07/27/18 08/02/17   09/02/15   1933   07/19/12   1225   WBC   --    --    --   5.4  4.9   < >  7.7   < >  5.6   RBC   --    --    --   2.86*  2.87*   < >  3.67*   < >  3.91   HGB  9.1*  8.8*   < >  6.9*  8.2*   < >  11.5*   < >  12.2   HCT   --    --    --   23.9*  26.8*   < >  35.3   < >  36.6   MCV   --    --    --   83.9  93.4   < >  96   < >  94   MCH   --    --    --    --    --    --   31.3   --   31.2   MCHC   --    --    --    --    --    --   32.6   --   33.3   RDW   --    --    --   16.2*  13.9   < >  12.7   < >  12.2   PLT   --    --    --   258  226   < >  249   < >  173    < > = values in this interval not displayed.       Last Basic Metabolic Panel:  Recent Labs   Lab Test 08/10/18 07/30/18 07/27/18   NA  145   --   143   POTASSIUM  4.3  4.7  4.2   CHLORIDE  109   --   110*   RILEY  9.4   --   9.5   CO2  28   --   24   BUN  40*   --   32*   CR  2.71*  2.04*  2.50*   GLC  89  85  80       Liver Function Studies -   Recent Labs   Lab Test 07/27/18 08/02/17   PROTTOTAL  6.6  6.2*   ALBUMIN  3.4  3.0*   BILITOTAL  0.2  0.2   ALKPHOS  87  81   AST  16  27   ALT  <10  13       TSH   Date Value Ref Range Status   09/09/2011 2.14 0.4 - 5.0 mU/L Final         ASSESSMENT/PLAN  (F31.4) Bipolar disorder, current episode depressed, severe, without psychotic features (H)  (primary encounter diagnosis)  Comment: Chronic - ongoing. Continues to have up/down days - continues on regimen of Lamictal, Remeron, Seroquel and Zoloft. Last PHQ9: 02/27.  Plan: Continue medications and management per Psych.    (H61.23) Bilateral impacted cerumen  Comment: Patient with very small ear canals causing recurrent wax buildup - prior attempts at in-house cerumen removal by writer painful leading to need for  "ENT appointment for in-clinic removal. Previously ordered Mineral oil gtts to attempt to keep resident's ear canals lubricated to try and prevent buildup -- scheduled this at  due to the need for it to remain in the canal for a prolonged period of time. Staff altered administration time to follow lunch (a time when resident usually rests) as they were concerned it was not being done at bedtime.   Plan: Continue use of mineral oil -- spoke with staff re:the importance of it needing to sit in the ear canal. So, the patient needs to stay laying down after administration or this will not be helpful. Will f/u in a few weeks to see if there's cerumen present.    (R63.4) Weight loss  Comment: Hx of - patient's weight has remained stable even with varying status of mental health. No longer an active dx. Will remove from active problems list.  Estimated body mass index is 23.3 kg/m  as calculated from the following:    Height as of this encounter: 1.575 m (5' 2\").    Weight as of this encounter: 57.8 kg (127 lb 6.4 oz).   Wt Readings from Last 10 Encounters:   12/04/18 57.8 kg (127 lb 6.4 oz)   07/31/18 56.1 kg (123 lb 11.2 oz)   07/25/18 56.1 kg (123 lb 11.2 oz)   06/28/18 57.3 kg (126 lb 6.4 oz)   02/25/18 56.7 kg (125 lb)   12/14/17 56.2 kg (123 lb 14.4 oz)   10/01/17 59 kg (130 lb)   09/26/17 56.7 kg (125 lb)   08/03/17 59 kg (130 lb)   08/01/17 58.5 kg (129 lb)   Plan: Resolved.    Electronically signed by:  ASCENCION Rm, [facility]  Phone: [phone]  Fax: [phone]    "

## 2018-12-04 NOTE — LETTER
"    12/4/2018        RE: Maribel Sevilla  [facility]  [address] [facility] [address]          [place] [facility]    Chief Complaint   Patient presents with     jail Regulatory       [facility] Record Number:  [id number]  Place of Service where encounter took place:  [facility] ([facility]) [[id number]]    HPI:    Maribel Sevilla is a 86 year old  (12/20/1931), who is being seen today for a federally mandated E/M visit.  HPI information obtained from: facility chart records, facility staff and patient report.     Today's concerns are:     Bipolar disorder, current episode depressed, severe, without psychotic features (H)  Bilateral impacted cerumen  Weight loss     Resident seen today for a regulatory visit. She is seen today resting in bed with her daughter present. Today is one of her \"down days\" and she is still wearing her pajamas and does not feel like getting OOB for lunch. She notes this has been going on for a few days - she is managed by [facility] for her BPD/Depression. Her daughter notes that she was really down before Thanksgiving and on Thanksgiving snapped out of it when with family and spent the day laughing, enjoying herself and telling stories. She also was like this the weekend after, but called her daughter that Sunday and requested to go to [facility] and spent 6 hours visiting. Aroma therapy has worked well for her in the past -- her diffuser remains in her room, but is not on. She likes the citrus scents - lemon especially. Her daughter also notes that her hearing appears to be worsening - they went in to have her hearing assessed and were told she needed hearing aides. This upset the daughter, because she disagrees with this and states that the resident hears perfectly when she doesn't have cerumen present. Although the resident doesn't join the other resident's for eating meals in the dining room when she is having her down days and has frequent c/o " stomach upset/nausea associated with these days her weight remains steady.    Wt Readings from Last 4 Encounters:   18 57.8 kg (127 lb 6.4 oz)   18 56.1 kg (123 lb 11.2 oz)   18 56.1 kg (123 lb 11.2 oz)   18 57.3 kg (126 lb 6.4 oz)     BP Readin/72  136/74  131/73    HR:  70-85    ALLERGIES: No known allergies  PAST MEDICAL HISTORY:  has a past medical history of Anemia; Anxiety; Bipolar affective (H); Dementia; Depressive disorder; Gastro-oesophageal reflux disease; OA (osteoarthritis) of knee; and Renal insufficiencies, chronic.  PAST SURGICAL HISTORY:  has a past surgical history that includes Salpingo-oophorectomy bilateral; Hysterectomy; and Eye surgery.  FAMILY HISTORY: family history includes C.A.D. in her brother, father, mother, and sister; Psychotic Disorder in her son.  SOCIAL HISTORY:  reports that she has never smoked. She has never used smokeless tobacco. She reports that she does not drink alcohol or use illicit drugs.    MEDICATIONS:  Current Outpatient Prescriptions   Medication Sig Dispense Refill     acetaminophen (TYLENOL) 500 MG tablet Take 500 mg by mouth 2 times daily and 1 tab every 4 hours as needed       donepezil (ARICEPT) 5 MG tablet Take 5 mg by mouth 2 times daily  30 tablet 0     ferrous sulfate (IRON) 325 (65 Fe) MG tablet Take 325 mg by mouth daily every other day       lamoTRIgine (LAMICTAL) 150 MG tablet Take 200 mg by mouth At Bedtime        melatonin 5 MG tablet Take 5 mg by mouth At Bedtime       mineral oil OIL Place 2 drops into both ears daily       mirtazapine (REMERON) 15 MG tablet Take 30 mg by mouth At Bedtime        Multiple Vitamins-Minerals (OCUVITE ADULT FORMULA) CAPS Take 1 tablet by mouth daily        ondansetron (ZOFRAN) 4 MG tablet Take 4 mg by mouth 4 times daily        pantoprazole (PROTONIX) 40 MG EC tablet Take 40 mg by mouth daily        polyethylene glycol 3350 POWD Take 17 g by mouth daily as needed       prazosin (MINIPRESS)  "1 MG capsule Take 1 mg by mouth At Bedtime       QUEtiapine (SEROQUEL) 50 MG tablet Take 150 mg by mouth At Bedtime        senna-docusate (SENNA S) 8.6-50 MG per tablet Take 2 tablets by mouth At Bedtime       sertraline (ZOLOFT) 50 MG tablet Take 150 mg by mouth daily        Medications reviewed:  Medications reconciled to facility chart and changes were made to reflect current medications as identified as above med list. Below are the changes that were made:   Medications stopped since last EPIC medication reconciliation:   There are no discontinued medications.    Medications started since last [facility] medication reconciliation:  No orders of the defined types were placed in this encounter.    Case Management:  I have reviewed the care plan and MDS and do agree with the plan. Patient's desire to return to the community is present, but is not able due to care needs .  Information reviewed:  Medications, vital signs, orders, and nursing notes.    ROS:  4 point ROS including Respiratory, CV, GI and , other than that noted in the HPI,  is negative    Exam:  Vitals: /72  Pulse 75  Temp 98.9  F (37.2  C)  Resp 18  Ht 5' 2\" (1.575 m)  Wt 127 lb 6.4 oz (57.8 kg)  SpO2 95%  BMI 23.3 kg/m2  BMI= Body mass index is 23.3 kg/(m^2).  GENERAL APPEARANCE:  Alert, in no distress, oriented, cooperative elderly woman resting in bed during the middle of the day in her [facility] noting she's \"having one of those days\"  ENT:  Mouth and posterior oropharynx normal, moist mucous membranes, [place]  EYES:  EOM, conjunctivae, lids, pupils and irises normal  NECK:  No adenopathy, masses or thyromegaly  RESP:  Respiratory effort and palpation of chest normal, lungs clear to auscultation, no respiratory distress  CV:  Palpation and auscultation of heart done, regular rate and rhythm, no murmur, rub, or gallop, no edema, +2 pedal pulses  ABDOMEN: Active bowel sounds x4, soft, nontender, no hepatosplenomegaly or other masses  M/S:   Gait " and station normal; Digits and nails abnormal - arthritic changes present  NEURO:   Cranial nerves 2-12 are normal tested and grossly at patient's baseline  PSYCH:  oriented X 3, normal insight, judgement and memory, affect and mood Pleasant, down; tired.    Lab/Diagnostic data:   CBC RESULTS:   Recent Labs   Lab Test 08/10/18 08/08/18  07/27/18 08/02/17   09/02/15   1933   07/19/12   1225   WBC   --    --    --   5.4  4.9   < >  7.7   < >  5.6   RBC   --    --    --   2.86*  2.87*   < >  3.67*   < >  3.91   HGB  9.1*  8.8*   < >  6.9*  8.2*   < >  11.5*   < >  12.2   HCT   --    --    --   23.9*  26.8*   < >  35.3   < >  36.6   MCV   --    --    --   83.9  93.4   < >  96   < >  94   MCH   --    --    --    --    --    --   31.3   --   31.2   MCHC   --    --    --    --    --    --   32.6   --   33.3   RDW   --    --    --   16.2*  13.9   < >  12.7   < >  12.2   PLT   --    --    --   258  226   < >  249   < >  173    < > = values in this interval not displayed.       Last Basic Metabolic Panel:  Recent Labs   Lab Test 08/10/18 07/30/18 07/27/18   NA  145   --   143   POTASSIUM  4.3  4.7  4.2   CHLORIDE  109   --   110*   RILEY  9.4   --   9.5   CO2  28   --   24   BUN  40*   --   32*   CR  2.71*  2.04*  2.50*   GLC  89  85  80       Liver Function Studies -   Recent Labs   Lab Test 07/27/18 08/02/17   PROTTOTAL  6.6  6.2*   ALBUMIN  3.4  3.0*   BILITOTAL  0.2  0.2   ALKPHOS  87  81   AST  16  27   ALT  <10  13       TSH   Date Value Ref Range Status   09/09/2011 2.14 0.4 - 5.0 mU/L Final         ASSESSMENT/PLAN  (F31.4) Bipolar disorder, current episode depressed, severe, without psychotic features (H)  (primary encounter diagnosis)  Comment: Chronic - ongoing. Continues to have up/down days - continues on regimen of Lamictal, Remeron, Seroquel and Zoloft. Last PHQ9: 02/27.  Plan: Continue medications and management per Psych.    (H61.23) Bilateral impacted cerumen  Comment: Patient with very small ear canals causing  "recurrent wax buildup - prior attempts at in-house cerumen removal by writer painful leading to need for ENT appointment for in-clinic removal. Previously ordered Mineral oil gtts to attempt to keep resident's ear canals lubricated to try and prevent buildup -- scheduled this at  due to the need for it to remain in the canal for a prolonged period of time. Staff altered administration time to follow lunch (a time when resident usually rests) as they were concerned it was not being done at bedtime.   Plan: Continue use of mineral oil -- spoke with staff re:the importance of it needing to sit in the ear canal. So, the patient needs to stay laying down after administration or this will not be helpful. Will f/u in a few weeks to see if there's cerumen present.    (R63.4) Weight loss  Comment: Hx of - patient's weight has remained stable even with varying status of mental health. No longer an active dx. Will remove from active problems list.  Estimated body mass index is 23.3 kg/m  as calculated from the following:    Height as of this encounter: 1.575 m (5' 2\").    Weight as of this encounter: 57.8 kg (127 lb 6.4 oz).   Wt Readings from Last 10 Encounters:   12/04/18 57.8 kg (127 lb 6.4 oz)   07/31/18 56.1 kg (123 lb 11.2 oz)   07/25/18 56.1 kg (123 lb 11.2 oz)   06/28/18 57.3 kg (126 lb 6.4 oz)   02/25/18 56.7 kg (125 lb)   12/14/17 56.2 kg (123 lb 14.4 oz)   10/01/17 59 kg (130 lb)   09/26/17 56.7 kg (125 lb)   08/03/17 59 kg (130 lb)   08/01/17 58.5 kg (129 lb)   Plan: Resolved.    Electronically signed by:  ASCENCION Rm, [facility]  Phone: [phone]  Fax: [phone]        Sincerely,        ASCENCION Rm CNP    "

## 2018-12-12 PROBLEM — R63.4 WEIGHT LOSS: Status: RESOLVED | Noted: 2017-04-20 | Resolved: 2018-12-12

## 2019-02-08 ENCOUNTER — TRANSFERRED RECORDS (OUTPATIENT)
Dept: HEALTH INFORMATION MANAGEMENT | Facility: CLINIC | Age: 84
End: 2019-02-08

## 2019-02-08 LAB
BUN SERPL-MCNC: 42 MG/DL (ref 9–26)
CALCIUM SERPL-MCNC: 9.6 MG/DL (ref 8.4–10.4)
CHLORIDE SERPLBLD-SCNC: 109 MMOL/L (ref 98–109)
CO2 SERPL-SCNC: 26 MMOL/L (ref 22–31)
CREAT SERPL-MCNC: 2.95 MG/DL (ref 0.55–1.02)
GFR SERPL CREATININE-BSD FRML MDRD: 14 ML/MIN/1.73M2
GLUCOSE SERPL-MCNC: 87 MG/DL (ref 70–100)
POTASSIUM SERPL-SCNC: 4.7 MMOL/L (ref 3.5–5.2)
SODIUM SERPL-SCNC: 145 MMOL/L (ref 136–145)

## 2019-02-10 VITALS
WEIGHT: 128.4 LBS | DIASTOLIC BLOOD PRESSURE: 85 MMHG | OXYGEN SATURATION: 94 % | SYSTOLIC BLOOD PRESSURE: 134 MMHG | BODY MASS INDEX: 23.63 KG/M2 | RESPIRATION RATE: 18 BRPM | HEIGHT: 62 IN | HEART RATE: 76 BPM | TEMPERATURE: 98.3 F

## 2019-02-10 ASSESSMENT — MIFFLIN-ST. JEOR: SCORE: 970.67

## 2019-02-11 ENCOUNTER — NURSING HOME VISIT (OUTPATIENT)
Dept: GERIATRICS | Facility: CLINIC | Age: 84
End: 2019-02-11
Payer: COMMERCIAL

## 2019-02-11 DIAGNOSIS — F31.9 BIPOLAR AFFECTIVE DISORDER, REMISSION STATUS UNSPECIFIED (H): Primary | ICD-10-CM

## 2019-02-11 DIAGNOSIS — F02.80 LEWY BODY DEMENTIA WITHOUT BEHAVIORAL DISTURBANCE (H): ICD-10-CM

## 2019-02-11 DIAGNOSIS — K29.80 DUODENITIS: ICD-10-CM

## 2019-02-11 DIAGNOSIS — G31.83 LEWY BODY DEMENTIA WITHOUT BEHAVIORAL DISTURBANCE (H): ICD-10-CM

## 2019-02-11 DIAGNOSIS — K22.2 SCHATZKI'S RING: ICD-10-CM

## 2019-02-11 DIAGNOSIS — N18.4 CKD (CHRONIC KIDNEY DISEASE) STAGE 4, GFR 15-29 ML/MIN (H): ICD-10-CM

## 2019-02-11 PROCEDURE — 99309 SBSQ NF CARE MODERATE MDM 30: CPT | Performed by: INTERNAL MEDICINE

## 2019-02-11 NOTE — LETTER
"    2/11/2019        RE: Maribel Sevilla  [facility]  [address] [facility] [address]        [place] [facility]  Nursing Home Regulatory Visit  February 11, 2019    Chief Complaint   Patient presents with     [facility] Regulatory       HPI:    Maribel Sevilla is a 87 year old  (12/20/1931), who is being seen today for a federally mandated E/M visit at [facility]. Today's concerns are:    1) Bipolar Disorder / Lewy Body Dementia -- Ongoing cyclical pattern to her mood - currently cycling \"down\" and spending more time in bed. Follows with psychiatry. Managed with donepezil 5 mg BID< lamotrigine 200 mg qhs, mirtazapine 30 mg qhs, prazosin 1 mg at bedtime, quetiapine 150 mg qhs and sertraline 150 mg daily  2) Duodenitis / Schatzki's Ring s/p Dilatation (July) -- found to have Hgb 6.9 after a syncopal episode. EGD with focally erosive duodenitis. Neg H Pylori. Continues on pantoprazole 40 mg daily   3) CKD, Stage IV -- Baseline Cr 2.7-2.8 range. GFR around 15. Unclear etiology of her CKD - no DM nor HTN. No peripheral edema    ALLERGIES: No known allergies    PAST MEDICAL HISTORY:   Past Medical History:   Diagnosis Date     Anemia      Anxiety      Bipolar affective (H)      Dementia     LewyBody Dementia Sept 2011     Depressive disorder      Gastro-oesophageal reflux disease      OA (osteoarthritis) of knee      Renal insufficiencies, chronic        PAST SURGICAL HISTORY:   Past Surgical History:   Procedure Laterality Date     EYE SURGERY      cataract surgery bilaterally     HYSTERECTOMY       SALPINGO-OOPHORECTOMY BILATERAL         FAMILY HISTORY:   Family History   Problem Relation Age of Onset     C.A.D. Mother         ?     C.A.D. Father         ?     C.A.D. Sister         ?     C.A.D. Brother         ?     Psychotic Disorder Son         suicide       SOCIAL HISTORY:   Lives in a SNF    MEDICATIONS:  Current Outpatient Medications   Medication Sig Dispense Refill     " "acetaminophen (TYLENOL) 500 MG tablet Take 500 mg by mouth 2 times daily and 1 tab every 4 hours as needed       donepezil (ARICEPT) 5 MG tablet Take 5 mg by mouth 2 times daily  30 tablet 0     ferrous sulfate (IRON) 325 (65 Fe) MG tablet Take 325 mg by mouth daily every other day       lamoTRIgine (LAMICTAL) 150 MG tablet Take 200 mg by mouth At Bedtime        melatonin 5 MG tablet Take 5 mg by mouth At Bedtime       mineral oil OIL Place 2 drops into both ears daily       mirtazapine (REMERON) 15 MG tablet Take 30 mg by mouth At Bedtime        Multiple Vitamins-Minerals (OCUVITE ADULT FORMULA) CAPS Take 1 tablet by mouth daily        ondansetron (ZOFRAN) 4 MG tablet Take 4 mg by mouth 4 times daily        pantoprazole (PROTONIX) 40 MG EC tablet Take 40 mg by mouth daily        polyethylene glycol 3350 POWD Take 17 g by mouth daily as needed       prazosin (MINIPRESS) 1 MG capsule Take 1 mg by mouth At Bedtime       QUEtiapine (SEROQUEL) 50 MG tablet Take 150 mg by mouth At Bedtime        senna-docusate (SENNA S) 8.6-50 MG per tablet Take 2 tablets by mouth At Bedtime       sertraline (ZOLOFT) 50 MG tablet Take 150 mg by mouth daily          Medications reviewed:  Medications reconciled to facility chart and changes were made to reflect current medications as identified as above med list. Below are the changes that were made:   Medications stopped since last EPIC medication reconciliation:   There are no discontinued medications.  Medications started since last [facility] medication reconciliation:  No orders of the defined types were placed in this encounter.      Case Management:  I have reviewed the care plan and MDS and do agree with the plan.   Information reviewed:  Medications, vital signs, orders, and nursing notes.    ROS:  Unable to be obtained due to cognitive impairment or aphasia.     PHYSICAL EXAM:  /85   Pulse 76   Temp 98.3  F (36.8  C)   Resp 18   Ht 1.575 m (5' 2\")   Wt 58.2 kg (128 lb 6.4 " "oz)   SpO2 94%   BMI 23.48 kg/m     Gen: laying in bed, alert and in no acute distress  Resp: breathing non labored  GI: abdomen soft, not-tender  Ext: no LE edema  Neuro: CX II-XII grossly in tact; ROM in all four extremities grossly in tact  Psych: memory, judgement and insight impaired    Lab/Diagnostic data:  Reviewed as per Epic    ASSESSMENT/PLAN    1) Bipolar Disorder / Lewy Body Dementia   Ongoing cyclical pattern to her mood - currently cycling \"down\" and spending more time in bed.  -- continues on donepezil 5 mg BID< lamotrigine 200 mg qhs, mirtazapine 30 mg qhs, prazosin 1 mg at bedtime, quetiapine 150 mg qhs and sertraline 150 mg daily  -- ongoing management of in-house psychiatry - appreciate their expertise    2) Duodenitis / Schatzki's Ring s/p Dilatation (July)   Found to have Hgb 6.9 after a syncopal episode. EGD with focally erosive duodenitis. Neg H Pylori. Most recent Hgb 8.1 in August.   -- continues on pantoprazole 40 mg daily   -- consider Hgb when draw next BMP    3) CKD, Stage IV   Baseline Cr 2.7-2.8 range. GFR around 15. Unclear etiology of her CKD - no DM nor HTN. No peripheral edema. Most recent Cr 2.7 in August  -- q6 mo BMP as long as consistent with goals of care  -- avoid nephrotoxic meds    Electronically signed by:  Felecia Elizabeth MD        Sincerely,        Felecia Elizabeth MD    "

## 2019-02-11 NOTE — PROGRESS NOTES
"[place] [facility]  Nursing Home Regulatory Visit  February 11, 2019    Chief Complaint   Patient presents with     group home Regulatory       HPI:    Maribel Sevilla is a 87 year old  (12/20/1931), who is being seen today for a federally mandated E/M visit at [facility]. Today's concerns are:    1) Bipolar Disorder / Lewy Body Dementia -- Ongoing cyclical pattern to her mood - currently cycling \"down\" and spending more time in bed. Follows with psychiatry. Managed with donepezil 5 mg BID< lamotrigine 200 mg qhs, mirtazapine 30 mg qhs, prazosin 1 mg at bedtime, quetiapine 150 mg qhs and sertraline 150 mg daily  2) Duodenitis / Schatzki's Ring s/p Dilatation (July) -- found to have Hgb 6.9 after a syncopal episode. EGD with focally erosive duodenitis. Neg H Pylori. Continues on pantoprazole 40 mg daily   3) CKD, Stage IV -- Baseline Cr 2.7-2.8 range. GFR around 15. Unclear etiology of her CKD - no DM nor HTN. No peripheral edema    ALLERGIES: No known allergies    PAST MEDICAL HISTORY:   Past Medical History:   Diagnosis Date     Anemia      Anxiety      Bipolar affective (H)      Dementia     LewyBody Dementia Sept 2011     Depressive disorder      Gastro-oesophageal reflux disease      OA (osteoarthritis) of knee      Renal insufficiencies, chronic        PAST SURGICAL HISTORY:   Past Surgical History:   Procedure Laterality Date     EYE SURGERY      cataract surgery bilaterally     HYSTERECTOMY       SALPINGO-OOPHORECTOMY BILATERAL         FAMILY HISTORY:   Family History   Problem Relation Age of Onset     C.A.D. Mother         ?     C.A.D. Father         ?     C.A.D. Sister         ?     C.A.D. Brother         ?     Psychotic Disorder Son         suicide       SOCIAL HISTORY:   Lives in a SNF    MEDICATIONS:  Current Outpatient Medications   Medication Sig Dispense Refill     acetaminophen (TYLENOL) 500 MG tablet Take 500 mg by mouth 2 times daily and 1 tab every 4 hours as needed       " "donepezil (ARICEPT) 5 MG tablet Take 5 mg by mouth 2 times daily  30 tablet 0     ferrous sulfate (IRON) 325 (65 Fe) MG tablet Take 325 mg by mouth daily every other day       lamoTRIgine (LAMICTAL) 150 MG tablet Take 200 mg by mouth At Bedtime        melatonin 5 MG tablet Take 5 mg by mouth At Bedtime       mineral oil OIL Place 2 drops into both ears daily       mirtazapine (REMERON) 15 MG tablet Take 30 mg by mouth At Bedtime        Multiple Vitamins-Minerals (OCUVITE ADULT FORMULA) CAPS Take 1 tablet by mouth daily        ondansetron (ZOFRAN) 4 MG tablet Take 4 mg by mouth 4 times daily        pantoprazole (PROTONIX) 40 MG EC tablet Take 40 mg by mouth daily        polyethylene glycol 3350 POWD Take 17 g by mouth daily as needed       prazosin (MINIPRESS) 1 MG capsule Take 1 mg by mouth At Bedtime       QUEtiapine (SEROQUEL) 50 MG tablet Take 150 mg by mouth At Bedtime        senna-docusate (SENNA S) 8.6-50 MG per tablet Take 2 tablets by mouth At Bedtime       sertraline (ZOLOFT) 50 MG tablet Take 150 mg by mouth daily          Medications reviewed:  Medications reconciled to facility chart and changes were made to reflect current medications as identified as above med list. Below are the changes that were made:   Medications stopped since last EPIC medication reconciliation:   There are no discontinued medications.  Medications started since last [facility] medication reconciliation:  No orders of the defined types were placed in this encounter.      Case Management:  I have reviewed the care plan and MDS and do agree with the plan.   Information reviewed:  Medications, vital signs, orders, and nursing notes.    ROS:  Unable to be obtained due to cognitive impairment or aphasia.     PHYSICAL EXAM:  /85   Pulse 76   Temp 98.3  F (36.8  C)   Resp 18   Ht 1.575 m (5' 2\")   Wt 58.2 kg (128 lb 6.4 oz)   SpO2 94%   BMI 23.48 kg/m    Gen: laying in bed, alert and in no acute distress  Resp: breathing non " "labored  GI: abdomen soft, not-tender  Ext: no LE edema  Neuro: CX II-XII grossly in tact; ROM in all four extremities grossly in tact  Psych: memory, judgement and insight impaired    Lab/Diagnostic data:  Reviewed as per Epic    ASSESSMENT/PLAN    1) Bipolar Disorder / Lewy Body Dementia   Ongoing cyclical pattern to her mood - currently cycling \"down\" and spending more time in bed.  -- continues on donepezil 5 mg BID< lamotrigine 200 mg qhs, mirtazapine 30 mg qhs, prazosin 1 mg at bedtime, quetiapine 150 mg qhs and sertraline 150 mg daily  -- ongoing management of in-house psychiatry - appreciate their expertise    2) Duodenitis / Schatzki's Ring s/p Dilatation (July)   Found to have Hgb 6.9 after a syncopal episode. EGD with focally erosive duodenitis. Neg H Pylori. Most recent Hgb 8.1 in August.   -- continues on pantoprazole 40 mg daily   -- consider Hgb when draw next BMP    3) CKD, Stage IV   Baseline Cr 2.7-2.8 range. GFR around 15. Unclear etiology of her CKD - no DM nor HTN. No peripheral edema. Most recent Cr 2.7 in August  -- q6 mo BMP as long as consistent with goals of care  -- avoid nephrotoxic meds    Electronically signed by:  Felecia Elizabeth MD    "

## 2019-04-18 ENCOUNTER — NURSING HOME VISIT (OUTPATIENT)
Dept: GERIATRICS | Facility: CLINIC | Age: 84
End: 2019-04-18
Payer: COMMERCIAL

## 2019-04-18 VITALS
DIASTOLIC BLOOD PRESSURE: 81 MMHG | TEMPERATURE: 98.9 F | SYSTOLIC BLOOD PRESSURE: 147 MMHG | HEIGHT: 62 IN | OXYGEN SATURATION: 97 % | BODY MASS INDEX: 24.2 KG/M2 | WEIGHT: 131.5 LBS | HEART RATE: 73 BPM | RESPIRATION RATE: 18 BRPM

## 2019-04-18 DIAGNOSIS — G31.83 LEWY BODY DEMENTIA WITHOUT BEHAVIORAL DISTURBANCE (H): ICD-10-CM

## 2019-04-18 DIAGNOSIS — F31.9 BIPOLAR AFFECTIVE DISORDER, REMISSION STATUS UNSPECIFIED (H): Primary | ICD-10-CM

## 2019-04-18 DIAGNOSIS — R10.84 ABDOMINAL PAIN, GENERALIZED: ICD-10-CM

## 2019-04-18 DIAGNOSIS — N18.4 CKD (CHRONIC KIDNEY DISEASE) STAGE 4, GFR 15-29 ML/MIN (H): ICD-10-CM

## 2019-04-18 DIAGNOSIS — F02.80 LEWY BODY DEMENTIA WITHOUT BEHAVIORAL DISTURBANCE (H): ICD-10-CM

## 2019-04-18 PROCEDURE — 99309 SBSQ NF CARE MODERATE MDM 30: CPT | Performed by: NURSE PRACTITIONER

## 2019-04-18 ASSESSMENT — MIFFLIN-ST. JEOR: SCORE: 984.73

## 2019-04-18 NOTE — PROGRESS NOTES
[place] [facility]  Chief Complaint   Patient presents with     FPC Regulatory     [place] [facility] Record Number:  [id number]  Place of Service where encounter took place:  [facility] ([facility] [[id number]]      HPI:    Maribel Sevilla  is 87 year old (12/20/1931), who is being seen today for a federally mandated E/M visit.  HPI information obtained from: facility chart records, facility staff, patient report and [facility] chart review.     Today's concerns are:  Bipolar affective disorder, remission status unspecified (H)  Lewy body dementia without behavioral disturbance  Abdominal pain, generalized  CKD (chronic kidney disease) stage 4, GFR 15-29 ml/min (H)    Ms. Sevilla was seen today for regulatory visit. She is resting in bed at time of visit. She tells me she is tired this morning. She also reports she has not been out of bed this morning. Her body feels heavy. She also reports having generalized pain, but she declined her scheduled tylenol today. Per nursing she has been spending a lot of time in bed lately, but her mood generally is improved later in the day. Ms. Sevilla tells me today that overall her mood is down. She tells me she had been participating in activities, however that was awhile ago. She is sleeping well at night, but reports having nightmares nearly every night.     She has not had breakfast yet today. She reports some generalized abdominal pain today, which per chart review is chronic. No other associated symptoms today. No recent emesis. Is on scheduled zofran. Bowels are moving regularly- daily per nursing documentation. Weight is stable as below.       Last 4 BPs: 118/66, 147/81, 111/63, 133/77  HR: 70-73    Wt Readings from Last 4 Encounters:   04/18/19 59.6 kg (131 lb 8 oz)   02/10/19 58.2 kg (128 lb 6.4 oz)   12/04/18 57.8 kg (127 lb 6.4 oz)   07/31/18 56.1 kg (123 lb 11.2 oz)       ALLERGIES:No known allergies  PAST MEDICAL HISTORY:   has a past  medical history of Anemia, Anxiety, Bipolar affective (H), Dementia, Depressive disorder, Gastro-oesophageal reflux disease, OA (osteoarthritis) of knee, and Renal insufficiencies, chronic.  PAST SURGICAL HISTORY:   has a past surgical history that includes Salpingo-oophorectomy bilateral; Hysterectomy; and Eye surgery.  FAMILY HISTORY: family history includes C.A.D. in her brother, father, mother, and sister; Psychotic Disorder in her son.  SOCIAL HISTORY:  reports that she has never smoked. She has never used smokeless tobacco. She reports that she does not drink alcohol or use drugs.    MEDICATIONS:  Current Outpatient Medications   Medication Sig Dispense Refill     acetaminophen (TYLENOL) 500 MG tablet Take 500 mg by mouth 2 times daily and 1 tab every 4 hours as needed       donepezil (ARICEPT) 5 MG tablet Take 5 mg by mouth 2 times daily  30 tablet 0     ferrous sulfate (IRON) 325 (65 Fe) MG tablet Take 325 mg by mouth daily every other day       lamoTRIgine (LAMICTAL) 150 MG tablet Take 200 mg by mouth At Bedtime        melatonin 5 MG tablet Take 5 mg by mouth At Bedtime       mineral oil OIL Place 2 drops into both ears daily       mirtazapine (REMERON) 15 MG tablet Take 30 mg by mouth At Bedtime        Multiple Vitamins-Minerals (OCUVITE ADULT FORMULA) CAPS Take 1 tablet by mouth daily        ondansetron (ZOFRAN) 4 MG tablet Take 4 mg by mouth 4 times daily        pantoprazole (PROTONIX) 40 MG EC tablet Take 40 mg by mouth daily        polyethylene glycol 3350 POWD Take 17 g by mouth daily as needed       prazosin (MINIPRESS) 1 MG capsule Take 1 mg by mouth At Bedtime       QUEtiapine (SEROQUEL) 50 MG tablet Take 150 mg by mouth At Bedtime        senna-docusate (SENNA S) 8.6-50 MG per tablet Take 2 tablets by mouth At Bedtime       sertraline (ZOLOFT) 50 MG tablet Take 150 mg by mouth daily        Case Management:  I have reviewed the care plan and MDS and do agree with the plan. Patient's desire to return  "to the community is present, but is not able due to care needs . Information reviewed:  Medications, vital signs, orders, and nursing notes.    ROS:  5 point ROS of systems including Respiratory, Cardiovascular, Gastroenterology, Genitourinary, Psychiatric were all negative except for pertinent positives noted in my HPI.    Vitals:  /81   Pulse 73   Temp 98.9  F (37.2  C)   Resp 18   Ht 1.575 m (5' 2\")   Wt 59.6 kg (131 lb 8 oz)   SpO2 97%   BMI 24.05 kg/m    Body mass index is 24.05 kg/m .  Exam:  GENERAL APPEARANCE:  Alert, pleasant and cooperative, lying in bed on exam today  HEENT: normocephalic, moist mucous membranes, [place]    RESP:  respiratory effort normal, no respiratory distress, Lung sounds clear, patient is on RA  CV: auscultation of heart done, rate and rhythm regular. No peripheral edema   ABDOMEN:  + bowel sounds, soft, tender with palpation throughout abdomen  M/S: no tenderness or swelling of the joints; able to move all extremities   NEURO: cranial nerves 2-12 grossly intact, no facial asymmetry, no speech deficits and able to follow directions, moves all extremities symmetrically  PSYCH: normal insight and judgement, memory baeline, affect flat and mood down      Lab/Diagnostic data:   Recent labs in [facility] reviewed by me today.     ASSESSMENT/PLAN  (F31.9) Bipolar affective disorder, remission status unspecified (H)  (primary encounter diagnosis)  (G31.83,  F02.80) Lewy body dementia without behavioral disturbance  Comment: Chronic, reports mood is down today, has been spending a lot of time in bed lately per nursing documentation, also reports nightmares nearly every night  Plan: Continue donepezil, lamotrigine, mitrazapine, prazosin, seroquel, sertraline. Psychiatry in house to continue to follow patient.     (R10.84) Abdominal pain, generalized  Comment: Chronic, per chart review- bowels moving well, no recent emesis, with history of GERD, duodenitis/ Schatzki's RIng s/p dilation in " July  Plan: Continue protonix, zofran scheduled and PRN, senna s scheduled and PRN. Encourage patient to take tylenol scheduled and PRN. Continue to monitor. Consider hgb recheck at next BMP check    (N18.4) CKD (chronic kidney disease) stage 4, GFR 15-29 ml/min (H)  Comment: Chronic, creatinine slightly above baseline at last check- etiology of CKD not clear  Creatinine   Date Value Ref Range Status   02/08/2019 2.95 (A) 0.55 - 1.02 mg/dL Final   -Baseline creatinine 2.7-2.8  Plan: Avoid nephrotoxins. BMP q6 months- due next in August.       Electronically signed by:  ASCENCION Ken CNP

## 2019-04-18 NOTE — LETTER
4/18/2019        RE: Maribel Sevilla  [facility]  [address] [facility] [address]        [place] [facility]  Chief Complaint   Patient presents with     FDC Regulatory     [place] [facility] Record Number:  [id number]  Place of Service where encounter took place:  [facility] ([facility]) [[id number]]      HPI:    Maribel Sevilla  is 87 year old (12/20/1931), who is being seen today for a federally mandated E/M visit.  HPI information obtained from: facility chart records, facility staff, patient report and [facility] chart review.     Today's concerns are:  Bipolar affective disorder, remission status unspecified (H)  Lewy body dementia without behavioral disturbance  Abdominal pain, generalized  CKD (chronic kidney disease) stage 4, GFR 15-29 ml/min (H)    Ms. Sevilla was seen today for regulatory visit. She is resting in bed at time of visit. She tells me she is tired this morning. She also reports she has not been out of bed this morning. Her body feels heavy. She also reports having generalized pain, but she declined her scheduled tylenol today. Per nursing she has been spending a lot of time in bed lately, but her mood generally is improved later in the day. Ms. Sevilla tells me today that overall her mood is down. She tells me she had been participating in activities, however that was awhile ago. She is sleeping well at night, but reports having nightmares nearly every night.     She has not had breakfast yet today. She reports some generalized abdominal pain today, which per chart review is chronic. No other associated symptoms today. No recent emesis. Is on scheduled zofran. Bowels are moving regularly- daily per nursing documentation. Weight is stable as below.       Last 4 BPs: 118/66, 147/81, 111/63, 133/77  HR: 70-73    Wt Readings from Last 4 Encounters:   04/18/19 59.6 kg (131 lb 8 oz)   02/10/19 58.2 kg (128 lb 6.4 oz)   12/04/18 57.8 kg (127 lb 6.4 oz)    07/31/18 56.1 kg (123 lb 11.2 oz)       ALLERGIES:No known allergies  PAST MEDICAL HISTORY:   has a past medical history of Anemia, Anxiety, Bipolar affective (H), Dementia, Depressive disorder, Gastro-oesophageal reflux disease, OA (osteoarthritis) of knee, and Renal insufficiencies, chronic.  PAST SURGICAL HISTORY:   has a past surgical history that includes Salpingo-oophorectomy bilateral; Hysterectomy; and Eye surgery.  FAMILY HISTORY: family history includes C.A.D. in her brother, father, mother, and sister; Psychotic Disorder in her son.  SOCIAL HISTORY:  reports that she has never smoked. She has never used smokeless tobacco. She reports that she does not drink alcohol or use drugs.    MEDICATIONS:  Current Outpatient Medications   Medication Sig Dispense Refill     acetaminophen (TYLENOL) 500 MG tablet Take 500 mg by mouth 2 times daily and 1 tab every 4 hours as needed       donepezil (ARICEPT) 5 MG tablet Take 5 mg by mouth 2 times daily  30 tablet 0     ferrous sulfate (IRON) 325 (65 Fe) MG tablet Take 325 mg by mouth daily every other day       lamoTRIgine (LAMICTAL) 150 MG tablet Take 200 mg by mouth At Bedtime        melatonin 5 MG tablet Take 5 mg by mouth At Bedtime       mineral oil OIL Place 2 drops into both ears daily       mirtazapine (REMERON) 15 MG tablet Take 30 mg by mouth At Bedtime        Multiple Vitamins-Minerals (OCUVITE ADULT FORMULA) CAPS Take 1 tablet by mouth daily        ondansetron (ZOFRAN) 4 MG tablet Take 4 mg by mouth 4 times daily        pantoprazole (PROTONIX) 40 MG EC tablet Take 40 mg by mouth daily        polyethylene glycol 3350 POWD Take 17 g by mouth daily as needed       prazosin (MINIPRESS) 1 MG capsule Take 1 mg by mouth At Bedtime       QUEtiapine (SEROQUEL) 50 MG tablet Take 150 mg by mouth At Bedtime        senna-docusate (SENNA S) 8.6-50 MG per tablet Take 2 tablets by mouth At Bedtime       sertraline (ZOLOFT) 50 MG tablet Take 150 mg by mouth daily     "    Case Management:  I have reviewed the care plan and MDS and do agree with the plan. Patient's desire to return to the community is present, but is not able due to care needs . Information reviewed:  Medications, vital signs, orders, and nursing notes.    ROS:  5 point ROS of systems including Respiratory, Cardiovascular, Gastroenterology, Genitourinary, Psychiatric were all negative except for pertinent positives noted in my HPI.    Vitals:  /81   Pulse 73   Temp 98.9  F (37.2  C)   Resp 18   Ht 1.575 m (5' 2\")   Wt 59.6 kg (131 lb 8 oz)   SpO2 97%   BMI 24.05 kg/m     Body mass index is 24.05 kg/m .  Exam:  GENERAL APPEARANCE:  Alert, pleasant and cooperative, lying in bed on exam today  HEENT: normocephalic, moist mucous membranes, [place]    RESP:  respiratory effort normal, no respiratory distress, Lung sounds clear, patient is on RA  CV: auscultation of heart done, rate and rhythm regular. No peripheral edema   ABDOMEN:  + bowel sounds, soft, tender with palpation throughout abdomen  M/S: no tenderness or swelling of the joints; able to move all extremities   NEURO: cranial nerves 2-12 grossly intact, no facial asymmetry, no speech deficits and able to follow directions, moves all extremities symmetrically  PSYCH: normal insight and judgement, memory baeline, affect flat and mood down      Lab/Diagnostic data:   Recent labs in [facility] reviewed by me today.     ASSESSMENT/PLAN  (F31.9) Bipolar affective disorder, remission status unspecified (H)  (primary encounter diagnosis)  (G31.83,  F02.80) Lewy body dementia without behavioral disturbance  Comment: Chronic, reports mood is down today, has been spending a lot of time in bed lately per nursing documentation, also reports nightmares nearly every night  Plan: Continue donepezil, lamotrigine, mitrazapine, prazosin, seroquel, sertraline. Psychiatry in house to continue to follow patient.     (R10.84) Abdominal pain, generalized  Comment: Chronic, per " chart review- bowels moving well, no recent emesis, with history of GERD, duodenitis/ Schatzki's RIng s/p dilation in July  Plan: Continue protonix, zofran scheduled and PRN, senna s scheduled and PRN. Encourage patient to take tylenol scheduled and PRN. Continue to monitor. Consider hgb recheck at next BMP check    (N18.4) CKD (chronic kidney disease) stage 4, GFR 15-29 ml/min (H)  Comment: Chronic, creatinine slightly above baseline at last check- etiology of CKD not clear  Creatinine   Date Value Ref Range Status   02/08/2019 2.95 (A) 0.55 - 1.02 mg/dL Final   -Baseline creatinine 2.7-2.8  Plan: Avoid nephrotoxins. BMP q6 months- due next in August.       Electronically signed by:  ASCENCION Ken CNP        Sincerely,        ASCENCION Ken CNP

## 2019-05-15 ENCOUNTER — TRANSFERRED RECORDS (OUTPATIENT)
Dept: HEALTH INFORMATION MANAGEMENT | Facility: CLINIC | Age: 84
End: 2019-05-15

## 2019-05-15 LAB
ANION GAP SERPL CALCULATED.3IONS-SCNC: 10 MMOL/L (ref 7–16)
BUN SERPL-MCNC: 37 MG/DL (ref 7–26)
CALCIUM SERPL-MCNC: 9.1 MG/DL (ref 8.4–10.4)
CHLORIDE SERPLBLD-SCNC: 110 MMOL/L (ref 98–109)
CO2 SERPL-SCNC: 24 MMOL/L (ref 20–29)
CREAT SERPL-MCNC: 2.53 MG/DL (ref 0.55–1.02)
GFR SERPL CREATININE-BSD FRML MDRD: 16 ML/MIN/1.73M2
GLUCOSE SERPL-MCNC: 122 MG/DL (ref 70–100)
POTASSIUM SERPL-SCNC: 4.3 MMOL/L (ref 3.5–5.1)
SODIUM SERPL-SCNC: 144 MMOL/L (ref 136–145)

## 2019-06-03 NOTE — PROGRESS NOTES
[place] [facility]  Nursing Home Regulatory Visit  June 4, 2019    Chief Complaint   Patient presents with     alf Regulatory       HPI:    Maribel Sevilla is a 87 year old  (12/20/1931), who is being seen today for a federally mandated E/M visit at [facility]. Today's concerns are:    1) Bipolar Disorder / Lewy Body Dementia -- Follows with psychiatry. Ongoing cyclical pattern to her mood. No longer on prazosin since I last saw her. Managed with donepezil 5 mg BID,  lamotrigine 200 mg qhs, mirtazapine 30 mg qhs, quetiapine 150 mg qhs and sertraline 150 mg daily  2) CKD, Stage IV -- Baseline Cr 2.7-2.8 range. Cr 2.53 on 5/15/19. Unclear etiology. No peripheral edema.   3) Fe Deficiency Anemia -- Baseline Hgb difficult to ascertain - most recently around 8. Most recent Hgb was 9.1 on 8/10/19. She is on FeSO4    ALLERGIES: No known allergies    PAST MEDICAL HISTORY:   Past Medical History:   Diagnosis Date     Anemia      Anxiety      Bipolar affective (H)      Dementia     LewyBody Dementia Sept 2011     Depressive disorder      Gastro-oesophageal reflux disease      OA (osteoarthritis) of knee      Renal insufficiencies, chronic        PAST SURGICAL HISTORY:   Past Surgical History:   Procedure Laterality Date     EYE SURGERY      cataract surgery bilaterally     HYSTERECTOMY       SALPINGO-OOPHORECTOMY BILATERAL         FAMILY HISTORY:   Family History   Problem Relation Age of Onset     C.A.D. Mother         ?     C.A.D. Father         ?     C.A.D. Sister         ?     C.A.D. Brother         ?     Psychotic Disorder Son         suicide       SOCIAL HISTORY:   Lives in a SNF    MEDICATIONS:  Current Outpatient Medications   Medication Sig Dispense Refill     acetaminophen (TYLENOL) 500 MG tablet Take 500 mg by mouth 2 times daily and 1 tab every 4 hours as needed       donepezil (ARICEPT) 5 MG tablet Take 5 mg by mouth 2 times daily  30 tablet 0     ferrous sulfate (IRON) 325 (65 Fe) MG  "tablet Take 325 mg by mouth daily every other day       lamoTRIgine (LAMICTAL) 150 MG tablet Take 200 mg by mouth At Bedtime        melatonin 5 MG tablet Take 5 mg by mouth At Bedtime       mineral oil OIL Place 2 drops into both ears daily       mirtazapine (REMERON) 15 MG tablet Take 30 mg by mouth At Bedtime        Multiple Vitamins-Minerals (PRESERVISION AREDS 2 PO) Take 1 tablet by mouth daily       ondansetron (ZOFRAN) 4 MG tablet Take 4 mg by mouth 4 times daily        pantoprazole (PROTONIX) 40 MG EC tablet Take 40 mg by mouth daily        polyethylene glycol 3350 POWD Take 17 g by mouth daily as needed       QUEtiapine (SEROQUEL) 50 MG tablet Take 150 mg by mouth At Bedtime        senna-docusate (SENNA S) 8.6-50 MG per tablet Take 2 tablets by mouth At Bedtime       sertraline (ZOLOFT) 50 MG tablet Take 200 mg by mouth daily          Medications reviewed:  Medications reconciled to facility chart and changes were made to reflect current medications as identified as above med list. Below are the changes that were made:   Medications stopped since last EPIC medication reconciliation:   Medications Discontinued During This Encounter   Medication Reason     Multiple Vitamins-Minerals (OCUVITE ADULT FORMULA) CAPS      prazosin (MINIPRESS) 1 MG capsule      Medications started since last [facility] medication reconciliation:  Orders Placed This Encounter   Medications     Multiple Vitamins-Minerals (PRESERVISION AREDS 2 PO)     Sig: Take 1 tablet by mouth daily       Case Management:  I have reviewed the care plan and MDS and do agree with the plan.   Information reviewed:  Medications, vital signs, orders, and nursing notes.    ROS:  Unable to be obtained due to cognitive impairment or aphasia.     PHYSICAL EXAM:  /65   Pulse 76   Temp 97.9  F (36.6  C)   Resp 18   Ht 1.575 m (5' 2\")   Wt 60.1 kg (132 lb 9.6 oz)   SpO2 96%   BMI 24.25 kg/m     Gen: laying in bed, alert and in no acute distress  Resp: " breathing non labored  Ext: no LE edema  Neuro: CX II-XII grossly in tact; ROM in all four extremities grossly in tact  Psych: memory, judgement and insight impaired    Lab/Diagnostic data:  Reviewed as per Epic    ASSESSMENT/PLAN    1) Bipolar Disorder / Lewy Body Dementia   Follows with psychiatry. Ongoing cyclical pattern to her mood. No longer on prazosin since I last saw her.   -- continues on donepezil 5 mg BID,  lamotrigine 200 mg qhs, mirtazapine 30 mg qhs, quetiapine 150 mg qhs and sertraline 150 mg daily  -- ongoing supportive cares  -- appreciate expertise/management of psychiatry    2) CKD, Stage IV   Baseline Cr 2.7-2.8 range. Cr 2.53 on 5/15/19. Unclear etiology. No peripheral edema.   -- avoid nephrotoxic agents  -- BMP q6 mo for now    3) Fe Deficiency Anemia   Baseline Hgb difficult to ascertain - most recently around 8. Most recent Hgb was 9.1 on 8/10/19.   -- continues on FeSO4 325 mg daily  -- would get an Hgb and Fe sat % with next lab draw       Electronically signed by:  Felecia Elizabeth MD

## 2019-06-04 ENCOUNTER — NURSING HOME VISIT (OUTPATIENT)
Dept: GERIATRICS | Facility: CLINIC | Age: 84
End: 2019-06-04
Payer: COMMERCIAL

## 2019-06-04 VITALS
BODY MASS INDEX: 24.4 KG/M2 | WEIGHT: 132.6 LBS | RESPIRATION RATE: 18 BRPM | DIASTOLIC BLOOD PRESSURE: 65 MMHG | TEMPERATURE: 97.9 F | SYSTOLIC BLOOD PRESSURE: 122 MMHG | HEIGHT: 62 IN | OXYGEN SATURATION: 96 % | HEART RATE: 76 BPM

## 2019-06-04 DIAGNOSIS — F02.80 LEWY BODY DEMENTIA WITHOUT BEHAVIORAL DISTURBANCE (H): Primary | ICD-10-CM

## 2019-06-04 DIAGNOSIS — G31.83 LEWY BODY DEMENTIA WITHOUT BEHAVIORAL DISTURBANCE (H): Primary | ICD-10-CM

## 2019-06-04 DIAGNOSIS — D50.9 IRON DEFICIENCY ANEMIA, UNSPECIFIED IRON DEFICIENCY ANEMIA TYPE: ICD-10-CM

## 2019-06-04 DIAGNOSIS — F31.9 BIPOLAR AFFECTIVE DISORDER, REMISSION STATUS UNSPECIFIED (H): ICD-10-CM

## 2019-06-04 DIAGNOSIS — N18.4 CKD (CHRONIC KIDNEY DISEASE) STAGE 4, GFR 15-29 ML/MIN (H): ICD-10-CM

## 2019-06-04 PROCEDURE — 99309 SBSQ NF CARE MODERATE MDM 30: CPT | Performed by: INTERNAL MEDICINE

## 2019-06-04 ASSESSMENT — MIFFLIN-ST. JEOR: SCORE: 989.72

## 2019-06-04 NOTE — LETTER
6/4/2019        RE: Maribel Sevilla  [address] [address]        [place] [facility]  Nursing Home Regulatory Visit  June 4, 2019    Chief Complaint   Patient presents with     jail Regulatory       HPI:    Maribel Sevilla is a 87 year old  (12/20/1931), who is being seen today for a federally mandated E/M visit at [facility]. Today's concerns are:    1) Bipolar Disorder / Lewy Body Dementia -- Follows with psychiatry. Ongoing cyclical pattern to her mood. No longer on prazosin since I last saw her. Managed with donepezil 5 mg BID,  lamotrigine 200 mg qhs, mirtazapine 30 mg qhs, quetiapine 150 mg qhs and sertraline 150 mg daily  2) CKD, Stage IV -- Baseline Cr 2.7-2.8 range. Cr 2.53 on 5/15/19. Unclear etiology. No peripheral edema.   3) Fe Deficiency Anemia -- Baseline Hgb difficult to ascertain - most recently around 8. Most recent Hgb was 9.1 on 8/10/19. She is on FeSO4    ALLERGIES: No known allergies    PAST MEDICAL HISTORY:   Past Medical History:   Diagnosis Date     Anemia      Anxiety      Bipolar affective (H)      Dementia     LewyBody Dementia Sept 2011     Depressive disorder      Gastro-oesophageal reflux disease      OA (osteoarthritis) of knee      Renal insufficiencies, chronic        PAST SURGICAL HISTORY:   Past Surgical History:   Procedure Laterality Date     EYE SURGERY      cataract surgery bilaterally     HYSTERECTOMY       SALPINGO-OOPHORECTOMY BILATERAL         FAMILY HISTORY:   Family History   Problem Relation Age of Onset     C.A.D. Mother         ?     C.A.D. Father         ?     C.A.D. Sister         ?     C.A.D. Brother         ?     Psychotic Disorder Son         suicide       SOCIAL HISTORY:   Lives in a SNF    MEDICATIONS:  Current Outpatient Medications   Medication Sig Dispense Refill     acetaminophen (TYLENOL) 500 MG tablet Take 500 mg by mouth 2 times daily and 1 tab every 4 hours as needed       donepezil  "(ARICEPT) 5 MG tablet Take 5 mg by mouth 2 times daily  30 tablet 0     ferrous sulfate (IRON) 325 (65 Fe) MG tablet Take 325 mg by mouth daily every other day       lamoTRIgine (LAMICTAL) 150 MG tablet Take 200 mg by mouth At Bedtime        melatonin 5 MG tablet Take 5 mg by mouth At Bedtime       mineral oil OIL Place 2 drops into both ears daily       mirtazapine (REMERON) 15 MG tablet Take 30 mg by mouth At Bedtime        Multiple Vitamins-Minerals (PRESERVISION AREDS 2 PO) Take 1 tablet by mouth daily       ondansetron (ZOFRAN) 4 MG tablet Take 4 mg by mouth 4 times daily        pantoprazole (PROTONIX) 40 MG EC tablet Take 40 mg by mouth daily        polyethylene glycol 3350 POWD Take 17 g by mouth daily as needed       QUEtiapine (SEROQUEL) 50 MG tablet Take 150 mg by mouth At Bedtime        senna-docusate (SENNA S) 8.6-50 MG per tablet Take 2 tablets by mouth At Bedtime       sertraline (ZOLOFT) 50 MG tablet Take 200 mg by mouth daily          Medications reviewed:  Medications reconciled to facility chart and changes were made to reflect current medications as identified as above med list. Below are the changes that were made:   Medications stopped since last EPIC medication reconciliation:   Medications Discontinued During This Encounter   Medication Reason     Multiple Vitamins-Minerals (OCUVITE ADULT FORMULA) CAPS      prazosin (MINIPRESS) 1 MG capsule      Medications started since last [facility] medication reconciliation:  Orders Placed This Encounter   Medications     Multiple Vitamins-Minerals (PRESERVISION AREDS 2 PO)     Sig: Take 1 tablet by mouth daily       Case Management:  I have reviewed the care plan and MDS and do agree with the plan.   Information reviewed:  Medications, vital signs, orders, and nursing notes.    ROS:  Unable to be obtained due to cognitive impairment or aphasia.     PHYSICAL EXAM:  /65   Pulse 76   Temp 97.9  F (36.6  C)   Resp 18   Ht 1.575 m (5' 2\")   Wt 60.1 kg " (132 lb 9.6 oz)   SpO2 96%   BMI 24.25 kg/m      Gen: laying in bed, alert and in no acute distress  Resp: breathing non labored  Ext: no LE edema  Neuro: CX II-XII grossly in tact; ROM in all four extremities grossly in tact  Psych: memory, judgement and insight impaired    Lab/Diagnostic data:  Reviewed as per Epic    ASSESSMENT/PLAN    1) Bipolar Disorder / Lewy Body Dementia   Follows with psychiatry. Ongoing cyclical pattern to her mood. No longer on prazosin since I last saw her.   -- continues on donepezil 5 mg BID,  lamotrigine 200 mg qhs, mirtazapine 30 mg qhs, quetiapine 150 mg qhs and sertraline 150 mg daily  -- ongoing supportive cares  -- appreciate expertise/management of psychiatry    2) CKD, Stage IV   Baseline Cr 2.7-2.8 range. Cr 2.53 on 5/15/19. Unclear etiology. No peripheral edema.   -- avoid nephrotoxic agents  -- BMP q6 mo for now    3) Fe Deficiency Anemia   Baseline Hgb difficult to ascertain - most recently around 8. Most recent Hgb was 9.1 on 8/10/19.   -- continues on FeSO4 325 mg daily  -- would get an Hgb and Fe sat % with next lab draw       Electronically signed by:  Felecia Elizabeth MD        Sincerely,        Felecia Elizabeth MD

## 2019-07-10 ENCOUNTER — CLINICAL UPDATE (OUTPATIENT)
Dept: PHARMACY | Facility: CLINIC | Age: 84
End: 2019-07-10

## 2019-07-10 NOTE — PROGRESS NOTES
This patient's medication list and chart were reviewed as part of the service provided by [facility].    Assessment/Recommendations:  1. (Lewy Body Dementia):  Please discuss with psychiatry if ok to begin taper/d'c of Donepezil.  Pt with h/o syncopal episode, which Donepezil may contribute to.  Donepezil may also contribute to the nightmares patient has experienced, as well as pt's urinary incontinence and frequency, gi upset.  Also increases risk of gastric acid secretion, bleeding, and pt with h/o anemia as well.  Please consider reduction to 5mg once daily and monitor cognition, function.  After one month, if pt has tolerated potential reduction, consider d'c altogether.  2. (Anemia):  Agree with MD recommendation to check Hgb and TSAT.  May also consider ferritin.      Jade Wade, Pharm.D.,[facility] [place]  Board Certified Geriatric Pharmacist  Medication Therapy Management Pharmacist  [phone]

## 2019-07-22 PROBLEM — R29.6 RECURRENT FALLS: Status: ACTIVE | Noted: 2019-07-22

## 2019-07-23 ENCOUNTER — NURSING HOME VISIT (OUTPATIENT)
Dept: GERIATRICS | Facility: CLINIC | Age: 84
End: 2019-07-23
Payer: COMMERCIAL

## 2019-07-23 VITALS
OXYGEN SATURATION: 98 % | WEIGHT: 132.2 LBS | SYSTOLIC BLOOD PRESSURE: 125 MMHG | DIASTOLIC BLOOD PRESSURE: 75 MMHG | HEIGHT: 62 IN | TEMPERATURE: 98.9 F | RESPIRATION RATE: 18 BRPM | BODY MASS INDEX: 24.33 KG/M2 | HEART RATE: 73 BPM

## 2019-07-23 DIAGNOSIS — N18.4 CKD (CHRONIC KIDNEY DISEASE) STAGE 4, GFR 15-29 ML/MIN (H): Primary | ICD-10-CM

## 2019-07-23 DIAGNOSIS — R29.6 RECURRENT FALLS: ICD-10-CM

## 2019-07-23 DIAGNOSIS — K59.1 FUNCTIONAL DIARRHEA: ICD-10-CM

## 2019-07-23 DIAGNOSIS — D53.9 DEFICIENCY ANEMIA: ICD-10-CM

## 2019-07-23 DIAGNOSIS — K21.9 GASTROESOPHAGEAL REFLUX DISEASE WITHOUT ESOPHAGITIS: ICD-10-CM

## 2019-07-23 DIAGNOSIS — M17.0 PRIMARY OSTEOARTHRITIS OF BOTH KNEES: ICD-10-CM

## 2019-07-23 DIAGNOSIS — K59.01 CONSTIPATION, SLOW TRANSIT: ICD-10-CM

## 2019-07-23 DIAGNOSIS — R11.15 NON-INTRACTABLE CYCLICAL VOMITING WITHOUT NAUSEA: ICD-10-CM

## 2019-07-23 DIAGNOSIS — R26.9 ABNORMALITY OF GAIT: ICD-10-CM

## 2019-07-23 DIAGNOSIS — G47.01 INSOMNIA DUE TO MEDICAL CONDITION: ICD-10-CM

## 2019-07-23 DIAGNOSIS — Z13.6 HYPERTENSION SCREEN: ICD-10-CM

## 2019-07-23 DIAGNOSIS — F33.2 SEVERE EPISODE OF RECURRENT MAJOR DEPRESSIVE DISORDER, WITHOUT PSYCHOTIC FEATURES (H): ICD-10-CM

## 2019-07-23 DIAGNOSIS — H61.23 BILATERAL IMPACTED CERUMEN: ICD-10-CM

## 2019-07-23 DIAGNOSIS — F02.80 LEWY BODY DEMENTIA WITHOUT BEHAVIORAL DISTURBANCE (H): ICD-10-CM

## 2019-07-23 DIAGNOSIS — F41.1 ANXIETY STATE: ICD-10-CM

## 2019-07-23 DIAGNOSIS — G31.83 LEWY BODY DEMENTIA WITHOUT BEHAVIORAL DISTURBANCE (H): ICD-10-CM

## 2019-07-23 DIAGNOSIS — F31.9 BIPOLAR AFFECTIVE DISORDER, REMISSION STATUS UNSPECIFIED (H): ICD-10-CM

## 2019-07-23 PROCEDURE — 99318 ZZC ANNUAL NURSING FAC ASSESSMNT, STABLE: CPT | Performed by: NURSE PRACTITIONER

## 2019-07-23 ASSESSMENT — MIFFLIN-ST. JEOR: SCORE: 987.91

## 2019-07-23 NOTE — PROGRESS NOTES
"[facility] [facility]  Chief Complaint   Patient presents with     Annual Comprehensive Nursing Home     [place] Medical Record Number:  [id number]  Place of Service where encounter took place:  [facility] ([facility]) [[id number]]    HPI:    Maribel Sevilla  is a 87 year old  (12/20/1931), who is being seen today for an annual comprehensive visit. HPI information obtained from: facility chart records, facility staff, patient report and [facility] chart review.  Today's concerns are:     CKD (chronic kidney disease) stage 4, GFR 15-29 ml/min (H)  Severe episode of recurrent major depressive disorder, without psychotic features (H)  Bipolar affective disorder, remission status unspecified (H)  Lewy body dementia without behavioral disturbance  Anxiety state  Insomnia due to medical condition  Abnormality of gait  Recurrent falls  Bilateral impacted cerumen  Constipation, slow transit  Functional diarrhea  Gastroesophageal reflux disease without esophagitis  Non-intractable cyclical vomiting without nausea  Primary osteoarthritis of both knees  Deficiency anemia  CARDIOVASCULAR SCREENING; LDL GOAL LESS THAN 160     Resident seen today for annual examination in LTC facility. She is seen today resting in bed - she is having a \"down day\". She notes this has been going on for \"a day or so\". She doesn't have much of an appetite and has been spending most of her time in bed - I speak to nursing and request they update psychiatry if this continues past the end of the week. She tells me everything else is fine and thanks me for my visit today. She has no other notable concerns. Denies CP, palpitations, fatigue, nausea, vomiting, increased SOB/DUENAS, fever, chills, and/or b/b concerns today.    ALLERGIES: No known allergies  PAST MEDICAL HISTORY:  has a past medical history of Anemia, Anxiety, Bipolar affective (H), Dementia, Depressive disorder, Gastro-oesophageal reflux disease, OA (osteoarthritis) of knee, " and Renal insufficiencies, chronic.  PAST SURGICAL HISTORY:  has a past surgical history that includes Salpingo-oophorectomy bilateral; Hysterectomy; and Eye surgery.  IMMUNIZATIONS:  Immunization History   Administered Date(s) Administered     Influenza (IIV3) PF 10/22/2013, 10/30/2014, 10/05/2015, 10/18/2016, 10/02/2017, 09/28/2018     Mantoux Tuberculin Skin Test 08/20/2011     Pneumo Conj 13-V (2010&after) 10/05/2015     Pneumococcal 23 valent 08/20/2011, 10/23/2013     TD (ADULT, 7+) 10/29/2011, 10/30/2011, 03/15/2017     Above immunizations pulled from [employer]. MIIC and facility records also reconciled. Outstanding information sent to  to update [employer].  Future immunizations are not needed at this point as all recommended immunizations are up to date.     Current Outpatient Medications   Medication Sig Dispense Refill     acetaminophen (TYLENOL) 500 MG tablet Take 500 mg by mouth 2 times daily and 1 tab every 4 hours as needed       donepezil (ARICEPT) 5 MG tablet Take 5 mg by mouth 2 times daily  30 tablet 0     ferrous sulfate (IRON) 325 (65 Fe) MG tablet Take 325 mg by mouth daily every other day       lamoTRIgine (LAMICTAL) 150 MG tablet Take 200 mg by mouth At Bedtime        melatonin 5 MG tablet Take 5 mg by mouth At Bedtime       mineral oil OIL Place 2 drops into both ears daily       mirtazapine (REMERON) 15 MG tablet Take 30 mg by mouth At Bedtime        Multiple Vitamins-Minerals (PRESERVISION AREDS 2 PO) Take 1 tablet by mouth daily       ondansetron (ZOFRAN) 4 MG tablet Take 4 mg by mouth 4 times daily        pantoprazole (PROTONIX) 40 MG EC tablet Take 40 mg by mouth daily        QUEtiapine (SEROQUEL) 50 MG tablet Take 150 mg by mouth At Bedtime        senna-docusate (SENNA S) 8.6-50 MG per tablet Take 2 tablets by mouth At Bedtime       sertraline (ZOLOFT) 50 MG tablet Take 200 mg by mouth daily        polyethylene glycol 3350 POWD Take 17 g by mouth daily as needed   "     Case Management:  I have reviewed the facility/SNF care plan/MDS, including the falls risk, nutrition and pain screening. I also reviewed the current immunizations, and preventive care. .Future cancer screening is not clinically indicated secondary to age/goals of care Patient's desire to return to the community is present, but is not able due to care needs . Current Level of Care is appropriate.    Advance Directive Discussion:    I reviewed the current advanced directives as reflected in EPIC, the POLST and the facility chart, and verified the congruency of orders. I contacted the first party, daughter Mari and left a message regarding the plan of Care.  I did review the advance directives with the resident.     Team Discussion:  I communicated with the appropriate disciplines involved with the Plan of Care:   Nursing    Patient's goal is to \"just rest\".  Information reviewed:  Medications, vital signs, orders, and nursing notes.    ROS:  10 point ROS of systems including Constitutional, Eyes, Respiratory, Cardiovascular, Gastroenterology, Genitourinary, Integumentary, Musculoskeletal, Psychiatric were all negative except for pertinent positives noted in my HPI.    Vitals:  /75   Pulse 73   Temp 98.9  F (37.2  C)   Resp 18   Ht 1.575 m (5' 2\")   Wt 60 kg (132 lb 3.2 oz)   SpO2 98%   BMI 24.18 kg/m   Body mass index is 24.18 kg/m .  Exam:  GENERAL APPEARANCE:  Alert, in no distress, oriented, cooperative elderly woman resting in bed during the middle of the day in her [facility]  ENT:  Mouth and posterior oropharynx normal, moist mucous membranes, [place]  EYES:  EOM, conjunctivae, lids, pupils and irises normal  NECK:  No adenopathy, masses or thyromegaly  RESP:  Respiratory effort and palpation of chest normal, lungs clear to auscultation, no respiratory distress  CV:  Palpation and auscultation of heart done, regular rate and rhythm, no murmur, rub, or gallop, no edema, +2 pedal pulses  ABDOMEN: " Active bowel sounds x4, soft, nontender, no hepatosplenomegaly or other masses  M/S:   Gait and station normal; Digits and nails abnormal - arthritic changes present  NEURO:   Cranial nerves 2-12 are normal tested and grossly at patient's baseline  PSYCH:  oriented X 3, normal insight, judgement and memory, affect and mood Pleasant, down; tired.    Lab/Diagnostic data:   Recent labs in [facility] reviewed by me today.     ASSESSMENT/PLAN  (N18.4) CKD (chronic kidney disease) stage 4, GFR 15-29 ml/min (H)  (primary encounter diagnosis)  Comment: Chronic - denies concerns with voiding.   Plan: Stable without medical intervention. CMP. Avoid nephrotoxic agents.     (F33.2) Severe episode of recurrent major depressive disorder, without psychotic features (H)  (F31.9) Bipolar affective disorder, remission status unspecified (H)  (G31.83,  F02.80) Lewy body dementia without behavioral disturbance  (F41.1) Anxiety state  (G47.01) Insomnia due to medical condition  Comment: Chronic - managed by in-house psych team. Current state as noted above. Managed on regimen of Aricept, Lamictal, Melatonin, Seroquel and Zoloft.  Plan: Continue medications as ordered; update psych as needed. CMP.    (R26.9) Abnormality of gait  (R29.6) Recurrent falls  Comment: Patient with numerous falls recently, most of which happen when she goes on day outings with her family - no major associated injuries.  Plan: Educated patient on safety with movement and to take it slow. If falls continue to occur will involve therapies.    (H61.23) Bilateral impacted cerumen  Comment: Occasionally noted over the past year - on at bedtime mineral oil to prevent buildup  Plan: Stable on current regimen; continue medications as currently ordered.    (K59.01) Constipation, slow transit  Comment: Chronic - varying with episodes of diarrhea. Managed on regimen of senna-s  Plan: Stable on current regimen; continue medications as currently ordered.    (K59.1) Functional  diarrhea  Comment: Variable with constipation; however, we were able to discontinue PRN Imodium recently 2/2 non-use  Plan: Stable without medical intervention. Monitor.     (K21.9) Gastroesophageal reflux disease without esophagitis  Comment: Chronic - no current concerns noted. Managed on regimen of Protonix  Plan: Stable on current regimen; continue medications as currently ordered.    (G43.A0) Non-intractable cyclical vomiting without nausea  Comment: Recurrent - not a current concern; denies presence of nausea today. Does have PRN Zofran available.  Plan: Stable on current regimen; continue medications as currently ordered.    (M17.0) Primary osteoarthritis of both knees  Comment: Chronic - denies pain today. Managed on regimen of Tylenol  Plan: Stable on current regimen; continue medications as currently ordered.    (D53.9) Deficiency anemia  Comment: Chronic - denies increased fatigue or dizziness today. VSS. On daily iron replacement.   Plan: Stable on current regimen; continue medications as currently ordered. CBC.    (Z13.6) HTN Screen   Comment: Annually. No active tx regimen  Last BPs: 125/75, 115/60, 113/67  Plan: Stable without medical intervention. Based on JNC-8 goals,  patients age of 87 year old, presence of diabetes or CKD, and goals of care goal BP is <150/90 mm Hg. Patient is stable and continue without pharmacological invention with routine assessment.     Orders written by provider at facility  -CBC  -CMP    Electronically signed by:  Dr. Bri Wiley, APRN, [facility]  Phone: [phone]  Fax: [phone]

## 2019-07-23 NOTE — LETTER
"    7/23/2019        RE: Maribel Sevilla  [facility]  [address] [facility] [address]        [place] [facility]  Chief Complaint   Patient presents with     Annual Comprehensive Nursing Home     [place] Medical Record Number:  [id number]  Place of Service where encounter took place:  [facility] ([facility]) [[id number]]    HPI:    Maribel Sevilla  is a 87 year old  (12/20/1931), who is being seen today for an annual comprehensive visit. HPI information obtained from: facility chart records, facility staff, patient report and [facility] chart review.  Today's concerns are:     CKD (chronic kidney disease) stage 4, GFR 15-29 ml/min (H)  Severe episode of recurrent major depressive disorder, without psychotic features (H)  Bipolar affective disorder, remission status unspecified (H)  Lewy body dementia without behavioral disturbance  Anxiety state  Insomnia due to medical condition  Abnormality of gait  Recurrent falls  Bilateral impacted cerumen  Constipation, slow transit  Functional diarrhea  Gastroesophageal reflux disease without esophagitis  Non-intractable cyclical vomiting without nausea  Primary osteoarthritis of both knees  Deficiency anemia  CARDIOVASCULAR SCREENING; LDL GOAL LESS THAN 160     Resident seen today for annual examination in LTC facility. She is seen today resting in bed - she is having a \"down day\". She notes this has been going on for \"a day or so\". She doesn't have much of an appetite and has been spending most of her time in bed - I speak to nursing and request they update psychiatry if this continues past the end of the week. She tells me everything else is fine and thanks me for my visit today. She has no other notable concerns. Denies CP, palpitations, fatigue, nausea, vomiting, increased SOB/DUENAS, fever, chills, and/or b/b concerns today.    ALLERGIES: No known allergies  PAST MEDICAL HISTORY:  has a past medical history of Anemia, Anxiety, Bipolar " affective (H), Dementia, Depressive disorder, Gastro-oesophageal reflux disease, OA (osteoarthritis) of knee, and Renal insufficiencies, chronic.  PAST SURGICAL HISTORY:  has a past surgical history that includes Salpingo-oophorectomy bilateral; Hysterectomy; and Eye surgery.  IMMUNIZATIONS:  Immunization History   Administered Date(s) Administered     Influenza (IIV3) PF 10/22/2013, 10/30/2014, 10/05/2015, 10/18/2016, 10/02/2017, 09/28/2018     Mantoux Tuberculin Skin Test 08/20/2011     Pneumo Conj 13-V (2010&after) 10/05/2015     Pneumococcal 23 valent 08/20/2011, 10/23/2013     TD (ADULT, 7+) 10/29/2011, 10/30/2011, 03/15/2017     Above immunizations pulled from [employer]. MIIC and facility records also reconciled. Outstanding information sent to  to update [employer].  Future immunizations are not needed at this point as all recommended immunizations are up to date.     Current Outpatient Medications   Medication Sig Dispense Refill     acetaminophen (TYLENOL) 500 MG tablet Take 500 mg by mouth 2 times daily and 1 tab every 4 hours as needed       donepezil (ARICEPT) 5 MG tablet Take 5 mg by mouth 2 times daily  30 tablet 0     ferrous sulfate (IRON) 325 (65 Fe) MG tablet Take 325 mg by mouth daily every other day       lamoTRIgine (LAMICTAL) 150 MG tablet Take 200 mg by mouth At Bedtime        melatonin 5 MG tablet Take 5 mg by mouth At Bedtime       mineral oil OIL Place 2 drops into both ears daily       mirtazapine (REMERON) 15 MG tablet Take 30 mg by mouth At Bedtime        Multiple Vitamins-Minerals (PRESERVISION AREDS 2 PO) Take 1 tablet by mouth daily       ondansetron (ZOFRAN) 4 MG tablet Take 4 mg by mouth 4 times daily        pantoprazole (PROTONIX) 40 MG EC tablet Take 40 mg by mouth daily        QUEtiapine (SEROQUEL) 50 MG tablet Take 150 mg by mouth At Bedtime        senna-docusate (SENNA S) 8.6-50 MG per tablet Take 2 tablets by mouth At Bedtime       sertraline (ZOLOFT) 50  "MG tablet Take 200 mg by mouth daily        polyethylene glycol 3350 POWD Take 17 g by mouth daily as needed       Case Management:  I have reviewed the facility/SNF care plan/MDS, including the falls risk, nutrition and pain screening. I also reviewed the current immunizations, and preventive care. .Future cancer screening is not clinically indicated secondary to age/goals of care Patient's desire to return to the community is present, but is not able due to care needs . Current Level of Care is appropriate.    Advance Directive Discussion:    I reviewed the current advanced directives as reflected in EPIC, the POLST and the facility chart, and verified the congruency of orders. I contacted the first party, daughter Mari and left a message regarding the plan of Care.  I did review the advance directives with the resident.     Team Discussion:  I communicated with the appropriate disciplines involved with the Plan of Care:   Nursing    Patient's goal is to \"just rest\".  Information reviewed:  Medications, vital signs, orders, and nursing notes.    ROS:  10 point ROS of systems including Constitutional, Eyes, Respiratory, Cardiovascular, Gastroenterology, Genitourinary, Integumentary, Musculoskeletal, Psychiatric were all negative except for pertinent positives noted in my HPI.    Vitals:  /75   Pulse 73   Temp 98.9  F (37.2  C)   Resp 18   Ht 1.575 m (5' 2\")   Wt 60 kg (132 lb 3.2 oz)   SpO2 98%   BMI 24.18 kg/m    Body mass index is 24.18 kg/m .  Exam:  GENERAL APPEARANCE:  Alert, in no distress, oriented, cooperative elderly woman resting in bed during the middle of the day in her [facility]  ENT:  Mouth and posterior oropharynx normal, moist mucous membranes, [place]  EYES:  EOM, conjunctivae, lids, pupils and irises normal  NECK:  No adenopathy, masses or thyromegaly  RESP:  Respiratory effort and palpation of chest normal, lungs clear to auscultation, no respiratory distress  CV:  Palpation and " auscultation of heart done, regular rate and rhythm, no murmur, rub, or gallop, no edema, +2 pedal pulses  ABDOMEN: Active bowel sounds x4, soft, nontender, no hepatosplenomegaly or other masses  M/S:   Gait and station normal; Digits and nails abnormal - arthritic changes present  NEURO:   Cranial nerves 2-12 are normal tested and grossly at patient's baseline  PSYCH:  oriented X 3, normal insight, judgement and memory, affect and mood Pleasant, down; tired.    Lab/Diagnostic data:   Recent labs in [facility] reviewed by me today.     ASSESSMENT/PLAN  (N18.4) CKD (chronic kidney disease) stage 4, GFR 15-29 ml/min (H)  (primary encounter diagnosis)  Comment: Chronic - denies concerns with voiding.   Plan: Stable without medical intervention. CMP. Avoid nephrotoxic agents.     (F33.2) Severe episode of recurrent major depressive disorder, without psychotic features (H)  (F31.9) Bipolar affective disorder, remission status unspecified (H)  (G31.83,  F02.80) Lewy body dementia without behavioral disturbance  (F41.1) Anxiety state  (G47.01) Insomnia due to medical condition  Comment: Chronic - managed by in-house psych team. Current state as noted above. Managed on regimen of Aricept, Lamictal, Melatonin, Seroquel and Zoloft.  Plan: Continue medications as ordered; update psych as needed. CMP.    (R26.9) Abnormality of gait  (R29.6) Recurrent falls  Comment: Patient with numerous falls recently, most of which happen when she goes on day outings with her family - no major associated injuries.  Plan: Educated patient on safety with movement and to take it slow. If falls continue to occur will involve therapies.    (H61.23) Bilateral impacted cerumen  Comment: Occasionally noted over the past year - on at bedtime mineral oil to prevent buildup  Plan: Stable on current regimen; continue medications as currently ordered.    (K59.01) Constipation, slow transit  Comment: Chronic - varying with episodes of diarrhea. Managed on  regimen of senna-s  Plan: Stable on current regimen; continue medications as currently ordered.    (K59.1) Functional diarrhea  Comment: Variable with constipation; however, we were able to discontinue PRN Imodium recently 2/2 non-use  Plan: Stable without medical intervention. Monitor.     (K21.9) Gastroesophageal reflux disease without esophagitis  Comment: Chronic - no current concerns noted. Managed on regimen of Protonix  Plan: Stable on current regimen; continue medications as currently ordered.    (G43.A0) Non-intractable cyclical vomiting without nausea  Comment: Recurrent - not a current concern; denies presence of nausea today. Does have PRN Zofran available.  Plan: Stable on current regimen; continue medications as currently ordered.    (M17.0) Primary osteoarthritis of both knees  Comment: Chronic - denies pain today. Managed on regimen of Tylenol  Plan: Stable on current regimen; continue medications as currently ordered.    (D53.9) Deficiency anemia  Comment: Chronic - denies increased fatigue or dizziness today. VSS. On daily iron replacement.   Plan: Stable on current regimen; continue medications as currently ordered. CBC.    (Z13.6) HTN Screen   Comment: Annually. No active tx regimen  Last BPs: 125/75, 115/60, 113/67  Plan: Stable without medical intervention. Based on JNC-8 goals,  patients age of 87 year old, presence of diabetes or CKD, and goals of care goal BP is <150/90 mm Hg. Patient is stable and continue without pharmacological invention with routine assessment.     Orders written by provider at facility  -CBC  -CMP    Electronically signed by:  ASCENCION Mujica, [facility]  Phone: [phone]  Fax: [phone]          Sincerely,        ASCENCION Rm CNP

## 2019-07-24 ENCOUNTER — TRANSFERRED RECORDS (OUTPATIENT)
Dept: HEALTH INFORMATION MANAGEMENT | Facility: CLINIC | Age: 84
End: 2019-07-24

## 2019-07-24 LAB
ALBUMIN SERPL-MCNC: 3.6 G/DL (ref 3.5–5)
ALP SERPL-CCNC: 114 U/L (ref 40–150)
ALT SERPL-CCNC: <10 U/L (ref 0–55)
ANION GAP SERPL CALCULATED.3IONS-SCNC: 9 MMOL/L (ref 7–16)
AST SERPL-CCNC: 16 U/L (ref 10–40)
BILIRUB SERPL-MCNC: 0.2 MG/DL (ref 0.2–1.2)
BUN SERPL-MCNC: 42 MG/DL (ref 7–26)
CALCIUM SERPL-MCNC: 9.6 MG/DL (ref 8.4–10.4)
CHLORIDE SERPLBLD-SCNC: 110 MMOL/L (ref 98–109)
CO2 SERPL-SCNC: 25 MMOL/L (ref 20–29)
CREAT SERPL-MCNC: 2.69 MG/DL (ref 0.55–1.02)
DIFFERENTIAL: ABNORMAL
ERYTHROCYTE [DISTWIDTH] IN BLOOD BY AUTOMATED COUNT: 12.3 % (ref 11.9–15.5)
GFR SERPL CREATININE-BSD FRML MDRD: 15 ML/MIN/1.73M2
GLUCOSE SERPL-MCNC: 136 MG/DL (ref 70–100)
HCT VFR BLD AUTO: 35.2 % (ref 34.9–44.5)
HEMOGLOBIN: 10.9 G/DL (ref 12–15.5)
MCH RBC QN AUTO: 32 PG (ref 27.6–33.3)
MCHC RBC AUTO-ENTMCNC: 31 G/DL (ref 31.5–35.2)
MCV RBC AUTO: 103.2 FL (ref 80–100)
PLATELET # BLD AUTO: 217 X10(9)/L (ref 150–450)
POTASSIUM SERPL-SCNC: 4.4 MMOL/L (ref 3.5–5.1)
PROT SERPL-MCNC: 7 G/DL (ref 6.4–8.3)
RBC # BLD AUTO: 3.41 X10(12)/L (ref 3.9–5.03)
SODIUM SERPL-SCNC: 144 MMOL/L (ref 136–145)
WBC # BLD AUTO: 7.1 X10(9)/L (ref 3.5–10.5)

## 2019-08-15 ENCOUNTER — NURSING HOME VISIT (OUTPATIENT)
Dept: GERIATRICS | Facility: CLINIC | Age: 84
End: 2019-08-15
Payer: COMMERCIAL

## 2019-08-15 ENCOUNTER — TRANSFERRED RECORDS (OUTPATIENT)
Dept: HEALTH INFORMATION MANAGEMENT | Facility: CLINIC | Age: 84
End: 2019-08-15

## 2019-08-15 VITALS
TEMPERATURE: 99.9 F | BODY MASS INDEX: 24.75 KG/M2 | DIASTOLIC BLOOD PRESSURE: 72 MMHG | SYSTOLIC BLOOD PRESSURE: 125 MMHG | HEIGHT: 62 IN | WEIGHT: 134.5 LBS | RESPIRATION RATE: 18 BRPM | OXYGEN SATURATION: 97 % | HEART RATE: 82 BPM

## 2019-08-15 DIAGNOSIS — N18.4 CKD (CHRONIC KIDNEY DISEASE) STAGE 4, GFR 15-29 ML/MIN (H): ICD-10-CM

## 2019-08-15 DIAGNOSIS — F02.80 LEWY BODY DEMENTIA WITHOUT BEHAVIORAL DISTURBANCE (H): ICD-10-CM

## 2019-08-15 DIAGNOSIS — R53.81 MALAISE: Primary | ICD-10-CM

## 2019-08-15 DIAGNOSIS — K21.9 GASTROESOPHAGEAL REFLUX DISEASE WITHOUT ESOPHAGITIS: ICD-10-CM

## 2019-08-15 DIAGNOSIS — F31.9 BIPOLAR AFFECTIVE DISORDER, REMISSION STATUS UNSPECIFIED (H): ICD-10-CM

## 2019-08-15 DIAGNOSIS — D53.9 DEFICIENCY ANEMIA: ICD-10-CM

## 2019-08-15 DIAGNOSIS — G31.83 LEWY BODY DEMENTIA WITHOUT BEHAVIORAL DISTURBANCE (H): ICD-10-CM

## 2019-08-15 PROCEDURE — 99309 SBSQ NF CARE MODERATE MDM 30: CPT | Performed by: NURSE PRACTITIONER

## 2019-08-15 ASSESSMENT — MIFFLIN-ST. JEOR: SCORE: 998.34

## 2019-08-15 NOTE — PROGRESS NOTES
[place] [facility]  Chief Complaint   Patient presents with     intermediate Regulatory     [place] Medical Record Number:  [id number]  Place of Service where encounter took place:  [facility] ([facility]) [[id number]]    HPI:    Maribel Sevilla  is 87 year old (12/20/1931), who is being seen today for a federally mandated E/M visit.  HPI information obtained from: facility chart records, facility staff, patient report and [place] Epic chart review.     Per recent progress notes:  87 year old female with hx of Lewy body dementia and bipolar disorder, sees psych and on multiple meds. Has CKD with baseline Cr 2.7 as well as iron def anemia. Hx of anxiety, insomnia, falls, constipation, HTN, GERD, arthritis.     Today's concerns are:  Malaise  Lewy body dementia without behavioral disturbance  Patient with dementia, forgetful. Today reports she is not feeling well. C/O body aches and note patient had a temp of 99.9 yesterday. She reports no dyspnea but has urinary frequency and urgency. Takes tylenol scheduled and PRN for aches and pains. Sats are 97% on room air.     Gastroesophageal reflux disease without esophagitis  Denies reflux. Continues scheduled Protonix and Zofran PRN.     CKD (chronic kidney disease) stage 4, GFR 15-29 ml/min (H)  Stable renal function last check. Recent SBP range 125-136  Lab Results   Component Value Date    CR 2.69 07/24/2019    CR 2.53 05/15/2019       Deficiency anemia  Patient on MVI, iron QOD.   Lab Results   Component Value Date    HGB 10.9 07/24/2019    HGB 9.1 08/10/2018       Bipolar affective disorder, remission status unspecified (H)  Mood managed with Aricept, Lamictal, Remeron, Zoloft and Seroquel. Sees psych per routine. No new complaints.       ALLERGIES:No known allergies  PAST MEDICAL HISTORY:   has a past medical history of Anemia, Anxiety, Bipolar affective (H), Dementia, Depressive disorder, Gastro-oesophageal reflux disease, OA (osteoarthritis) of  knee, and Renal insufficiencies, chronic.  PAST SURGICAL HISTORY:   has a past surgical history that includes Salpingo-oophorectomy bilateral; Hysterectomy; and Eye surgery.  FAMILY HISTORY: family history includes C.A.D. in her brother, father, mother, and sister; Psychotic Disorder in her son.  SOCIAL HISTORY:  reports that she has never smoked. She has never used smokeless tobacco. She reports that she does not drink alcohol or use drugs.    MEDICATIONS:  Current Outpatient Medications   Medication Sig Dispense Refill     acetaminophen (TYLENOL) 500 MG tablet Take 500 mg by mouth 2 times daily and 1 tab every 4 hours as needed       donepezil (ARICEPT) 5 MG tablet Take 5 mg by mouth 2 times daily  30 tablet 0     ferrous sulfate (IRON) 325 (65 Fe) MG tablet Take 325 mg by mouth daily every other day       lamoTRIgine (LAMICTAL) 150 MG tablet Take 200 mg by mouth At Bedtime        melatonin 5 MG tablet Take 5 mg by mouth At Bedtime       mineral oil OIL Place 2 drops into both ears daily       mirtazapine (REMERON) 15 MG tablet Take 30 mg by mouth At Bedtime        Multiple Vitamins-Minerals (PRESERVISION AREDS 2 PO) Take 1 tablet by mouth daily       ondansetron (ZOFRAN) 4 MG tablet Take 4 mg by mouth 4 times daily        pantoprazole (PROTONIX) 40 MG EC tablet Take 40 mg by mouth daily        QUEtiapine (SEROQUEL) 50 MG tablet Take 150 mg by mouth At Bedtime        senna-docusate (SENNA S) 8.6-50 MG per tablet Take 2 tablets by mouth At Bedtime       sertraline (ZOLOFT) 50 MG tablet Take 200 mg by mouth daily          Case Management:  I have reviewed the care plan and MDS and do agree with the plan. Patient's desire to return to the community is not present. Information reviewed:  Medications, vital signs, orders, and nursing notes.    ROS:  4 point ROS including Respiratory, CV, GI and , other than that noted in the HPI,  is negative    Vitals:  /72   Pulse 82   Temp 99.9  F (37.7  C)   Resp 18    "Ht 1.575 m (5' 2\")   Wt 61 kg (134 lb 8 oz)   SpO2 97%   BMI 24.60 kg/m    Body mass index is 24.6 kg/m .  Exam:  GENERAL APPEARANCE:  Alert, in no distress, pleasant, cooperative, oriented x self, place  EYES:  EOM, lids, pupils and irises normal, sclera clear and conjunctiva normal, no discharge or mattering on lids or lashes noted  ENT:  Mouth normal, moist mucous membranes, nose normal without drainage or crusting, external ears without lesions, hearing acuity intact  RESP:  respiratory effort normal, no chest wall tenderness, no respiratory distress, Lung sounds clear, patient is on room air  CV:  Auscultation of heart done, rate and rhythm controlled and regular, no murmur, no rub or gallop. Edema none bilateral lower extremities.   ABDOMEN:  normal bowel sounds, soft, nontender, no palpable masses.  M/S:   Gait and station walks with assist , no tenderness or swelling of the joints; able to move all extremities  SKIN:  Inspection and palpation of skin and subcutaneous tissue: skin on extremities warm, dry and intact without rashes  NEURO: cranial nerves 2-12 grossly intact, no facial asymmetry, no speech deficits and able to follow directions, moves all extremities symmetrically  PSYCH:  insight and judgement and memory impaired, affect and mood flat    Lab/Diagnostic data:     Most Recent 3 CBC's:  Recent Labs   Lab Test 07/24/19 08/10/18 08/08/18  07/27/18 08/02/17   WBC 7.1  --   --   --  5.4 4.9   HGB 10.9* 9.1* 8.8*   < > 6.9* 8.2*   .2*  --   --   --  83.9 93.4     --   --   --  258 226    < > = values in this interval not displayed.     Most Recent 3 BMP's:  Recent Labs   Lab Test 07/24/19 05/15/19 02/08/19  09/02/15  1933    144 145   < > 141   POTASSIUM 4.4 4.3 4.7   < > 4.6   CHLORIDE 110* 110* 109   < > 110*   CO2 25 24 26   < > 26   BUN 42* 37* 42   < > 29   CR 2.69* 2.53* 2.95*   < > 1.93*   ANIONGAP 9 10  --   --  5   RILEY 9.6 9.1 9.6   < > 9.6   * 122* 87   < > 112* "    < > = values in this interval not displayed.       ASSESSMENT/PLAN  Malaise  New complaint today. Reports urinary symptoms and recent fever. UA/UC, labs and f/u with results.     Gastroesophageal reflux disease without esophagitis  Chronic, well managed, no symptoms today. Monitor.     CKD (chronic kidney disease) stage 4, GFR 15-29 ml/min (H)  Chronic, stable last check. Avoid nephrotoxic meds. Monitor BMP every 6-12 months.     Deficiency anemia  Chronic, improved Hgb last check. Continue iron supplements and periodic Hgb checks. F/U with iron panel tomorrow.     Bipolar affective disorder, remission status unspecified (H)  Lewy body dementia without behavioral disturbance  Chronic, meds as above. Monitor for safety. Mood appears stable, no side effects.     Orders written by provider at facility  1. UA/UC diagnosis malaise  2. CBC, iron panel on 8/16 diagnosis anemia    Electronically signed by:  ASCENCION Monroy CNP

## 2019-08-15 NOTE — LETTER
8/15/2019        RE: Maribel Sevilla  [facility]  [address] [facility] [address]        [place] [facility]  Chief Complaint   Patient presents with     [facility] Regulatory     [place] Medical Record Number:  [id number]  Place of Service where encounter took place:  [facility] ([facility]) [[id number]]    HPI:    Maribel Sevilla  is 87 year old (12/20/1931), who is being seen today for a federally mandated E/M visit.  HPI information obtained from: facility chart records, facility staff, patient report and [facility] chart review.     Per recent progress notes:  87 year old female with hx of Lewy body dementia and bipolar disorder, sees psych and on multiple meds. Has CKD with baseline Cr 2.7 as well as iron def anemia. Hx of anxiety, insomnia, falls, constipation, HTN, GERD, arthritis.     Today's concerns are:  Malaise  Lewy body dementia without behavioral disturbance  Patient with dementia, forgetful. Today reports she is not feeling well. C/O body aches and note patient had a temp of 99.9 yesterday. She reports no dyspnea but has urinary frequency and urgency. Takes tylenol scheduled and PRN for aches and pains. Sats are 97% on room air.     Gastroesophageal reflux disease without esophagitis  Denies reflux. Continues scheduled Protonix and Zofran PRN.     CKD (chronic kidney disease) stage 4, GFR 15-29 ml/min (H)  Stable renal function last check. Recent SBP range 125-136  Lab Results   Component Value Date    CR 2.69 07/24/2019    CR 2.53 05/15/2019       Deficiency anemia  Patient on MVI, iron QOD.   Lab Results   Component Value Date    HGB 10.9 07/24/2019    HGB 9.1 08/10/2018       Bipolar affective disorder, remission status unspecified (H)  Mood managed with Aricept, Lamictal, Remeron, Zoloft and Seroquel. Sees psych per routine. No new complaints.       ALLERGIES:No known allergies  PAST MEDICAL HISTORY:   has a past medical history of Anemia, Anxiety,  Bipolar affective (H), Dementia, Depressive disorder, Gastro-oesophageal reflux disease, OA (osteoarthritis) of knee, and Renal insufficiencies, chronic.  PAST SURGICAL HISTORY:   has a past surgical history that includes Salpingo-oophorectomy bilateral; Hysterectomy; and Eye surgery.  FAMILY HISTORY: family history includes C.A.D. in her brother, father, mother, and sister; Psychotic Disorder in her son.  SOCIAL HISTORY:  reports that she has never smoked. She has never used smokeless tobacco. She reports that she does not drink alcohol or use drugs.    MEDICATIONS:  Current Outpatient Medications   Medication Sig Dispense Refill     acetaminophen (TYLENOL) 500 MG tablet Take 500 mg by mouth 2 times daily and 1 tab every 4 hours as needed       donepezil (ARICEPT) 5 MG tablet Take 5 mg by mouth 2 times daily  30 tablet 0     ferrous sulfate (IRON) 325 (65 Fe) MG tablet Take 325 mg by mouth daily every other day       lamoTRIgine (LAMICTAL) 150 MG tablet Take 200 mg by mouth At Bedtime        melatonin 5 MG tablet Take 5 mg by mouth At Bedtime       mineral oil OIL Place 2 drops into both ears daily       mirtazapine (REMERON) 15 MG tablet Take 30 mg by mouth At Bedtime        Multiple Vitamins-Minerals (PRESERVISION AREDS 2 PO) Take 1 tablet by mouth daily       ondansetron (ZOFRAN) 4 MG tablet Take 4 mg by mouth 4 times daily        pantoprazole (PROTONIX) 40 MG EC tablet Take 40 mg by mouth daily        QUEtiapine (SEROQUEL) 50 MG tablet Take 150 mg by mouth At Bedtime        senna-docusate (SENNA S) 8.6-50 MG per tablet Take 2 tablets by mouth At Bedtime       sertraline (ZOLOFT) 50 MG tablet Take 200 mg by mouth daily          Case Management:  I have reviewed the care plan and MDS and do agree with the plan. Patient's desire to return to the community is not present. Information reviewed:  Medications, vital signs, orders, and nursing notes.    ROS:  4 point ROS including Respiratory, CV, GI and , other than  "that noted in the HPI,  is negative    Vitals:  /72   Pulse 82   Temp 99.9  F (37.7  C)   Resp 18   Ht 1.575 m (5' 2\")   Wt 61 kg (134 lb 8 oz)   SpO2 97%   BMI 24.60 kg/m     Body mass index is 24.6 kg/m .  Exam:  GENERAL APPEARANCE:  Alert, in no distress, pleasant, cooperative, oriented x self, place  EYES:  EOM, lids, pupils and irises normal, sclera clear and conjunctiva normal, no discharge or mattering on lids or lashes noted  ENT:  Mouth normal, moist mucous membranes, nose normal without drainage or crusting, external ears without lesions, hearing acuity intact  RESP:  respiratory effort normal, no chest wall tenderness, no respiratory distress, Lung sounds clear, patient is on room air  CV:  Auscultation of heart done, rate and rhythm controlled and regular, no murmur, no rub or gallop. Edema none bilateral lower extremities.   ABDOMEN:  normal bowel sounds, soft, nontender, no palpable masses.  M/S:   Gait and station walks with assist , no tenderness or swelling of the joints; able to move all extremities  SKIN:  Inspection and palpation of skin and subcutaneous tissue: skin on extremities warm, dry and intact without rashes  NEURO: cranial nerves 2-12 grossly intact, no facial asymmetry, no speech deficits and able to follow directions, moves all extremities symmetrically  PSYCH:  insight and judgement and memory impaired, affect and mood flat    Lab/Diagnostic data:     Most Recent 3 CBC's:  Recent Labs   Lab Test 07/24/19 08/10/18 08/08/18  07/27/18 08/02/17   WBC 7.1  --   --   --  5.4 4.9   HGB 10.9* 9.1* 8.8*   < > 6.9* 8.2*   .2*  --   --   --  83.9 93.4     --   --   --  258 226    < > = values in this interval not displayed.     Most Recent 3 BMP's:  Recent Labs   Lab Test 07/24/19 05/15/19 02/08/19  09/02/15  1933    144 145   < > 141   POTASSIUM 4.4 4.3 4.7   < > 4.6   CHLORIDE 110* 110* 109   < > 110*   CO2 25 24 26   < > 26   BUN 42* 37* 42   < > 29   CR " 2.69* 2.53* 2.95*   < > 1.93*   ANIONGAP 9 10  --   --  5   RILEY 9.6 9.1 9.6   < > 9.6   * 122* 87   < > 112*    < > = values in this interval not displayed.       ASSESSMENT/PLAN  Malaise  New complaint today. Reports urinary symptoms and recent fever. UA/UC, labs and f/u with results.     Gastroesophageal reflux disease without esophagitis  Chronic, well managed, no symptoms today. Monitor.     CKD (chronic kidney disease) stage 4, GFR 15-29 ml/min (H)  Chronic, stable last check. Avoid nephrotoxic meds. Monitor BMP every 6-12 months.     Deficiency anemia  Chronic, improved Hgb last check. Continue iron supplements and periodic Hgb checks. F/U with iron panel tomorrow.     Bipolar affective disorder, remission status unspecified (H)  Lewy body dementia without behavioral disturbance  Chronic, meds as above. Monitor for safety. Mood appears stable, no side effects.     Orders written by provider at facility  1. UA/UC diagnosis malaise  2. CBC, iron panel on 8/16 diagnosis anemia    Electronically signed by:  ASCENCION Monroy CNP        Sincerely,        ASCENCION Monroy CNP

## 2019-08-16 ENCOUNTER — TRANSFERRED RECORDS (OUTPATIENT)
Dept: HEALTH INFORMATION MANAGEMENT | Facility: CLINIC | Age: 84
End: 2019-08-16

## 2019-08-16 LAB
DIFFERENTIAL: ABNORMAL
ERYTHROCYTE [DISTWIDTH] IN BLOOD BY AUTOMATED COUNT: 12.6 % (ref 11.9–15.5)
HCT VFR BLD AUTO: 37.6 % (ref 34.9–44.5)
HEMOGLOBIN: 11.1 G/DL (ref 12–15.5)
MCH RBC QN AUTO: 31.1 PG (ref 27.6–33.3)
MCHC RBC AUTO-ENTMCNC: 29.5 G/DL (ref 31.5–35.2)
MCV RBC AUTO: 105.3 FL (ref 80–100)
PLATELET # BLD AUTO: 234 X10(9)/L (ref 150–450)
RBC # BLD AUTO: 3.57 X10(12)/L (ref 3.9–5.03)
WBC # BLD AUTO: 6.1 X10(9)/L (ref 3.5–10.5)

## 2019-10-14 ENCOUNTER — NURSING HOME VISIT (OUTPATIENT)
Dept: GERIATRICS | Facility: CLINIC | Age: 84
End: 2019-10-14
Payer: COMMERCIAL

## 2019-10-14 VITALS
RESPIRATION RATE: 18 BRPM | DIASTOLIC BLOOD PRESSURE: 69 MMHG | BODY MASS INDEX: 24.77 KG/M2 | WEIGHT: 134.6 LBS | HEART RATE: 68 BPM | OXYGEN SATURATION: 97 % | HEIGHT: 62 IN | SYSTOLIC BLOOD PRESSURE: 131 MMHG | TEMPERATURE: 98.4 F

## 2019-10-14 DIAGNOSIS — F02.80 LEWY BODY DEMENTIA WITHOUT BEHAVIORAL DISTURBANCE (H): Primary | ICD-10-CM

## 2019-10-14 DIAGNOSIS — F31.9 BIPOLAR AFFECTIVE DISORDER, REMISSION STATUS UNSPECIFIED (H): ICD-10-CM

## 2019-10-14 DIAGNOSIS — G31.83 LEWY BODY DEMENTIA WITHOUT BEHAVIORAL DISTURBANCE (H): Primary | ICD-10-CM

## 2019-10-14 DIAGNOSIS — N18.4 CKD (CHRONIC KIDNEY DISEASE) STAGE 4, GFR 15-29 ML/MIN (H): ICD-10-CM

## 2019-10-14 PROCEDURE — 99309 SBSQ NF CARE MODERATE MDM 30: CPT | Performed by: INTERNAL MEDICINE

## 2019-10-14 ASSESSMENT — MIFFLIN-ST. JEOR: SCORE: 998.79

## 2019-10-14 NOTE — LETTER
10/14/2019        RE: Maribel Sevilla  [address] [address]        [place] [facility]  Nursing Home Regulatory Visit  October 14, 2019    Chief Complaint   Patient presents with     snf Regulatory       HPI:    Maribel Sevilla is a 87 year old  (12/20/1931), who is being seen today for a federally mandated E/M visit at [facility]. Today's concerns are:    1) Bipolar Disorder -- Follows with psychiatry. Ongoing cyclical pattern to her mood.  Managed with lamotrigine 200 mg qhs, mirtazapine 30 mg qhs, quetiapine 150 mg qhs and sertraline 200 mg daily  2) Lewy Body Dementia -- Managed with donepezil 5 mg BID. Requires 24/7 nursing and supportive cares  3) CKD, Stage IV   Baseline Cr 2.7-2.8 range Cr 2.69 on 7/24/19. Lytes [place]. Unclear etiology for renal failure. No peripheral edema.     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 Mother         ?     C.A.D. Father         ?     C.A.D. Sister         ?     C.A.D. Brother         ?     Psychotic Disorder Son         suicide       SOCIAL HISTORY:   Lives in a SNF    MEDICATIONS:  Current Outpatient Medications   Medication Sig Dispense Refill     acetaminophen (TYLENOL) 500 MG tablet Take 500 mg by mouth 2 times daily and 1 tab every 4 hours as needed       donepezil (ARICEPT) 5 MG tablet Take 5 mg by mouth 2 times daily  30 tablet 0     ferrous sulfate (IRON)  "325 (65 Fe) MG tablet Take 325 mg by mouth daily every other day       lamoTRIgine (LAMICTAL) 150 MG tablet Take 200 mg by mouth At Bedtime        mineral oil OIL Place 2 drops into both ears daily       mirtazapine (REMERON) 15 MG tablet Take 30 mg by mouth At Bedtime        Multiple Vitamins-Minerals (PRESERVISION AREDS 2 PO) Take 1 tablet by mouth daily       ondansetron (ZOFRAN) 4 MG tablet Take 4 mg by mouth 4 times daily        pantoprazole (PROTONIX) 40 MG EC tablet Take 40 mg by mouth daily        QUEtiapine (SEROQUEL) 50 MG tablet Take 150 mg by mouth At Bedtime        senna-docusate (SENNA S) 8.6-50 MG per tablet Take 2 tablets by mouth At Bedtime       sertraline (ZOLOFT) 50 MG tablet Take 200 mg by mouth daily          Medications reviewed:  Medications reconciled to facility chart and changes were made to reflect current medications as identified as above med list. Below are the changes that were made:   Medications stopped since last EPIC medication reconciliation:   Medications Discontinued During This Encounter   Medication Reason     melatonin 5 MG tablet      Medications started since last [facility] medication reconciliation:  No orders of the defined types were placed in this encounter.    Case Management:  I have reviewed the care plan and MDS and do agree with the plan.   Information reviewed:  Medications, vital signs, orders, and nursing notes.    ROS  Unable to be obtained due to cognitive impairment or aphasia.     PHYSICAL EXAM:  /69   Pulse 68   Temp 98.4  F (36.9  C)   Resp 18   Ht 1.575 m (5' 2\")   Wt 61.1 kg (134 lb 9.6 oz)   SpO2 97%   BMI 24.62 kg/m     Gen: laying in bed, alert, cooperative and in no acute distress  Resp: breathing non labored  GI: abdomen soft, not-tender  Ext: no LE edema  Neuro: CX II-XII grossly in tact; ROM in all four extremities grossly in tact  Psych: memory, judgement and insight impaired    Lab/Diagnostic data:  Reviewed as per " Epic    ASSESSMENT/PLAN    1) Bipolar Disorder  Follows with psychiatry. Ongoing cyclical pattern to her mood.    -- lamotrigine 200 mg qhs, mirtazapine 30 mg qhs, quetiapine 150 mg qhs and sertraline 200 mg daily  -- follow with in house [facility] psychiatry - appreciate their assistance   -- ongoing supportive cares    2) Lewy Body Dementia   Oriented to self. No recent cog scores that I could find in Matrix.   -- donepezil 5 mg BID   -- PharmD note reviewed - will talk with ACP  -- ongoing 24/7 nursing and supportive cares    3) CKD, Stage IV   Baseline Cr 2.7-2.8 range Cr 2.69 on 7/24/19. Lytes [place].   -- avoid nephrotoxic agents  -- BMP q6 mo      Electronically signed by:  Felecia Elizabeth MD        Sincerely,        Felecia Elizabeth MD

## 2019-10-15 NOTE — PROGRESS NOTES
[place] [facility]  Nursing Home Regulatory Visit  October 14, 2019    Chief Complaint   Patient presents with     [facility] Regulatory       HPI:    Maribel Sevilla is a 87 year old  (12/20/1931), who is being seen today for a federally mandated E/M visit at [facility]. Today's concerns are:    1) Bipolar Disorder -- Follows with psychiatry. Ongoing cyclical pattern to her mood.  Managed with lamotrigine 200 mg qhs, mirtazapine 30 mg qhs, quetiapine 150 mg qhs and sertraline 200 mg daily  2) Lewy Body Dementia -- Managed with donepezil 5 mg BID. Requires 24/7 nursing and supportive cares  3) CKD, Stage IV   Baseline Cr 2.7-2.8 range Cr 2.69 on 7/24/19. Lytes [place]. Unclear etiology for renal failure. No peripheral edema.     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 Mother         ?     C.A.D. Father         ?     C.A.D. Sister         ?     C.A.D. Brother         ?     Psychotic Disorder Son         suicide       SOCIAL HISTORY:   Lives in a SNF    MEDICATIONS:  Current Outpatient Medications   Medication Sig Dispense Refill     acetaminophen (TYLENOL) 500 MG tablet Take 500 mg by mouth 2 times daily and 1 tab every 4 hours as needed       donepezil (ARICEPT) 5 MG tablet Take 5 mg by mouth 2 times daily  30 tablet 0     ferrous sulfate (IRON) 325 (65 Fe) MG tablet Take 325 mg by mouth daily every other day       lamoTRIgine (LAMICTAL) 150 MG tablet  "Take 200 mg by mouth At Bedtime        mineral oil OIL Place 2 drops into both ears daily       mirtazapine (REMERON) 15 MG tablet Take 30 mg by mouth At Bedtime        Multiple Vitamins-Minerals (PRESERVISION AREDS 2 PO) Take 1 tablet by mouth daily       ondansetron (ZOFRAN) 4 MG tablet Take 4 mg by mouth 4 times daily        pantoprazole (PROTONIX) 40 MG EC tablet Take 40 mg by mouth daily        QUEtiapine (SEROQUEL) 50 MG tablet Take 150 mg by mouth At Bedtime        senna-docusate (SENNA S) 8.6-50 MG per tablet Take 2 tablets by mouth At Bedtime       sertraline (ZOLOFT) 50 MG tablet Take 200 mg by mouth daily          Medications reviewed:  Medications reconciled to facility chart and changes were made to reflect current medications as identified as above med list. Below are the changes that were made:   Medications stopped since last EPIC medication reconciliation:   Medications Discontinued During This Encounter   Medication Reason     melatonin 5 MG tablet      Medications started since last [facility] medication reconciliation:  No orders of the defined types were placed in this encounter.    Case Management:  I have reviewed the care plan and MDS and do agree with the plan.   Information reviewed:  Medications, vital signs, orders, and nursing notes.    ROS  Unable to be obtained due to cognitive impairment or aphasia.     PHYSICAL EXAM:  /69   Pulse 68   Temp 98.4  F (36.9  C)   Resp 18   Ht 1.575 m (5' 2\")   Wt 61.1 kg (134 lb 9.6 oz)   SpO2 97%   BMI 24.62 kg/m    Gen: laying in bed, alert, cooperative and in no acute distress  Resp: breathing non labored  GI: abdomen soft, not-tender  Ext: no LE edema  Neuro: CX II-XII grossly in tact; ROM in all four extremities grossly in tact  Psych: memory, judgement and insight impaired    Lab/Diagnostic data:  Reviewed as per Epic    ASSESSMENT/PLAN    1) Bipolar Disorder  Follows with psychiatry. Ongoing cyclical pattern to her mood.    -- lamotrigine " 200 mg qhs, mirtazapine 30 mg qhs, quetiapine 150 mg qhs and sertraline 200 mg daily  -- follow with in house [facility] psychiat[facility] - appreciate their assistance   -- ongoing supportive cares    2) Lewy Body Dementia   Oriented to self. No recent cog scores that I could find in Matrix.   -- donepezil 5 mg BID   -- PharmD note reviewed - will talk with ACP  -- ongoing 24/7 nursing and supportive cares    3) CKD, Stage IV   Baseline Cr 2.7-2.8 range Cr 2.69 on 7/24/19. Lytes [place].   -- avoid nephrotoxic agents  -- BMP q6 mo      Electronically signed by:  Felecia Elizabeth MD

## 2019-11-14 ENCOUNTER — NURSING HOME VISIT (OUTPATIENT)
Dept: GERIATRICS | Facility: CLINIC | Age: 84
End: 2019-11-14
Payer: COMMERCIAL

## 2019-11-14 VITALS
TEMPERATURE: 98.9 F | WEIGHT: 133.4 LBS | DIASTOLIC BLOOD PRESSURE: 80 MMHG | HEART RATE: 73 BPM | HEIGHT: 62 IN | RESPIRATION RATE: 18 BRPM | SYSTOLIC BLOOD PRESSURE: 132 MMHG | OXYGEN SATURATION: 97 % | BODY MASS INDEX: 24.55 KG/M2

## 2019-11-14 DIAGNOSIS — R26.9 ABNORMALITY OF GAIT: ICD-10-CM

## 2019-11-14 DIAGNOSIS — R11.15 NON-INTRACTABLE CYCLICAL VOMITING WITHOUT NAUSEA: ICD-10-CM

## 2019-11-14 DIAGNOSIS — G31.83 LEWY BODY DEMENTIA WITHOUT BEHAVIORAL DISTURBANCE (H): Primary | ICD-10-CM

## 2019-11-14 DIAGNOSIS — R53.1 WEAKNESS: ICD-10-CM

## 2019-11-14 DIAGNOSIS — F02.80 LEWY BODY DEMENTIA WITHOUT BEHAVIORAL DISTURBANCE (H): Primary | ICD-10-CM

## 2019-11-14 PROCEDURE — 99309 SBSQ NF CARE MODERATE MDM 30: CPT | Performed by: NURSE PRACTITIONER

## 2019-11-14 ASSESSMENT — MIFFLIN-ST. JEOR: SCORE: 993.35

## 2019-11-14 NOTE — PROGRESS NOTES
"[facility]  [place] Medical Record Number:  [id number]  Place of Service where encounter took place:  [facility] ([facility]) [[id number]]  Chief Complaint   Patient presents with     Nursing Home Acute       HPI:    Maribel Sevilla  is a 87 year old (12/20/1931), who is being seen today for an episodic care visit.  HPI information obtained from: facility chart records, facility staff and patient report. Today's concern is:     Lewy body dementia without behavioral disturbance (H)  Non-intractable cyclical vomiting without nausea  Abnormality of gait  Weakness     Resident seen for acute visit today in LTC facility per her request. She notes that she hasn't been eating much because the food doesn't \"taste right\" to her - she states, \"they give me some toast and think that's a good meal... It isn't\"; she would like to speak with dietary by this. She is also concerned about needing to have someone be with her at all times when she is walking - she does understand that this is directly correlated with the increase in falls that she has been having, moreso when out with family on [place] - she would like to try to work with therapies to see if she is able to regain some independence back. Denies CP, palpitations, fatigue, nausea, vomiting, increased SOB/DUENAS, fever, chills, and/or b/b concerns today.    Past Medical and Surgical History reviewed in Epic today.    MEDICATIONS:  Current Outpatient Medications   Medication Sig Dispense Refill     acetaminophen (TYLENOL) 500 MG tablet Take 500 mg by mouth 2 times daily and 1 tab every 4 hours as needed       donepezil (ARICEPT) 5 MG tablet Take 5 mg by mouth 2 times daily  30 tablet 0     ferrous sulfate (IRON) 325 (65 Fe) MG tablet Take 325 mg by mouth daily every other day       lamoTRIgine (LAMICTAL) 150 MG tablet Take 200 mg by mouth At Bedtime        mineral oil OIL Place 2 drops into both ears daily       mirtazapine (REMERON) 15 MG tablet Take 30 " "mg by mouth At Bedtime        Multiple Vitamins-Minerals (PRESERVISION AREDS 2 PO) Take 1 tablet by mouth daily       ondansetron (ZOFRAN) 4 MG tablet Take 4 mg by mouth 4 times daily        pantoprazole (PROTONIX) 40 MG EC tablet Take 40 mg by mouth daily        QUEtiapine (SEROQUEL) 50 MG tablet Take 150 mg by mouth At Bedtime        senna-docusate (SENNA S) 8.6-50 MG per tablet Take 2 tablets by mouth At Bedtime       sertraline (ZOLOFT) 50 MG tablet Take 200 mg by mouth daily        REVIEW OF SYSTEMS:  10 point ROS of systems including Constitutional, Eyes, Respiratory, Cardiovascular, Gastroenterology, Genitourinary, Integumentary, Musculoskeletal, Psychiatric were all negative except for pertinent positives noted in my HPI.    Objective:  /80   Pulse 73   Temp 98.9  F (37.2  C)   Resp 18   Ht 1.575 m (5' 2\")   Wt 60.5 kg (133 lb 6.4 oz)   SpO2 97%   BMI 24.40 kg/m    Exam:  GENERAL APPEARANCE:  Alert, in no distress, oriented, cooperative elderly woman upright in recliner in her room reading a book  ENT:  Mouth and posterior oropharynx normal, moist mucous membranes, [place]  EYES:  EOM, conjunctivae, lids, pupils and irises normal  NECK:  No adenopathy, masses or thyromegaly  RESP:  Respiratory effort and palpation of chest normal, lungs clear to auscultation, no respiratory distress  CV:  Palpation and auscultation of heart done, regular rate and rhythm, no murmur, rub, or gallop, no edema, +2 pedal pulses  ABDOMEN: Active bowel sounds x4, soft, nontender, no hepatosplenomegaly or other masses  M/S: Gait and station - ZEKE 2/2 patient being in chair today; Digits and nails abnormal - arthritic changes present  NEURO:  Cranial nerves 2-12 are normal tested and grossly at patient's baseline  PSYCH: Oriented X 3, normal insight, judgement and memory, affect and mood Pleasant, concerned, appropriate.    Labs:   Recent labs in [facility] reviewed by me today.     ASSESSMENT/PLAN:  (G31.83,  F02.80) Lewy body " "dementia without behavioral disturbance (H)  (primary encounter diagnosis)  Comment: Chronic; progressive; continues to require 24-hr supportive cares in LTC facility provided by staff - she does attend SOME activities, but tends to avoid them as she feels she \"isn't good at them\" - we discuss this and I encourage participation and involvement as no one is here to  your quality, it's to have fun and get out of her room.  Plan: Continue with LTC placement and encouragement to participate in facility activities as she wishes.     (R11.15) Non-intractable cyclical vomiting without nausea  Comment: Occasional - weight stable; Patient not happy with the food here - would like to discuss this with someone  Wt Readings from Last 4 Encounters:   11/14/19 60.5 kg (133 lb 6.4 oz)   10/14/19 61.1 kg (134 lb 9.6 oz)   08/15/19 61 kg (134 lb 8 oz)   07/23/19 60 kg (132 lb 3.2 oz)   Plan: Will have dietary stop in and meet with her    (R26.9) Abnormality of gait  (R53.1) Weakness  Comment: Patient with increasing falls and noting increased weakness  Plan: Will have PT eval/tx for strengthening and mobility.    Orders written by provider at facility    Dietary to meet with patient for menu specification    PT eval/tx for mobility and strengthening    Electronically signed by:  Dr. Bri Wiley, APRN, DNP  Phone: [phone]  Fax: [phone]  [address], [address]  [place] [address]      "

## 2019-12-16 ENCOUNTER — NURSING HOME VISIT (OUTPATIENT)
Dept: GERIATRICS | Facility: CLINIC | Age: 84
End: 2019-12-16
Payer: COMMERCIAL

## 2019-12-16 VITALS
BODY MASS INDEX: 24.27 KG/M2 | HEART RATE: 58 BPM | DIASTOLIC BLOOD PRESSURE: 64 MMHG | WEIGHT: 131.9 LBS | RESPIRATION RATE: 20 BRPM | SYSTOLIC BLOOD PRESSURE: 123 MMHG | TEMPERATURE: 98.2 F | HEIGHT: 62 IN | OXYGEN SATURATION: 97 %

## 2019-12-16 DIAGNOSIS — R29.6 RECURRENT FALLS: ICD-10-CM

## 2019-12-16 DIAGNOSIS — K21.9 GASTROESOPHAGEAL REFLUX DISEASE WITHOUT ESOPHAGITIS: Primary | ICD-10-CM

## 2019-12-16 DIAGNOSIS — M17.0 PRIMARY OSTEOARTHRITIS OF BOTH KNEES: ICD-10-CM

## 2019-12-16 PROCEDURE — 99309 SBSQ NF CARE MODERATE MDM 30: CPT | Performed by: NURSE PRACTITIONER

## 2019-12-16 ASSESSMENT — MIFFLIN-ST. JEOR: SCORE: 986.54

## 2019-12-16 NOTE — LETTER
12/16/2019        RE: Maribel Sevilla  [facility]  [address] [facility] [address]        [facility]  Chief Complaint   Patient presents with     shelter Regulatory     [place] Medical Record Number:  [id number]  Place of Service where encounter took place:  [facility] ([facility]) [[id number]]    HPI:    Maribel Sevilla  is 87 year old (12/20/1931), who is being seen today for a federally mandated E/M visit.  HPI information obtained from: facility chart records, facility staff and patient report. Today's concerns are:     Gastroesophageal reflux disease without esophagitis  Primary osteoarthritis of both knees  Recurrent falls     Resident is seen today for Regulatory visit in LTC facility. She is seen in her new room after being moved for remodeling. She notes that things are going well since the move, she feels well and has been participating in activities. She has been working with therapies on walking as well and is walking with staff assist and her walker. Her appetite remains variable based upon her mental health. She is sleeping good at night. Denies CP, palpitations, fatigue, nausea, vomiting, increased SOB/DUENAS, fever, chills, and/or b/b concerns today.    ALLERGIES:No known allergies  PAST MEDICAL HISTORY:   has a past medical history of Anemia, Anxiety, Bipolar affective (H), Dementia, Depressive disorder, Gastro-oesophageal reflux disease, OA (osteoarthritis) of knee, and Renal insufficiencies, chronic.  PAST SURGICAL HISTORY:   has a past surgical history that includes Salpingo-oophorectomy bilateral; Hysterectomy; and Eye surgery.  FAMILY HISTORY: family history includes C.A.D. in her brother, father, mother, and sister; Psychotic Disorder in her son.  SOCIAL HISTORY:  reports that she has never smoked. She has never used smokeless tobacco. She reports that she does not drink alcohol or use drugs.    MEDICATIONS:  Current Outpatient Medications  "  Medication Sig Dispense Refill     acetaminophen (TYLENOL) 500 MG tablet Take 500 mg by mouth 2 times daily and 1 tab every 4 hours as needed       donepezil (ARICEPT) 5 MG tablet Take 5 mg by mouth 2 times daily  30 tablet 0     ferrous sulfate (IRON) 325 (65 Fe) MG tablet Take 325 mg by mouth daily every other day       lamoTRIgine (LAMICTAL) 150 MG tablet Take 200 mg by mouth At Bedtime        mineral oil OIL Place 2 drops into both ears daily       mirtazapine (REMERON) 15 MG tablet Take 30 mg by mouth At Bedtime        Multiple Vitamins-Minerals (PRESERVISION AREDS 2 PO) Take 1 tablet by mouth daily       ondansetron (ZOFRAN) 4 MG tablet Take 4 mg by mouth 4 times daily        pantoprazole (PROTONIX) 40 MG EC tablet Take 40 mg by mouth daily        QUEtiapine (SEROQUEL) 50 MG tablet Take 150 mg by mouth At Bedtime        senna-docusate (SENNA S) 8.6-50 MG per tablet Take 2 tablets by mouth At Bedtime       sertraline (ZOLOFT) 50 MG tablet Take 200 mg by mouth daily        Case Management:  I have reviewed the care plan and MDS and do agree with the plan. Patient's desire to return to the community is present, but is not able due to care needs . Information reviewed:  Medications, vital signs, orders, and nursing notes.    ROS:  10 point ROS of systems including Constitutional, Eyes, Respiratory, Cardiovascular, Gastroenterology, Genitourinary, Integumentary, Musculoskeletal, Psychiatric were all negative except for pertinent positives noted in my HPI.    Vitals:  /64   Pulse 58   Temp 98.2  F (36.8  C)   Resp 20   Ht 1.575 m (5' 2\")   Wt 59.8 kg (131 lb 14.4 oz)   SpO2 97%   BMI 24.12 kg/m     Body mass index is 24.12 kg/m .  Exam:  GENERAL APPEARANCE:  Alert, in no distress, oriented, cooperative elderly woman upright in chair  ENT:  Mouth and posterior oropharynx normal, moist mucous membranes, [place]  EYES:  EOM, conjunctivae, lids, pupils and irises normal  NECK:  No adenopathy, masses or " thyromegaly  RESP:  Respiratory effort and palpation of chest normal, lungs clear to auscultation, no respiratory distress  CV:  Palpation and auscultation of heart done, regular rate and rhythm, no murmur, rub, or gallop, no edema, +2 pedal pulses  ABDOMEN: Active bowel sounds x4, soft, nontender, no hepatosplenomegaly or other masses  M/S:   Gait and station slow and deliberate with SBA and RW; Digits and nails abnormal - arthritic changes present  NEURO:   Cranial nerves 2-12 are normal tested and grossly at patient's baseline  PSYCH:  oriented X 3, normal insight, judgement and memory, affect and mood Pleasant, happy    Lab/Diagnostic data:   Recent labs in [facility] reviewed by me today.     ASSESSMENT/PLAN  (K21.9) Gastroesophageal reflux disease without esophagitis  (primary encounter diagnosis)  Comment: Chronic - denies GI upset today. Managed on regimen of Protonix.  Plan: Stable on current regimen; continue medications as currently ordered.    (M17.0) Primary osteoarthritis of both knees  Comment: Chronic - denies c/o pain today. Managed on regimen of Tylenol.  Plan: Stable on current regimen; continue medications as currently ordered.    (R29.6) Recurrent falls  Comment: Ongoing - therapies re-started following recurrent falls. She continues to work with them and she notes this has been going well.  Plan: PT following - adv per their recommendations; continue high fall risk precautions.    Electronically signed by:  ASCENCION Bhatti, DNP, A/GNP-[facility]  [address] [place] [address] [address]  [place] [address]     Cell: [phone]  Fax: [phone]  Email: [email or web address]                 Sincerely,        ASCENCION Rm CNP

## 2019-12-16 NOTE — PROGRESS NOTES
[facility]  Chief Complaint   Patient presents with     [facility] Regulatory     [place] Medical Record Number:  [id number]  Place of Service where encounter took place:  [facility] ([facility]) [[id number]]    HPI:    Maribel Sevilla  is 87 year old (12/20/1931), who is being seen today for a federally mandated E/M visit.  HPI information obtained from: facility chart records, facility staff and patient report. Today's concerns are:     Gastroesophageal reflux disease without esophagitis  Primary osteoarthritis of both knees  Recurrent falls     Resident is seen today for Regulatory visit in LTC facility. She is seen in her new room after being moved for remodeling. She notes that things are going well since the move, she feels well and has been participating in activities. She has been working with therapies on walking as well and is walking with staff assist and her walker. Her appetite remains variable based upon her mental health. She is sleeping good at night. Denies CP, palpitations, fatigue, nausea, vomiting, increased SOB/DUENAS, fever, chills, and/or b/b concerns today.    ALLERGIES:No known allergies  PAST MEDICAL HISTORY:   has a past medical history of Anemia, Anxiety, Bipolar affective (H), Dementia, Depressive disorder, Gastro-oesophageal reflux disease, OA (osteoarthritis) of knee, and Renal insufficiencies, chronic.  PAST SURGICAL HISTORY:   has a past surgical history that includes Salpingo-oophorectomy bilateral; Hysterectomy; and Eye surgery.  FAMILY HISTORY: family history includes C.A.D. in her brother, father, mother, and sister; Psychotic Disorder in her son.  SOCIAL HISTORY:  reports that she has never smoked. She has never used smokeless tobacco. She reports that she does not drink alcohol or use drugs.    MEDICATIONS:  Current Outpatient Medications   Medication Sig Dispense Refill     acetaminophen (TYLENOL) 500 MG tablet Take 500 mg by mouth 2 times daily and 1  "tab every 4 hours as needed       donepezil (ARICEPT) 5 MG tablet Take 5 mg by mouth 2 times daily  30 tablet 0     ferrous sulfate (IRON) 325 (65 Fe) MG tablet Take 325 mg by mouth daily every other day       lamoTRIgine (LAMICTAL) 150 MG tablet Take 200 mg by mouth At Bedtime        mineral oil OIL Place 2 drops into both ears daily       mirtazapine (REMERON) 15 MG tablet Take 30 mg by mouth At Bedtime        Multiple Vitamins-Minerals (PRESERVISION AREDS 2 PO) Take 1 tablet by mouth daily       ondansetron (ZOFRAN) 4 MG tablet Take 4 mg by mouth 4 times daily        pantoprazole (PROTONIX) 40 MG EC tablet Take 40 mg by mouth daily        QUEtiapine (SEROQUEL) 50 MG tablet Take 150 mg by mouth At Bedtime        senna-docusate (SENNA S) 8.6-50 MG per tablet Take 2 tablets by mouth At Bedtime       sertraline (ZOLOFT) 50 MG tablet Take 200 mg by mouth daily        Case Management:  I have reviewed the care plan and MDS and do agree with the plan. Patient's desire to return to the community is present, but is not able due to care needs . Information reviewed:  Medications, vital signs, orders, and nursing notes.    ROS:  10 point ROS of systems including Constitutional, Eyes, Respiratory, Cardiovascular, Gastroenterology, Genitourinary, Integumentary, Musculoskeletal, Psychiatric were all negative except for pertinent positives noted in my HPI.    Vitals:  /64   Pulse 58   Temp 98.2  F (36.8  C)   Resp 20   Ht 1.575 m (5' 2\")   Wt 59.8 kg (131 lb 14.4 oz)   SpO2 97%   BMI 24.12 kg/m    Body mass index is 24.12 kg/m .  Exam:  GENERAL APPEARANCE:  Alert, in no distress, oriented, cooperative elderly woman upright in chair  ENT:  Mouth and posterior oropharynx normal, moist mucous membranes, [place]  EYES:  EOM, conjunctivae, lids, pupils and irises normal  NECK:  No adenopathy, masses or thyromegaly  RESP:  Respiratory effort and palpation of chest normal, lungs clear to auscultation, no respiratory " distress  CV:  Palpation and auscultation of heart done, regular rate and rhythm, no murmur, rub, or gallop, no edema, +2 pedal pulses  ABDOMEN: Active bowel sounds x4, soft, nontender, no hepatosplenomegaly or other masses  M/S:   Gait and station slow and deliberate with SBA and RW; Digits and nails abnormal - arthritic changes present  NEURO:   Cranial nerves 2-12 are normal tested and grossly at patient's baseline  PSYCH:  oriented X 3, normal insight, judgement and memory, affect and mood Pleasant, happy    Lab/Diagnostic data:   Recent labs in [facility] reviewed by me today.     ASSESSMENT/PLAN  (K21.9) Gastroesophageal reflux disease without esophagitis  (primary encounter diagnosis)  Comment: Chronic - denies GI upset today. Managed on regimen of Protonix.  Plan: Stable on current regimen; continue medications as currently ordered.    (M17.0) Primary osteoarthritis of both knees  Comment: Chronic - denies c/o pain today. Managed on regimen of Tylenol.  Plan: Stable on current regimen; continue medications as currently ordered.    (R29.6) Recurrent falls  Comment: Ongoing - therapies re-started following recurrent falls. She continues to work with them and she notes this has been going well.  Plan: PT following - adv per their recommendations; continue high fall risk precautions.    Electronically signed by:  ASCENCION Bhatti, DNP, A/GNP-[facility]  [address] [place] [address] [address]  [place] [address]     Cell: [phone]  Fax: [phone]  Email: [email or web address]

## 2020-02-03 ENCOUNTER — DOCUMENTATION ONLY (OUTPATIENT)
Dept: OTHER | Facility: CLINIC | Age: 85
End: 2020-02-03

## 2020-02-11 ENCOUNTER — NURSING HOME VISIT (OUTPATIENT)
Dept: GERIATRICS | Facility: CLINIC | Age: 85
End: 2020-02-11
Payer: MEDICARE

## 2020-02-11 VITALS
SYSTOLIC BLOOD PRESSURE: 98 MMHG | DIASTOLIC BLOOD PRESSURE: 58 MMHG | RESPIRATION RATE: 16 BRPM | OXYGEN SATURATION: 92 % | WEIGHT: 137.8 LBS | HEART RATE: 82 BPM | HEIGHT: 62 IN | BODY MASS INDEX: 25.36 KG/M2 | TEMPERATURE: 98.6 F

## 2020-02-11 DIAGNOSIS — F31.9 BIPOLAR AFFECTIVE DISORDER, REMISSION STATUS UNSPECIFIED (H): ICD-10-CM

## 2020-02-11 DIAGNOSIS — F02.80 LEWY BODY DEMENTIA WITHOUT BEHAVIORAL DISTURBANCE (H): Primary | ICD-10-CM

## 2020-02-11 DIAGNOSIS — G31.83 LEWY BODY DEMENTIA WITHOUT BEHAVIORAL DISTURBANCE (H): Primary | ICD-10-CM

## 2020-02-11 PROCEDURE — 99308 SBSQ NF CARE LOW MDM 20: CPT | Performed by: NURSE PRACTITIONER

## 2020-02-11 ASSESSMENT — MIFFLIN-ST. JEOR: SCORE: 1008.31

## 2020-02-11 NOTE — PROGRESS NOTES
"[facility]  [place] Medical Record Number:  [id number]  Place of Service where encounter took place:  [facility] ([facility]) [[id number]]  Chief Complaint   Patient presents with     RECHECK       HPI:    Maribel Sevilla  is a 88 year old (12/20/1931), who is being seen today for an episodic care visit.  HPI information obtained from: facility chart records and facility staff.       Today's concern is:  Pharmacy recommendation received that notes patient is having disturbing dreams and there may be interaction between lamictal and mirtazapine. Pharmacy recommends either decreasing lamictal or changing to morning administration. Staff have no acute concerns today. Patient is followed by psychiatry in house now    Past Medical and Surgical History reviewed in Epic today.      REVIEW OF SYSTEMS:  4 point ROS including Respiratory, CV, GI and , other than that noted in the HPI,  is negative    Objective:  BP 98/58   Pulse 82   Temp 98.6  F (37  C)   Resp 16   Ht 1.575 m (5' 2\")   Wt 62.5 kg (137 lb 12.8 oz)   SpO2 92%   BMI 25.20 kg/m    Exam:  GENERAL APPEARANCE:  Alert, in no distress    Labs:   Recent labs in EPIC reviewed by me today.     ASSESSMENT/PLAN:  (G31.83,  F02.80) Lewy body dementia without behavioral disturbance (H)  (primary encounter diagnosis)  (F31.9) Bipolar affective disorder, remission status unspecified (H)  Comment: Behavioral/emotional issues appear well controlled. At this point, the safest decision would be to just change the timing of the lamictal to morning. Psychiatry can explore whether they feel a decrease would be beneficial      Electronically signed by:  ASCENCION Pope CNP   [facility]  Phone: [phone]            "

## 2020-02-11 NOTE — LETTER
"    2/11/2020        RE: Maribel Sevilla  [facility]  [address] [facility] [address]        [place] [facility]  [place] [facility] Record Number:  [id number]  Place of Service where encounter took place:  [facility] ([facility] [[id number]]  Chief Complaint   Patient presents with     RECHECK       HPI:    Maribel Sevilla  is a 88 year old (12/20/1931), who is being seen today for an episodic care visit.  HPI information obtained from: facility chart records and facility staff.       Today's concern is:  Pharmacy recommendation received that notes patient is having disturbing dreams and there may be interaction between lamictal and mirtazapine. Pharmacy recommends either decreasing lamictal or changing to morning administration. Staff have no acute concerns today. Patient is followed by psychiatry in house now    Past Medical and Surgical History reviewed in Epic today.      REVIEW OF SYSTEMS:  4 point ROS including Respiratory, CV, GI and , other than that noted in the HPI,  is negative    Objective:  BP 98/58   Pulse 82   Temp 98.6  F (37  C)   Resp 16   Ht 1.575 m (5' 2\")   Wt 62.5 kg (137 lb 12.8 oz)   SpO2 92%   BMI 25.20 kg/m     Exam:  GENERAL APPEARANCE:  Alert, in no distress    Labs:   Recent labs in EPIC reviewed by me today.     ASSESSMENT/PLAN:  (G31.83,  F02.80) Lewy body dementia without behavioral disturbance (H)  (primary encounter diagnosis)  (F31.9) Bipolar affective disorder, remission status unspecified (H)  Comment: Behavioral/emotional issues appear well controlled. At this point, the safest decision would be to just change the timing of the lamictal to morning. Psychiatry can explore whether they feel a decrease would be beneficial      Electronically signed by:  ASCENCION Pope CNP   [facility]  Phone: [phone]                    "

## 2020-02-12 RX ORDER — LAMOTRIGINE 200 MG/1
200 TABLET ORAL EVERY MORNING
Start: 2020-02-12

## 2020-02-18 ENCOUNTER — NURSING HOME VISIT (OUTPATIENT)
Dept: GERIATRICS | Facility: CLINIC | Age: 85
End: 2020-02-18
Payer: MEDICARE

## 2020-02-18 DIAGNOSIS — F02.80 LEWY BODY DEMENTIA WITHOUT BEHAVIORAL DISTURBANCE (H): Primary | ICD-10-CM

## 2020-02-18 DIAGNOSIS — G31.83 LEWY BODY DEMENTIA WITHOUT BEHAVIORAL DISTURBANCE (H): Primary | ICD-10-CM

## 2020-02-18 DIAGNOSIS — R29.6 RECURRENT FALLS: ICD-10-CM

## 2020-02-18 DIAGNOSIS — K21.9 GASTROESOPHAGEAL REFLUX DISEASE WITHOUT ESOPHAGITIS: ICD-10-CM

## 2020-02-19 NOTE — PROGRESS NOTES
Patient was seen for regulatory long-term-care visit.  This is an initial visit for Dr. Arreguin    Course reviewed in epic, discussed with nurse practitioner.    Past medical history remarkable for Lewy body dementia, bipolar disorder, chronic kidney disease stage IV (unclear etiology), GERD, osteoarthritis of both knees.    Patient reports feeling well overall.  She did suffer a laceration to her left forearm following a fall recently.  Staff notes the area is healing well.    Patient states her spirits are good, she denies headache, nausea vomiting, chest pain, abdominal pain    Current medications reviewed in [facility].    Exam  Pleasant female sitting at a table in the common area, playing bingo.  She is in good spirits.  Is oriented to person and general circumstances.  HEENT no oral lesions  CV regular rhythm   Lungs clear  Abdomen soft, nontender  Extremities no edema.  Left forearm is wrapped.  No tremor or rigidity.  Gait was not assessed.      Assessment    Lewy body dementia, stable    Bipolar affect disorder, stable on current therapy with Lamictal, mirtazapine, sertraline, Seroquel    Chronic kidney disease stage IV with baseline creatinine 2.7    History of frequent falls    GERD on Protonix.    Plan  Continue current medications  Routine lab monitoring  Psychiatry follow-up.

## 2020-03-16 ENCOUNTER — TELEPHONE (OUTPATIENT)
Dept: GERIATRICS | Facility: CLINIC | Age: 85
End: 2020-03-16

## 2020-03-16 NOTE — TELEPHONE ENCOUNTER
Prior Authorization Retail Medication Request    Medication/Dose: Seroquel 100mg po tablet ; 150mg po at bedtime   ICD code (if different than what is on RX):  G31.83 , F31.4   Previously Tried and Failed:  Unknown   Rationale:  Continuation of therapy     Insurance Name:  are Plus   Insurance ID:  [id number]      Pharmacy Information (if different than what is on RX)  Name:  [facility]   Phone:  [phone]

## 2020-03-17 NOTE — TELEPHONE ENCOUNTER
Central Prior Authorization Team   Phone: [phone]    PA Initiation    Medication:   Insurance Company: [employer] - Phone [phone] Fax [phone]  Pharmacy Filling the Rx: [facility] - [place], [place] - [address]  Filling Pharmacy Phone: [phone]  Filling Pharmacy Fax:    Start Date: 3/17/2020

## 2020-03-18 NOTE — TELEPHONE ENCOUNTER
The insurance listed is not valid until 5/1/2020.  Unable to submit PA at this time. Since the insurance is not active yet insurance rep wasn't able to let me know if a PA will be needed in May.  Message left at pharmacy to find out if they have any current insurance listed for this patient.

## 2020-03-18 NOTE — TELEPHONE ENCOUNTER
Central Prior Authorization Team   Phone: [phone]    PA Initiation    Medication: QUEtiapine (SEROQUEL)  Insurance Company: [employer] - Phone [phone] Fax [phone]  Pharmacy Filling the Rx: [facility] - [place], [place] - [address]  Filling Pharmacy Phone: [phone]  Filling Pharmacy Fax:    Start Date: 3/17/2020

## 2020-03-19 NOTE — TELEPHONE ENCOUNTER
Prior Authorization Approval    Authorization Effective Date: 3/18/2020  Authorization Expiration Date: 12/31/2020  Medication: QUEtiapine (SEROQUEL)-APPROVED  Approved Dose/Quantity:    Reference #:     Insurance Company: [employer] - Phone [phone] Fax [phone]  Expected CoPay:       CoPay Card Available:      Foundation Assistance Needed:    Which Pharmacy is filling the prescription (Not needed for infusion/clinic administered): [facility] - [place], [place] - [address]  Pharmacy Notified: Yes  Patient Notified: Yes  **Instructed pharmacy to notify patient when script is ready to /ship.**

## 2020-04-16 ENCOUNTER — VIRTUAL VISIT (OUTPATIENT)
Dept: GERIATRICS | Facility: CLINIC | Age: 85
End: 2020-04-16
Payer: MEDICARE

## 2020-04-16 VITALS
DIASTOLIC BLOOD PRESSURE: 74 MMHG | OXYGEN SATURATION: 93 % | BODY MASS INDEX: 25.4 KG/M2 | HEART RATE: 74 BPM | HEIGHT: 62 IN | TEMPERATURE: 97.2 F | WEIGHT: 138 LBS | SYSTOLIC BLOOD PRESSURE: 125 MMHG | RESPIRATION RATE: 18 BRPM

## 2020-04-16 DIAGNOSIS — F02.80 LEWY BODY DEMENTIA WITHOUT BEHAVIORAL DISTURBANCE (H): ICD-10-CM

## 2020-04-16 DIAGNOSIS — R53.83 FATIGUE, UNSPECIFIED TYPE: Primary | ICD-10-CM

## 2020-04-16 DIAGNOSIS — K21.9 GASTROESOPHAGEAL REFLUX DISEASE WITHOUT ESOPHAGITIS: ICD-10-CM

## 2020-04-16 DIAGNOSIS — G31.83 LEWY BODY DEMENTIA WITHOUT BEHAVIORAL DISTURBANCE (H): ICD-10-CM

## 2020-04-16 DIAGNOSIS — F31.9 BIPOLAR AFFECTIVE DISORDER, REMISSION STATUS UNSPECIFIED (H): ICD-10-CM

## 2020-04-16 PROCEDURE — 99309 SBSQ NF CARE MODERATE MDM 30: CPT | Mod: 95 | Performed by: NURSE PRACTITIONER

## 2020-04-16 ASSESSMENT — MIFFLIN-ST. JEOR: SCORE: 1009.21

## 2020-04-16 NOTE — LETTER
"    4/16/2020        RE: Maribel Sevilla  [facility]  [address] [place] [id number]        [facility]   Maribel Sevilla is being evaluated via a billable video visit due to the restrictions of the Covid-19 pandemic.   The patient has been notified of following:  \"This video visit will be conducted via a call between you and your provider. We have found that certain health care needs can be provided without the need for an in-person physical exam.  This service lets us provide the care you need with a video conversation. If during the course of the call the provider feels a video visit is not appropriate, you will not be charged for this service.\"   The provider has received verbal consent for a Video Visit from the patient or first contact? Yes  Patient or facility staff would like the video invitation sent by: N/A   Video Start Time: 2:34pm    [facility] Medical Record Number:  [id number]  Place of Location at the time of visit: [facility]       Chief Complaint   Patient presents with     [facility]     HPI:  Maribel Sevilla  is a 88 year old (12/20/1931), who is being seen today for an E-REG visit.  HPI information obtained from: facility chart records, facility staff and patient report.     Today's concern is:  Patient has been spending all day in bed for a couple of days. She says her back hurts. She has no other specific physical complaints. The nurse manager reports that she does this intermittently. She will lay in bed for a few days and then will get back to her usual activity.    Case Management and Team Discussion:  I called and spoke with Nursing staff at the facility regarding the current Plan of Care. I have reviewed the facility/SNF care plan/MDS, including the falls risk, nutrition and pain screening. I also reviewed the current immunizations, and preventive care.Patient's desire to return to the community is not present. Current Level of " "Care is appropriate at this time. The Plan of Care is appropriate at this time.     Advance Directive Discussion:    I reviewed the current advanced directives as reflected in EPIC, the POLST and the facility chart, and verified the congruency of orders. I contacted the first party  and left a message regarding the plan of Care.  I did not due to cognitive impairment review the advance directives with the resident.       Past Medical and Surgical History reviewed in Epic today.  MEDICATIONS:    Current Outpatient Medications   Medication Sig Dispense Refill     acetaminophen (TYLENOL) 500 MG tablet Take 500 mg by mouth 2 times daily and 1 tab every 4 hours as needed       donepezil (ARICEPT) 5 MG tablet Take 5 mg by mouth 2 times daily  30 tablet 0     ferrous sulfate (IRON) 325 (65 Fe) MG tablet Take 325 mg by mouth daily every other day       lamoTRIgine (LAMICTAL) 200 MG tablet Take 1 tablet (200 mg) by mouth every morning       mineral oil OIL Place 2 drops into both ears daily       mirtazapine (REMERON) 15 MG tablet Take 30 mg by mouth At Bedtime        Multiple Vitamins-Minerals (PRESERVISION AREDS 2 PO) Take 1 tablet by mouth daily       ondansetron (ZOFRAN) 4 MG tablet Take 4 mg by mouth 4 times daily        pantoprazole (PROTONIX) 40 MG EC tablet Take 40 mg by mouth daily        QUEtiapine (SEROQUEL) 50 MG tablet Take 150 mg by mouth At Bedtime        senna-docusate (SENNA S) 8.6-50 MG per tablet Take 2 tablets by mouth At Bedtime       sertraline (ZOLOFT) 50 MG tablet Take 200 mg by mouth daily        REVIEW OF SYSTEMS: Reliability questionable secondary to cognitive impairment. Denies chest pain, shortness of breath, fevers, chills, headache, nausea, vomiting, dysuria or bowel abnormalities.      Objective: /74   Pulse 74   Temp 97.2  F (36.2  C)   Resp 18   Ht 1.575 m (5' 2\")   Wt 62.6 kg (138 lb)   SpO2 93%   BMI 25.24 kg/m    Limited visit exam done given COVID-19 precautions.   GENERAL " APPEARANCE:  Alert, in no distress  ENT:  Mouth and posterior oropharynx normal, moist mucous membranes, normal hearing acuity  EYES:  EOM normal, conjunctiva and lids normal  RESP:  no respiratory distress  PSYCH:  insight and judgement impaired, memory impaired , affect abnormal flat     Labs:   Recent labs in [facility] reviewed by me today.   Due to COVID and trying to restrict patient interactions, such as with phlebotomists coming from outside, will check labs only if needed.      ASSESSMENT/PLAN:  (R53.83) Fatigue, unspecified type  (primary encounter diagnosis)  Comment: There is no evidence of any acute illness. This is likely cyclical behavior related to her mental illness  Plan: Monitor for any acute changes, s/sx illness    (G31.83,  F02.80) Lewy body dementia without behavioral disturbance (H)  Comment: Chronic, progressive. Needs 24 hour skilled nursing care. HPI/ROS difficult to obtain with cognitive impairment. Expect further functional and cognitive decline. Expect weight loss.  Plan: Continue 24 hour care. Monitor weight. Monitor functional status. Monitor for behavioral disturbances.    (F31.9) Bipolar affective disorder, remission status unspecified (H)  Comment: Chronic condition being managed with medications and continues to have expected and unavoidable exacerbations with current therapy.  Plan: Continue current POC with no changes at this time and adjustments as needed.    (K21.9) Gastroesophageal reflux disease without esophagitis  Comment: Chronic condition being managed with medications and is currently asymptomatic.  Plan: Continue current POC with no changes at this time and adjustments as needed.      Electronically signed by:  ASCENCION Pope CNP   [facility]  Phone: [phone]      Video-Visit Details  Type of service:  Video Visit  Video End Time (time video stopped): 2:37pm  Distant Location (provider location):  [facility]

## 2020-04-16 NOTE — PROGRESS NOTES
"[facility]   Maribel Sevilla is being evaluated via a billable video visit due to the restrictions of the Covid-19 pandemic.   The patient has been notified of following:  \"This video visit will be conducted via a call between you and your provider. We have found that certain health care needs can be provided without the need for an in-person physical exam.  This service lets us provide the care you need with a video conversation. If during the course of the call the provider feels a video visit is not appropriate, you will not be charged for this service.\"   The provider has received verbal consent for a Video Visit from the patient or first contact? Yes  Patient or facility staff would like the video invitation sent by: N/A   Video Start Time: 2:34pm    [facility] Medical Record Number:  [id number]  Place of Location at the time of visit: [facility]       Chief Complaint   Patient presents with     halfway Regulatory     HPI:  Maribel Sevilla  is a 88 year old (12/20/1931), who is being seen today for an E-REG visit.  HPI information obtained from: facility chart records, facility staff and patient report.     Today's concern is:  Patient has been spending all day in bed for a couple of days. She says her back hurts. She has no other specific physical complaints. The nurse manager reports that she does this intermittently. She will lay in bed for a few days and then will get back to her usual activity.    Case Management and Team Discussion:  I called and spoke with Nursing staff at the facility regarding the current Plan of Care. I have reviewed the facility/SNF care plan/MDS, including the falls risk, nutrition and pain screening. I also reviewed the current immunizations, and preventive care.Patient's desire to return to the community is not present. Current Level of Care is appropriate at this time. The Plan of Care is appropriate at this time.     Advance Directive " "Discussion:    I reviewed the current advanced directives as reflected in EPIC, the POLST and the facility chart, and verified the congruency of orders. I contacted the first party  and left a message regarding the plan of Care.  I did not due to cognitive impairment review the advance directives with the resident.       Past Medical and Surgical History reviewed in Epic today.  MEDICATIONS:    Current Outpatient Medications   Medication Sig Dispense Refill     acetaminophen (TYLENOL) 500 MG tablet Take 500 mg by mouth 2 times daily and 1 tab every 4 hours as needed       donepezil (ARICEPT) 5 MG tablet Take 5 mg by mouth 2 times daily  30 tablet 0     ferrous sulfate (IRON) 325 (65 Fe) MG tablet Take 325 mg by mouth daily every other day       lamoTRIgine (LAMICTAL) 200 MG tablet Take 1 tablet (200 mg) by mouth every morning       mineral oil OIL Place 2 drops into both ears daily       mirtazapine (REMERON) 15 MG tablet Take 30 mg by mouth At Bedtime        Multiple Vitamins-Minerals (PRESERVISION AREDS 2 PO) Take 1 tablet by mouth daily       ondansetron (ZOFRAN) 4 MG tablet Take 4 mg by mouth 4 times daily        pantoprazole (PROTONIX) 40 MG EC tablet Take 40 mg by mouth daily        QUEtiapine (SEROQUEL) 50 MG tablet Take 150 mg by mouth At Bedtime        senna-docusate (SENNA S) 8.6-50 MG per tablet Take 2 tablets by mouth At Bedtime       sertraline (ZOLOFT) 50 MG tablet Take 200 mg by mouth daily        REVIEW OF SYSTEMS: Reliability questionable secondary to cognitive impairment. Denies chest pain, shortness of breath, fevers, chills, headache, nausea, vomiting, dysuria or bowel abnormalities.      Objective: /74   Pulse 74   Temp 97.2  F (36.2  C)   Resp 18   Ht 1.575 m (5' 2\")   Wt 62.6 kg (138 lb)   SpO2 93%   BMI 25.24 kg/m    Limited visit exam done given COVID-19 precautions.   GENERAL APPEARANCE:  Alert, in no distress  ENT:  Mouth and posterior oropharynx normal, moist mucous " membranes, normal hearing acuity  EYES:  EOM normal, conjunctiva and lids normal  RESP:  no respiratory distress  PSYCH:  insight and judgement impaired, memory impaired , affect abnormal flat     Labs:   Recent labs in [facility] reviewed by me today.   Due to COVID and trying to restrict patient interactions, such as with phlebotomists coming from outside, will check labs only if needed.      ASSESSMENT/PLAN:  (R53.83) Fatigue, unspecified type  (primary encounter diagnosis)  Comment: There is no evidence of any acute illness. This is likely cyclical behavior related to her mental illness  Plan: Monitor for any acute changes, s/sx illness    (G31.83,  F02.80) Lewy body dementia without behavioral disturbance (H)  Comment: Chronic, progressive. Needs 24 hour skilled nursing care. HPI/ROS difficult to obtain with cognitive impairment. Expect further functional and cognitive decline. Expect weight loss.  Plan: Continue 24 hour care. Monitor weight. Monitor functional status. Monitor for behavioral disturbances.    (F31.9) Bipolar affective disorder, remission status unspecified (H)  Comment: Chronic condition being managed with medications and continues to have expected and unavoidable exacerbations with current therapy.  Plan: Continue current POC with no changes at this time and adjustments as needed.    (K21.9) Gastroesophageal reflux disease without esophagitis  Comment: Chronic condition being managed with medications and is currently asymptomatic.  Plan: Continue current POC with no changes at this time and adjustments as needed.      Electronically signed by:  ASCENCION Pope CNP   [facility]  Phone: [phone]      Video-Visit Details  Type of service:  Video Visit  Video End Time (time video stopped): 2:37pm  Distant Location (provider location):  [facility]

## 2020-06-12 ENCOUNTER — NURSING HOME VISIT (OUTPATIENT)
Dept: GERIATRICS | Facility: CLINIC | Age: 85
End: 2020-06-12
Payer: COMMERCIAL

## 2020-06-12 DIAGNOSIS — F31.9 BIPOLAR AFFECTIVE DISORDER, REMISSION STATUS UNSPECIFIED (H): ICD-10-CM

## 2020-06-12 DIAGNOSIS — F02.80 LEWY BODY DEMENTIA WITHOUT BEHAVIORAL DISTURBANCE (H): Primary | ICD-10-CM

## 2020-06-12 DIAGNOSIS — R53.83 FATIGUE, UNSPECIFIED TYPE: ICD-10-CM

## 2020-06-12 DIAGNOSIS — G31.83 LEWY BODY DEMENTIA WITHOUT BEHAVIORAL DISTURBANCE (H): Primary | ICD-10-CM

## 2020-06-13 NOTE — PROGRESS NOTES
"Patient was seen for a regulatory long-term-care visit.    The patient and/or legal representative have been notified of following:  \"This video visit will be conducted via a call between you and your provider. We have found that certain health care needs can be provided without the need for an in-person physical exam. This service lets us provide the care you need with a video conversation. If during the course of the call the provider feels a video visit is not appropriate, you will not be charged for this service.\"   The provider has received verbal consent for a Video Visit from the patient or first contact? Yes  Video Time: 9984-1021  Provider's Distant Location: [facility]     Course reviewed with nursing staff.  There have been no recent changes in status.    Patient complains of fatigue and of feeling cold.  Staff notes these are chronic concerns.  She continues to spend most of the day in bed.    She denies headache, cough, chest pain, shortness of breath, abdominal pain, nausea.    Exam  Lying in bed, covered up in blankets.  Face symmetric  Affect blunted  No tremors  Respirations unlabored.      Assessment    Lewy body dementia with gradual progressive functional and mental status decline    Bipolar affect disorder, appears stable in the context of dementia.  Patient remains on Aricept, Lamictal, Seroquel, Zoloft, Remeron    Chronic complaints of fatigue, most likely related to dementia, possibly medications.  Most recent CBC in August 2019 was unremarkable.  No recent thyroid function tests.     Plan  Routine lab testing including thyroid function test, comprehensive metabolic panel  Psychiatry follow-up when possible  "

## 2020-06-23 ENCOUNTER — DOCUMENTATION ONLY (OUTPATIENT)
Dept: GERIATRICS | Facility: CLINIC | Age: 85
End: 2020-06-23

## 2020-08-14 ENCOUNTER — VIRTUAL VISIT (OUTPATIENT)
Dept: GERIATRICS | Facility: CLINIC | Age: 85
End: 2020-08-14
Payer: COMMERCIAL

## 2020-08-14 VITALS
DIASTOLIC BLOOD PRESSURE: 72 MMHG | SYSTOLIC BLOOD PRESSURE: 136 MMHG | OXYGEN SATURATION: 98 % | WEIGHT: 135.5 LBS | HEART RATE: 80 BPM | RESPIRATION RATE: 20 BRPM | TEMPERATURE: 98.2 F | HEIGHT: 62 IN | BODY MASS INDEX: 24.93 KG/M2

## 2020-08-14 DIAGNOSIS — F31.9 BIPOLAR AFFECTIVE DISORDER, REMISSION STATUS UNSPECIFIED (H): ICD-10-CM

## 2020-08-14 DIAGNOSIS — K21.9 GASTROESOPHAGEAL REFLUX DISEASE WITHOUT ESOPHAGITIS: ICD-10-CM

## 2020-08-14 DIAGNOSIS — G31.83 LEWY BODY DEMENTIA WITHOUT BEHAVIORAL DISTURBANCE (H): ICD-10-CM

## 2020-08-14 DIAGNOSIS — F33.2 SEVERE EPISODE OF RECURRENT MAJOR DEPRESSIVE DISORDER, WITHOUT PSYCHOTIC FEATURES (H): ICD-10-CM

## 2020-08-14 DIAGNOSIS — F02.80 LEWY BODY DEMENTIA WITHOUT BEHAVIORAL DISTURBANCE (H): ICD-10-CM

## 2020-08-14 DIAGNOSIS — R53.83 FATIGUE, UNSPECIFIED TYPE: Primary | ICD-10-CM

## 2020-08-14 PROCEDURE — 99309 SBSQ NF CARE MODERATE MDM 30: CPT | Mod: 95 | Performed by: NURSE PRACTITIONER

## 2020-08-14 ASSESSMENT — MIFFLIN-ST. JEOR: SCORE: 997.87

## 2020-08-14 NOTE — PROGRESS NOTES
"[facility] Reg[facility]   Maribel Sevilla is being evaluated via a billable video visit due to the restrictions of the Covid-19 pandemic and facility request that providers do not come into the building at this time.   The patient has been notified of following:  \"This video visit will be conducted via a call between you and your provider. We have found that certain health care needs can be provided without the need for an in-person physical exam.  This service lets us provide the care you need with a video conversation. If during the course of the call the provider feels a video visit is not appropriate, you will not be charged for this service.\"   The provider has received verbal consent for a Video Visit from the patient or first contact? Yes  Patient or facility staff would like the video invitation sent by: N/A   Video Start Time: 11:21am    [place] Medical Record Number:  [id number]  Place of Location at the time of visit: [facility]  Chief Complaint   Patient presents with     snf Regulatory     HPI:  Maribel Sevilla  is a 88 year old (12/20/1931), who is being seen today for a Regulatory visit.  HPI information obtained from: facility chart records, facility staff, patient report and [facility] Epic chart review.     Today's concern is:  Fatigue, unspecified type  Lewy body dementia without behavioral disturbance (H)  Bipolar affective disorder, remission status unspecified (H)  Severe episode of recurrent major depressive disorder, without psychotic features (H)  Patient goes through episodes regularly of laying in bed, refusing to get up, refusing cares, keeping her eyes closed. She has been doing this this week. She does engage with provider, but just says she feels lousy. She denies fever, chills, cough, SOB, chest pain, dysuria, nausea, vomiting. She is not sure if her bowels are moving ok. She is followed closely by ACP in house.    Gastroesophageal reflux disease without " esophagitis  Denies dyspepsia      Case Management and Team Discussion:  I spoke with Nursing staff at the facility regarding the current Plan of Care. I have reviewed the facility/SNF care plan/MDS, including the falls risk, nutrition and pain screening. I also reviewed the current immunizations, and preventive care.Patient's desire to return to the community is not present. Current Level of Care is appropriate at this time. The Plan of Care is appropriate at this time. I reviewed the current advanced directives as reflected in EPIC, the POLST and the facility chart, and verified the congruency of orders.       Past Medical and Surgical History reviewed in Epic today.  MEDICATIONS:    Current Outpatient Medications   Medication Sig Dispense Refill     acetaminophen (TYLENOL) 500 MG tablet Take 500 mg by mouth 2 times daily and 1 tab every 4 hours as needed       donepezil (ARICEPT) 5 MG tablet Take 5 mg by mouth 2 times daily  30 tablet 0     ferrous sulfate (IRON) 325 (65 Fe) MG tablet Take 325 mg by mouth daily every other day       lamoTRIgine (LAMICTAL) 200 MG tablet Take 1 tablet (200 mg) by mouth every morning       mineral oil OIL Place 2 drops into both ears daily       mirtazapine (REMERON) 15 MG tablet Take 30 mg by mouth At Bedtime        Multiple Vitamins-Minerals (PRESERVISION AREDS 2 PO) Take 1 tablet by mouth daily       ondansetron (ZOFRAN) 4 MG tablet Take 4 mg by mouth 4 times daily        pantoprazole (PROTONIX) 40 MG EC tablet Take 40 mg by mouth daily        QUEtiapine (SEROQUEL) 50 MG tablet Take 150 mg by mouth At Bedtime        senna-docusate (SENNA S) 8.6-50 MG per tablet Take 2 tablets by mouth At Bedtime       sertraline (ZOLOFT) 50 MG tablet Take 200 mg by mouth daily        REVIEW OF SYSTEMS: 10 point ROS of systems including Constitutional, Eyes, Respiratory, Cardiovascular, Gastroenterology, Genitourinary, Integumentary, Musculoskeletal, Psychiatric were all negative except for  "pertinent positives noted in my HPI.    Objective: /72   Pulse 80   Temp 98.2  F (36.8  C)   Resp 20   Ht 1.575 m (5' 2\")   Wt 61.5 kg (135 lb 8 oz)   SpO2 98%   BMI 24.78 kg/m    Limited visit exam done given COVID-19 precautions.   GENERAL APPEARANCE:  Alert, in no distress  EYES:  EOM normal, Conjunctiva and lids normal  RESP:  no respiratory distress  PSYCH:  insight and judgement impaired, memory impaired , affect abnormal flat     Labs:   Recent labs in [facility] reviewed by me today.       ASSESSMENT/PLAN:  (R53.83) Fatigue, unspecified type  (primary encounter diagnosis)  Comment: No s/sx acute illness. This is most likely related to her mental health  Plan: Continue current POC with no changes at this time and adjustments as needed.    (G31.83,  F02.80) Lewy body dementia without behavioral disturbance (H)  Comment: Chronic, progressive. Needs 24 hour skilled nursing care. HPI/ROS difficult to obtain with cognitive impairment. Expect further functional and cognitive decline. Expect weight loss.  Plan: Continue 24 hour care. Monitor weight. Monitor functional status. Monitor for behavioral disturbances.    (F31.9) Bipolar affective disorder, remission status unspecified (H)  (F33.2) Severe episode of recurrent major depressive disorder, without psychotic features (H)  Comment: Chronic condition being managed with medications, behavioral therapy and continues to have expected and unavoidable exacerbations with current therapy.  Plan: Continue current POC with no changes at this time and adjustments as needed.    (K21.9) Gastroesophageal reflux disease without esophagitis  Comment: Chronic condition being managed with medications and is currently asymptomatic.  Plan: Continue current POC with no changes at this time and adjustments as needed.    Electronically signed by:  ASCENCION Pope [facility]  Phone: [phone]      Video-Visit Details  Type of service:  Video " Visit  Video End Time (time video stopped): 11:23am  Distant Location (provider location):  [facility]

## 2020-08-14 NOTE — LETTER
"    8/14/2020        RE: Maribel Sevilla  [facility]  [address] [facility] [address]        [place] [facility] Regulatory   Maribel Sevilla is being evaluated via a billable video visit due to the restrictions of the Covid-19 pandemic and facility request that providers do not come into the building at this time.   The patient has been notified of following:  \"This video visit will be conducted via a call between you and your provider. We have found that certain health care needs can be provided without the need for an in-person physical exam.  This service lets us provide the care you need with a video conversation. If during the course of the call the provider feels a video visit is not appropriate, you will not be charged for this service.\"   The provider has received verbal consent for a Video Visit from the patient or first contact? Yes  Patient or facility staff would like the video invitation sent by: N/A   Video Start Time: 11:21am    [place] [facility] Medical Record Number:  [id number]  Place of Location at the time of visit: [facility]  Chief Complaint   Patient presents with     shelter Regulatory     HPI:  Maribel Sevilla  is a 88 year old (12/20/1931), who is being seen today for a Regulatory visit.  HPI information obtained from: facility chart records, facility staff, patient report and [facility] Epic chart review.     Today's concern is:  Fatigue, unspecified type  Lewy body dementia without behavioral disturbance (H)  Bipolar affective disorder, remission status unspecified (H)  Severe episode of recurrent major depressive disorder, without psychotic features (H)  Patient goes through episodes regularly of laying in bed, refusing to get up, refusing cares, keeping her eyes closed. She has been doing this this week. She does engage with provider, but just says she feels lousy. She denies fever, chills, cough, SOB, chest pain, dysuria, nausea, vomiting. She is " not sure if her bowels are moving ok. She is followed closely by ACP in house.    Gastroesophageal reflux disease without esophagitis  Denies dyspepsia      Case Management and Team Discussion:  I spoke with Nursing staff at the facility regarding the current Plan of Care. I have reviewed the facility/SNF care plan/MDS, including the falls risk, nutrition and pain screening. I also reviewed the current immunizations, and preventive care.Patient's desire to return to the community is not present. Current Level of Care is appropriate at this time. The Plan of Care is appropriate at this time. I reviewed the current advanced directives as reflected in EPIC, the POLST and the facility chart, and verified the congruency of orders.       Past Medical and Surgical History reviewed in Epic today.  MEDICATIONS:    Current Outpatient Medications   Medication Sig Dispense Refill     acetaminophen (TYLENOL) 500 MG tablet Take 500 mg by mouth 2 times daily and 1 tab every 4 hours as needed       donepezil (ARICEPT) 5 MG tablet Take 5 mg by mouth 2 times daily  30 tablet 0     ferrous sulfate (IRON) 325 (65 Fe) MG tablet Take 325 mg by mouth daily every other day       lamoTRIgine (LAMICTAL) 200 MG tablet Take 1 tablet (200 mg) by mouth every morning       mineral oil OIL Place 2 drops into both ears daily       mirtazapine (REMERON) 15 MG tablet Take 30 mg by mouth At Bedtime        Multiple Vitamins-Minerals (PRESERVISION AREDS 2 PO) Take 1 tablet by mouth daily       ondansetron (ZOFRAN) 4 MG tablet Take 4 mg by mouth 4 times daily        pantoprazole (PROTONIX) 40 MG EC tablet Take 40 mg by mouth daily        QUEtiapine (SEROQUEL) 50 MG tablet Take 150 mg by mouth At Bedtime        senna-docusate (SENNA S) 8.6-50 MG per tablet Take 2 tablets by mouth At Bedtime       sertraline (ZOLOFT) 50 MG tablet Take 200 mg by mouth daily        REVIEW OF SYSTEMS: 10 point ROS of systems including Constitutional, Eyes, Respiratory,  "Cardiovascular, Gastroenterology, Genitourinary, Integumentary, Musculoskeletal, Psychiatric were all negative except for pertinent positives noted in my HPI.    Objective: /72   Pulse 80   Temp 98.2  F (36.8  C)   Resp 20   Ht 1.575 m (5' 2\")   Wt 61.5 kg (135 lb 8 oz)   SpO2 98%   BMI 24.78 kg/m    Limited visit exam done given COVID-19 precautions.   GENERAL APPEARANCE:  Alert, in no distress  EYES:  EOM normal, Conjunctiva and lids normal  RESP:  no respiratory distress  PSYCH:  insight and judgement impaired, memory impaired , affect abnormal flat     Labs:   Recent labs in EPIC reviewed by me today.       ASSESSMENT/PLAN:  (R53.83) Fatigue, unspecified type  (primary encounter diagnosis)  Comment: No s/sx acute illness. This is most likely related to her mental health  Plan: Continue current POC with no changes at this time and adjustments as needed.    (G31.83,  F02.80) Lewy body dementia without behavioral disturbance (H)  Comment: Chronic, progressive. Needs 24 hour skilled nursing care. HPI/ROS difficult to obtain with cognitive impairment. Expect further functional and cognitive decline. Expect weight loss.  Plan: Continue 24 hour care. Monitor weight. Monitor functional status. Monitor for behavioral disturbances.    (F31.9) Bipolar affective disorder, remission status unspecified (H)  (F33.2) Severe episode of recurrent major depressive disorder, without psychotic features (H)  Comment: Chronic condition being managed with medications, behavioral therapy and continues to have expected and unavoidable exacerbations with current therapy.  Plan: Continue current POC with no changes at this time and adjustments as needed.    (K21.9) Gastroesophageal reflux disease without esophagitis  Comment: Chronic condition being managed with medications and is currently asymptomatic.  Plan: Continue current POC with no changes at this time and adjustments as needed.    Electronically signed by:  Doreen Bruno " [facility]  Phone: [phone]      Video-Visit Details  Type of service:  Video Visit  Video End Time (time video stopped): 11:23am  Distant Location (provider location):  [facility]

## 2020-10-19 ENCOUNTER — NURSING HOME VISIT (OUTPATIENT)
Dept: GERIATRICS | Facility: CLINIC | Age: 85
End: 2020-10-19
Payer: COMMERCIAL

## 2020-10-19 DIAGNOSIS — F02.80 LEWY BODY DEMENTIA WITHOUT BEHAVIORAL DISTURBANCE (H): ICD-10-CM

## 2020-10-19 DIAGNOSIS — G31.83 LEWY BODY DEMENTIA WITHOUT BEHAVIORAL DISTURBANCE (H): ICD-10-CM

## 2020-10-19 DIAGNOSIS — R53.83 FATIGUE, UNSPECIFIED TYPE: Primary | ICD-10-CM

## 2020-10-19 NOTE — PROGRESS NOTES
"Patient was seen for regulatory long-term care visit at the [facility]    The patient and/or legal representative have been notified of following:  \"This video visit will be conducted via a call between you and your provider. We have found that certain health care needs can be provided without the need for an in-person physical exam. This service lets us provide the care you need with a video conversation. If during the course of the call the provider feels a video visit is not appropriate, you will not be charged for this service.\"   The provider has received verbal consent for a Video Visit from the patient or first contact? Yes  Video Time: [id number]  Provider's Distant Location: [facility] Office.    Patient was seen with nurse    She denies acute concerns.  She notes feeling fatigued at times but feels this may be improved recently.    Exam  Patient appears well, sitting in her room.  She is in good spirits, appears very well groomed.  She is oriented to person and place.  Respirations 12, unlabored  No tremor or rigidity  No lower extremity edema.      Assessment    Lewy body dementia, stable mental functional status    Bipolar affect disorder, stable on Seroquel and sertraline, Lamictal and Remeron    Intermittent fatigue etiology unclear, currently improved.    GERD, stable on Protonix    Plan: Continue current cares.    "

## 2020-12-11 ENCOUNTER — VIRTUAL VISIT (OUTPATIENT)
Dept: GERIATRICS | Facility: CLINIC | Age: 85
End: 2020-12-11
Payer: COMMERCIAL

## 2020-12-11 VITALS
WEIGHT: 138 LBS | TEMPERATURE: 98.3 F | HEIGHT: 62 IN | RESPIRATION RATE: 18 BRPM | HEART RATE: 84 BPM | BODY MASS INDEX: 25.4 KG/M2 | SYSTOLIC BLOOD PRESSURE: 123 MMHG | DIASTOLIC BLOOD PRESSURE: 79 MMHG | OXYGEN SATURATION: 93 %

## 2020-12-11 DIAGNOSIS — G31.83 LEWY BODY DEMENTIA WITHOUT BEHAVIORAL DISTURBANCE (H): ICD-10-CM

## 2020-12-11 DIAGNOSIS — Z13.6 SCREENING FOR HYPERTENSION: ICD-10-CM

## 2020-12-11 DIAGNOSIS — F33.2 SEVERE EPISODE OF RECURRENT MAJOR DEPRESSIVE DISORDER, WITHOUT PSYCHOTIC FEATURES (H): ICD-10-CM

## 2020-12-11 DIAGNOSIS — F02.80 LEWY BODY DEMENTIA WITHOUT BEHAVIORAL DISTURBANCE (H): ICD-10-CM

## 2020-12-11 DIAGNOSIS — F31.9 BIPOLAR AFFECTIVE DISORDER, REMISSION STATUS UNSPECIFIED (H): ICD-10-CM

## 2020-12-11 DIAGNOSIS — N18.4 CKD (CHRONIC KIDNEY DISEASE) STAGE 4, GFR 15-29 ML/MIN (H): ICD-10-CM

## 2020-12-11 DIAGNOSIS — R53.83 FATIGUE, UNSPECIFIED TYPE: Primary | ICD-10-CM

## 2020-12-11 DIAGNOSIS — K21.9 GASTROESOPHAGEAL REFLUX DISEASE WITHOUT ESOPHAGITIS: ICD-10-CM

## 2020-12-11 PROCEDURE — 99309 SBSQ NF CARE MODERATE MDM 30: CPT | Mod: 95 | Performed by: NURSE PRACTITIONER

## 2020-12-11 ASSESSMENT — MIFFLIN-ST. JEOR: SCORE: 1009.21

## 2020-12-11 NOTE — LETTER
"    12/11/2020        RE: Maribel Sevilla  [facility]  [address] [facility] [address]        [place] [facility]  Type of service: Video Visit  Maribel Sevilla is being evaluated via a billable visit.   The patient or first contact has been notified of following:  \"This video visit will be conducted via a call between you and your provider. We have found that certain health care needs can be provided without the need for an in-person physical exam.  This service lets us provide the care you need with a video conversation. If during the course of the call the provider feels a video visit is not appropriate, you will not be charged for this service.\"   The provider has received verbal consent for a Video or Telephone Visit from the patient and or first contact? Yes  Video or Telephone Start Time: 10:10am  [place] Medical Record Number:  [id number]  Where the Patient is living now: [facility]  Chief Complaint   Patient presents with     Annual Comprehensive Nursing Home     HPI:    Maribel Sevilla  is a 88 year old  (12/20/1931), who is being seen today for an annual comprehensive visit. HPI information obtained from: facility chart records, facility staff, patient report and [facility] chart review.      Today's concerns are:  Fatigue, unspecified type  Lewy body dementia without behavioral disturbance (H)  Bipolar affective disorder, remission status unspecified (H)  Severe episode of recurrent major depressive disorder, without psychotic features (H)  Patient goes through episodes of refusing to get out of bed, refusing cares, keeping eyes closed. Today she is awake, sitting in a chair, combing her hair. She says that she was being very lazy for awhile and decided it was time to get out of bed once in a while. She says she feels good    Gastroesophageal reflux disease without esophagitis  Denies dyspepsia    CKD (chronic kidney disease) stage 4, GFR 15-29 ml/min " (H)  Creatinine   Date Value Ref Range Status   07/24/2019 2.69 (A) 0.55 - 1.02 mg/dL Final       Screening for hypertension  -130s/60-70s      ALLERGIES: No known allergies  PAST MEDICAL HISTORY:  has a past medical history of Anemia, Anxiety, Bipolar affective (H), Dementia (H), Depressive disorder, Gastro-oesophageal reflux disease, OA (osteoarthritis) of knee, and Renal insufficiencies, chronic.  PAST SURGICAL HISTORY:  has a past surgical history that includes Salpingo-oophorectomy bilateral; Hysterectomy; and Eye surgery.  IMMUNIZATIONS:  Immunization History   Administered Date(s) Administered     Influenza (IIV3) PF 10/30/2014, 10/05/2015, 10/18/2016, 10/02/2017, 09/28/2018, 10/03/2019     Mantoux Tuberculin Skin Test 08/20/2011     Pneumo Conj 13-V (2010&after) 10/05/2015     Pneumococcal 23 valent 08/20/2011, 10/23/2013     TD (ADULT, 7+) 10/29/2011, 10/30/2011, 03/15/2017     Above immunizations pulled from [employer] [facility]. [facility] and facility records also reconciled. Outstanding information sent to  to update [facility].  Future immunizations are not needed at this point as all recommended immunizations are up to date.     Current Outpatient Medications   Medication Sig Dispense Refill     acetaminophen (TYLENOL) 500 MG tablet Take 500 mg by mouth 2 times daily and 1 tab every 4 hours as needed       donepezil (ARICEPT) 5 MG tablet Take 5 mg by mouth 2 times daily  30 tablet 0     ferrous sulfate (IRON) 325 (65 Fe) MG tablet Take 325 mg by mouth daily every other day       lamoTRIgine (LAMICTAL) 200 MG tablet Take 1 tablet (200 mg) by mouth every morning       mineral oil OIL Place 2 drops into both ears daily       mirtazapine (REMERON) 15 MG tablet Take 30 mg by mouth At Bedtime        Multiple Vitamins-Minerals (PRESERVISION AREDS 2 PO) Take 1 tablet by mouth daily       ondansetron (ZOFRAN) 4 MG tablet Take 4 mg by mouth 4 times daily        pantoprazole (PROTONIX) 40 MG EC  "tablet Take 40 mg by mouth daily        QUEtiapine (SEROQUEL) 50 MG tablet Take 150 mg by mouth At Bedtime        senna-docusate (SENNA S) 8.6-50 MG per tablet Take 2 tablets by mouth At Bedtime       sertraline (ZOLOFT) 50 MG tablet Take 200 mg by mouth daily          Case Management and Team Discussion:  I spoke with Nursing staff at the facility regarding the current Plan of Care. I have reviewed the facility/SNF care plan/MDS, including the falls risk, nutrition and pain screening. I also reviewed the current immunizations, and preventive care.Patient's desire to return to the community is not present. Current Level of Care is appropriate at this time. The Plan of Care is appropriate at this time.     Advance Directive Discussion:    I reviewed the current advanced directives as reflected in EPIC, the POLST and the facility chart, and verified the congruency of orders. I contacted the first party  and left a message regarding the plan of Care.  I did not due to cognitive impairment review the advance directives with the resident.       ROS:  10 point ROS of systems including Constitutional, Eyes, Respiratory, Cardiovascular, Gastroenterology, Genitourinary, Integumentary, Musculoskeletal, Psychiatric were all negative except for pertinent positives noted in my HPI.    Vitals:  /79   Pulse 84   Temp 98.3  F (36.8  C)   Resp 18   Ht 1.575 m (5' 2\")   Wt 62.6 kg (138 lb)   SpO2 93%   BMI 25.24 kg/m   Body mass index is 25.24 kg/m .  Exam:  Observation only exam done due to COVID precautionary measures and video visit format  GENERAL APPEARANCE:  Alert, in no distress, appears healthy  ENT:  Mouth and posterior oropharynx normal, moist mucous membranes, normal hearing acuity  EYES:  EOM normal, Conjunctiva and lids normal  RESP:  no respiratory distress  PSYCH:  smiling, pleasant. memory, insight, and judgement impaired     Lab/Diagnostic data:   Recent labs in [facility] reviewed by me today. "     ASSESSMENT/PLAN  (R53.83) Fatigue, unspecified type  (primary encounter diagnosis)  (G31.83,  F02.80) Lewy body dementia without behavioral disturbance (H)  (F31.9) Bipolar affective disorder, remission status unspecified (H)  (F33.2) Severe episode of recurrent major depressive disorder, without psychotic features (H)  Comment: Chronic. Patient appears healthy and in good spirits today. Hopefully this will last a while for her. Expect she will continue to have expected and unavoidable exacerbations with current therapy.  Plan: Continue 24 hour care. Monitor weight. Monitor functional status. Monitor for behavioral disturbances.    (K21.9) Gastroesophageal reflux disease without esophagitis  Comment: Asymptomatic  Plan: Continue current POC with no changes at this time and adjustments as needed.    (N18.4) CKD (chronic kidney disease) stage 4, GFR 15-29 ml/min (H)  Comment: Chronic Kidney Disease. Goal is comfort care, no dialysis  Plan: Avoid nephrotoxic medications and adjust medications per renal function.     (Z13.6) Screening for hypertension  Comment: BP goals are <150/90 mm Hg.This is higher than ACC and AHA recommendations due to goals of care, risk for hypotension, risk of dizziness and falls and risk of tissue/cerebral hypoperfusion. Patient is stable and continue without pharmacological invention with routine assessment.      Electronically signed by:  ASCENCION Pope CNP       Visit Details  Video or Telephone End Time: 10:14am  Distant Location (provider location):  [facility]

## 2020-12-11 NOTE — PROGRESS NOTES
"[facility]  Type of service: Video Visit  Maribel Sevilla is being evaluated via a billable visit.   The patient or first contact has been notified of following:  \"This video visit will be conducted via a call between you and your provider. We have found that certain health care needs can be provided without the need for an in-person physical exam.  This service lets us provide the care you need with a video conversation. If during the course of the call the provider feels a video visit is not appropriate, you will not be charged for this service.\"   The provider has received verbal consent for a Video or Telephone Visit from the patient and or first contact? Yes  Video or Telephone Start Time: 10:10am  [facility] Medical Record Number:  [id number]  Where the Patient is living now: [facility]  Chief Complaint   Patient presents with     Annual Comprehensive Nursing Home     HPI:    Maribel Sevilla  is a 88 year old  (12/20/1931), who is being seen today for an annual comprehensive visit. HPI information obtained from: facility chart records, facility staff, patient report and [facility] chart review.      Today's concerns are:  Fatigue, unspecified type  Lewy body dementia without behavioral disturbance (H)  Bipolar affective disorder, remission status unspecified (H)  Severe episode of recurrent major depressive disorder, without psychotic features (H)  Patient goes through episodes of refusing to get out of bed, refusing cares, keeping eyes closed. Today she is awake, sitting in a chair, combing her hair. She says that she was being very lazy for awhile and decided it was time to get out of bed once in a while. She says she feels good    Gastroesophageal reflux disease without esophagitis  Denies dyspepsia    CKD (chronic kidney disease) stage 4, GFR 15-29 ml/min (H)  Creatinine   Date Value Ref Range Status   07/24/2019 2.69 (A) 0.55 - 1.02 mg/dL Final       Screening for hypertension  BP " 120-130s/60-70s      ALLERGIES: No known allergies  PAST MEDICAL HISTORY:  has a past medical history of Anemia, Anxiety, Bipolar affective (H), Dementia (H), Depressive disorder, Gastro-oesophageal reflux disease, OA (osteoarthritis) of knee, and Renal insufficiencies, chronic.  PAST SURGICAL HISTORY:  has a past surgical history that includes Salpingo-oophorectomy bilateral; Hysterectomy; and Eye surgery.  IMMUNIZATIONS:  Immunization History   Administered Date(s) Administered     Influenza (IIV3) PF 10/30/2014, 10/05/2015, 10/18/2016, 10/02/2017, 09/28/2018, 10/03/2019     Mantoux Tuberculin Skin Test 08/20/2011     Pneumo Conj 13-V (2010&after) 10/05/2015     Pneumococcal 23 valent 08/20/2011, 10/23/2013     TD (ADULT, 7+) 10/29/2011, 10/30/2011, 03/15/2017     Above immunizations pulled from [employer]. [facility] and facility records also reconciled. Outstanding information sent to  to update [employer].  Future immunizations are not needed at this point as all recommended immunizations are up to date.     Current Outpatient Medications   Medication Sig Dispense Refill     acetaminophen (TYLENOL) 500 MG tablet Take 500 mg by mouth 2 times daily and 1 tab every 4 hours as needed       donepezil (ARICEPT) 5 MG tablet Take 5 mg by mouth 2 times daily  30 tablet 0     ferrous sulfate (IRON) 325 (65 Fe) MG tablet Take 325 mg by mouth daily every other day       lamoTRIgine (LAMICTAL) 200 MG tablet Take 1 tablet (200 mg) by mouth every morning       mineral oil OIL Place 2 drops into both ears daily       mirtazapine (REMERON) 15 MG tablet Take 30 mg by mouth At Bedtime        Multiple Vitamins-Minerals (PRESERVISION AREDS 2 PO) Take 1 tablet by mouth daily       ondansetron (ZOFRAN) 4 MG tablet Take 4 mg by mouth 4 times daily        pantoprazole (PROTONIX) 40 MG EC tablet Take 40 mg by mouth daily        QUEtiapine (SEROQUEL) 50 MG tablet Take 150 mg by mouth At Bedtime        senna-docusate  "(SENNA S) 8.6-50 MG per tablet Take 2 tablets by mouth At Bedtime       sertraline (ZOLOFT) 50 MG tablet Take 200 mg by mouth daily          Case Management and Team Discussion:  I spoke with Nursing staff at the facility regarding the current Plan of Care. I have reviewed the facility/SNF care plan/MDS, including the falls risk, nutrition and pain screening. I also reviewed the current immunizations, and preventive care.Patient's desire to return to the community is not present. Current Level of Care is appropriate at this time. The Plan of Care is appropriate at this time.     Advance Directive Discussion:    I reviewed the current advanced directives as reflected in EPIC, the POLST and the facility chart, and verified the congruency of orders. I contacted the first party  and left a message regarding the plan of Care.  I did not due to cognitive impairment review the advance directives with the resident.       ROS:  10 point ROS of systems including Constitutional, Eyes, Respiratory, Cardiovascular, Gastroenterology, Genitourinary, Integumentary, Musculoskeletal, Psychiatric were all negative except for pertinent positives noted in my HPI.    Vitals:  /79   Pulse 84   Temp 98.3  F (36.8  C)   Resp 18   Ht 1.575 m (5' 2\")   Wt 62.6 kg (138 lb)   SpO2 93%   BMI 25.24 kg/m   Body mass index is 25.24 kg/m .  Exam:  Observation only exam done due to COVID precautionary measures and video visit format  GENERAL APPEARANCE:  Alert, in no distress, appears healthy  ENT:  Mouth and posterior oropharynx normal, moist mucous membranes, normal hearing acuity  EYES:  EOM normal, Conjunctiva and lids normal  RESP:  no respiratory distress  PSYCH:  smiling, pleasant. memory, insight, and judgement impaired     Lab/Diagnostic data:   Recent labs in EPIC reviewed by me today.     ASSESSMENT/PLAN  (R53.83) Fatigue, unspecified type  (primary encounter diagnosis)  (G31.83,  F02.80) Lewy body dementia without behavioral " disturbance (H)  (F31.9) Bipolar affective disorder, remission status unspecified (H)  (F33.2) Severe episode of recurrent major depressive disorder, without psychotic features (H)  Comment: Chronic. Patient appears healthy and in good spirits today. Hopefully this will last a while for her. Expect she will continue to have expected and unavoidable exacerbations with current therapy.  Plan: Continue 24 hour care. Monitor weight. Monitor functional status. Monitor for behavioral disturbances.    (K21.9) Gastroesophageal reflux disease without esophagitis  Comment: Asymptomatic  Plan: Continue current POC with no changes at this time and adjustments as needed.    (N18.4) CKD (chronic kidney disease) stage 4, GFR 15-29 ml/min (H)  Comment: Chronic Kidney Disease. Goal is comfort care, no dialysis  Plan: Avoid nephrotoxic medications and adjust medications per renal function.     (Z13.6) Screening for hypertension  Comment: BP goals are <150/90 mm Hg.This is higher than ACC and AHA recommendations due to goals of care, risk for hypotension, risk of dizziness and falls and risk of tissue/cerebral hypoperfusion. Patient is stable and continue without pharmacological invention with routine assessment.      Electronically signed by:  ASCENCION Pope CNP       Visit Details  Video or Telephone End Time: 10:14am  Distant Location (provider location):  [place] [facility]

## 2021-02-16 ENCOUNTER — NURSING HOME VISIT (OUTPATIENT)
Dept: GERIATRICS | Facility: CLINIC | Age: 86
End: 2021-02-16
Payer: COMMERCIAL

## 2021-02-16 DIAGNOSIS — F31.9 BIPOLAR AFFECTIVE DISORDER, REMISSION STATUS UNSPECIFIED (H): ICD-10-CM

## 2021-02-16 DIAGNOSIS — F02.80 LEWY BODY DEMENTIA WITHOUT BEHAVIORAL DISTURBANCE (H): ICD-10-CM

## 2021-02-16 DIAGNOSIS — G31.83 LEWY BODY DEMENTIA WITHOUT BEHAVIORAL DISTURBANCE (H): ICD-10-CM

## 2021-02-16 DIAGNOSIS — R53.83 FATIGUE, UNSPECIFIED TYPE: Primary | ICD-10-CM

## 2021-02-17 NOTE — PROGRESS NOTES
Pt was seen for a regulatory LTC visit    Case reviewed with NP    There have been no acute concerns    Pt states she feels tired, has no other specific physical concerns. She denies pain, dizziness, abd pain, cough    VSS  Alert, sitting in chair in room, with blanket over entire body except face  Minimally interactive  Lungs clear  CV rrr    Assessment    Lewy Body Dementia, stable functional status    Bipolar disorder, on lamictal, seroquel, remeron    Chronic c/o fatigue, likely secondary to above    Plan  Continue current tx.

## 2021-04-15 ENCOUNTER — NURSING HOME VISIT (OUTPATIENT)
Dept: GERIATRICS | Facility: CLINIC | Age: 86
End: 2021-04-15
Payer: COMMERCIAL

## 2021-04-15 VITALS
HEIGHT: 62 IN | RESPIRATION RATE: 16 BRPM | HEART RATE: 68 BPM | DIASTOLIC BLOOD PRESSURE: 68 MMHG | TEMPERATURE: 98 F | OXYGEN SATURATION: 93 % | BODY MASS INDEX: 25.4 KG/M2 | SYSTOLIC BLOOD PRESSURE: 122 MMHG | WEIGHT: 138 LBS

## 2021-04-15 DIAGNOSIS — N18.4 CKD (CHRONIC KIDNEY DISEASE) STAGE 4, GFR 15-29 ML/MIN (H): Primary | ICD-10-CM

## 2021-04-15 DIAGNOSIS — F02.80 LEWY BODY DEMENTIA WITHOUT BEHAVIORAL DISTURBANCE (H): ICD-10-CM

## 2021-04-15 DIAGNOSIS — F31.9 BIPOLAR AFFECTIVE DISORDER, REMISSION STATUS UNSPECIFIED (H): ICD-10-CM

## 2021-04-15 DIAGNOSIS — K21.9 GASTROESOPHAGEAL REFLUX DISEASE WITHOUT ESOPHAGITIS: ICD-10-CM

## 2021-04-15 DIAGNOSIS — G31.83 LEWY BODY DEMENTIA WITHOUT BEHAVIORAL DISTURBANCE (H): ICD-10-CM

## 2021-04-15 DIAGNOSIS — F33.2 SEVERE EPISODE OF RECURRENT MAJOR DEPRESSIVE DISORDER, WITHOUT PSYCHOTIC FEATURES (H): ICD-10-CM

## 2021-04-15 PROCEDURE — 99309 SBSQ NF CARE MODERATE MDM 30: CPT | Performed by: NURSE PRACTITIONER

## 2021-04-15 ASSESSMENT — MIFFLIN-ST. JEOR: SCORE: 1004.21

## 2021-04-15 NOTE — PROGRESS NOTES
[place] [facility]  Chief Complaint   Patient presents with     alf Regulatory     [place] Medical Record Number:  [id number]  Place of Service where encounter took place:  [facility] () [[id number]]    HPI:    Maribel Sevilla  is 89 year old (12/20/1931), who is being seen today for a federally mandated E/M visit.  HPI information obtained from: facility chart records, facility staff and patient report.     Today's concerns are:  CKD (chronic kidney disease) stage 4, GFR 15-29 ml/min (H)  No recent labs  Creatinine   Date Value Ref Range Status   07/24/2019 2.69 (A) 0.55 - 1.02 mg/dL Final     Lewy body dementia without behavioral disturbance (H)  Bipolar affective disorder, remission status unspecified (H)  Severe episode of recurrent major depressive disorder, without psychotic features (H)  Per chart review, Remeron was decreased 3/17/21. This was likely a GDR done by psychiatry, but the note is not available yet. Patient chronically waxes and wanes with her behavior. She will stay in bed for several days and then spontaneously decide to get up. Sometimes she will keep her eyes closed and not respond in any way. Today she says she feels very sick, but does not need anything and appreciates provider stopping by    Gastroesophageal reflux disease without esophagitis  No reports of dyspepsia per staff      ALLERGIES:No known allergies  PAST MEDICAL HISTORY:   has a past medical history of Anemia, Anxiety, Bipolar affective (H), Dementia (H), Depressive disorder, Gastro-oesophageal reflux disease, OA (osteoarthritis) of knee, and Renal insufficiencies, chronic.  PAST SURGICAL HISTORY:   has a past surgical history that includes Salpingo-oophorectomy bilateral; Hysterectomy; and Eye surgery.  FAMILY HISTORY: family history includes C.A.D. in her brother, father, mother, and sister; Psychotic Disorder in her son.  SOCIAL HISTORY:  reports that she has never smoked. She has never used smokeless  "tobacco. She reports that she does not drink alcohol or use drugs.    MEDICATIONS:  Current Outpatient Medications   Medication Sig Dispense Refill     acetaminophen (TYLENOL) 500 MG tablet Take 500 mg by mouth 2 times daily and 1 tab every 4 hours as needed       donepezil (ARICEPT) 5 MG tablet Take 5 mg by mouth 2 times daily  30 tablet 0     ferrous sulfate (IRON) 325 (65 Fe) MG tablet Take 325 mg by mouth daily every other day       lamoTRIgine (LAMICTAL) 200 MG tablet Take 1 tablet (200 mg) by mouth every morning       mineral oil OIL Place 2 drops into both ears daily       mirtazapine (REMERON) 15 MG tablet Take 1.5 tablets (22.5 mg) by mouth At Bedtime       Multiple Vitamins-Minerals (PRESERVISION AREDS 2 PO) Take 1 tablet by mouth daily       ondansetron (ZOFRAN) 4 MG tablet Take 4 mg by mouth 4 times daily        pantoprazole (PROTONIX) 40 MG EC tablet Take 40 mg by mouth daily        QUEtiapine (SEROQUEL) 50 MG tablet Take 150 mg by mouth At Bedtime        senna-docusate (SENNA S) 8.6-50 MG per tablet Take 2 tablets by mouth At Bedtime       sertraline (ZOLOFT) 50 MG tablet Take 200 mg by mouth daily          Case Management:  I have reviewed the care plan and MDS and do agree with the plan. Patient's desire to return to the community is not assessible due to cognitive impairment. Information reviewed:  Medications, vital signs, orders, and nursing notes.    ROS:  Unobtainable secondary to cognitive impairment/lack of participation    Vitals:  /68   Pulse 68   Temp 98  F (36.7  C)   Resp 16   Ht 1.575 m (5' 2\")   Wt 62.6 kg (138 lb)   SpO2 93%   BMI 25.24 kg/m    Body mass index is 25.24 kg/m .  Exam:  GENERAL APPEARANCE:  Alert, in no distress  ENT:  Mouth and posterior oropharynx normal, moist mucous membranes  EYES:  EOM normal, conjunctiva and lids normal  RESP:  no respiratory distress  PSYCH:  insight and judgement impaired, memory impaired     Lab/Diagnostic data:   None " "recent    ASSESSMENT/PLAN  (N18.4) CKD (chronic kidney disease) stage 4, GFR 15-29 ml/min (H)  (primary encounter diagnosis)  Comment: No recent labs due to refusal. CKD4 based on previous labs.   Plan: Avoid nephrotoxic medications and adjust medications per renal function. Monitor renal function prn.     (G31.83,  F02.80) Lewy body dementia without behavioral disturbance (H)  (F31.9) Bipolar affective disorder, remission status unspecified (H)  (F33.2) Severe episode of recurrent major depressive disorder, without psychotic features (H)  Comment: Chronic behavioral issues of staying in bed, feeling \"sick\", and not responding. No acute concerns. Unless she exhibits any significant change, will let psychiatry adjust medications  Plan:  Continue current POC with no changes at this time and adjustments as needed.    (K21.9) Gastroesophageal reflux disease without esophagitis  Comment: No obvious symptoms  Plan: Continue current POC with no changes at this time and adjustments as needed.      Electronically signed by:  ASCENCION Pope [facility]  Phone: [phone]          "

## 2021-04-16 RX ORDER — MIRTAZAPINE 15 MG/1
22.5 TABLET, FILM COATED ORAL AT BEDTIME
Start: 2021-04-16 | End: 2021-08-13

## 2021-04-30 ENCOUNTER — CLINICAL UPDATE (OUTPATIENT)
Dept: PHARMACY | Facility: CLINIC | Age: 86
End: 2021-04-30
Payer: COMMERCIAL

## 2021-04-30 DIAGNOSIS — G31.83 LEWY BODY DEMENTIA WITHOUT BEHAVIORAL DISTURBANCE (H): ICD-10-CM

## 2021-04-30 DIAGNOSIS — F02.80 LEWY BODY DEMENTIA WITHOUT BEHAVIORAL DISTURBANCE (H): ICD-10-CM

## 2021-04-30 DIAGNOSIS — K21.9 GASTROESOPHAGEAL REFLUX DISEASE WITHOUT ESOPHAGITIS: ICD-10-CM

## 2021-04-30 DIAGNOSIS — F33.2 SEVERE EPISODE OF RECURRENT MAJOR DEPRESSIVE DISORDER, WITHOUT PSYCHOTIC FEATURES (H): ICD-10-CM

## 2021-04-30 DIAGNOSIS — N18.4 CKD (CHRONIC KIDNEY DISEASE) STAGE 4, GFR 15-29 ML/MIN (H): Primary | ICD-10-CM

## 2021-04-30 DIAGNOSIS — R11.15 NON-INTRACTABLE CYCLICAL VOMITING WITHOUT NAUSEA: ICD-10-CM

## 2021-04-30 DIAGNOSIS — D53.9 DEFICIENCY ANEMIA: ICD-10-CM

## 2021-04-30 DIAGNOSIS — F41.1 ANXIETY STATE: ICD-10-CM

## 2021-04-30 DIAGNOSIS — F31.9 BIPOLAR AFFECTIVE DISORDER, REMISSION STATUS UNSPECIFIED (H): ICD-10-CM

## 2021-04-30 PROCEDURE — 99207 PR NO CHARGE LOS: CPT | Performed by: PHARMACIST

## 2021-04-30 NOTE — PROGRESS NOTES
This patient's medication list and chart were reviewed as part of the service provided by [facility].    Assessment/Recommendations:    Pt continues on iron supplement every other day, however I don't see a recent CBC or ferritin available.  Last CBC in 8/2019 indicated MCV >100, and therefore likely iron replete.  Iron labs at that time also within range.    May consider d'c of iron supplement and follow-up ferritin and CBC in 3 months (or consider recheck CBC and ferritin first).    Noted psychiatry is managing meds.  Please consider reaching out to psychiatry and asking if ok to begin taper and d'c of Donepezil.   Pt with h/o syncopal episode, which Donepezil may contribute to.  Donepezil may also contribute to the nightmares patient has experienced, as well as pt's urinary incontinence and frequency, consistent reports of gi upset or not feeling well.  Also increases risk of gastric acid secretion, bleeding, and pt with h/o anemia as well.  Please consider reduction to 5mg once daily and monitor cognition, function.  After one month, if pt has tolerated potential reduction, consider d'c altogether.    Jade Wade, Pharm.D.,[facility]  Board Certified Geriatric Pharmacist  Medication Therapy Management Pharmacist  [phone]

## 2021-05-06 ENCOUNTER — NURSING HOME VISIT (OUTPATIENT)
Dept: GERIATRICS | Facility: CLINIC | Age: 86
End: 2021-05-06
Payer: COMMERCIAL

## 2021-05-06 VITALS
RESPIRATION RATE: 16 BRPM | BODY MASS INDEX: 25.4 KG/M2 | HEART RATE: 77 BPM | DIASTOLIC BLOOD PRESSURE: 76 MMHG | SYSTOLIC BLOOD PRESSURE: 128 MMHG | OXYGEN SATURATION: 96 % | WEIGHT: 138 LBS | TEMPERATURE: 96.8 F | HEIGHT: 62 IN

## 2021-05-06 DIAGNOSIS — D50.9 IRON DEFICIENCY ANEMIA, UNSPECIFIED IRON DEFICIENCY ANEMIA TYPE: Primary | ICD-10-CM

## 2021-05-06 DIAGNOSIS — F02.80 LEWY BODY DEMENTIA WITHOUT BEHAVIORAL DISTURBANCE (H): ICD-10-CM

## 2021-05-06 DIAGNOSIS — F31.9 BIPOLAR AFFECTIVE DISORDER, REMISSION STATUS UNSPECIFIED (H): ICD-10-CM

## 2021-05-06 DIAGNOSIS — G31.83 LEWY BODY DEMENTIA WITHOUT BEHAVIORAL DISTURBANCE (H): ICD-10-CM

## 2021-05-06 PROCEDURE — 99308 SBSQ NF CARE LOW MDM 20: CPT | Performed by: NURSE PRACTITIONER

## 2021-05-06 ASSESSMENT — MIFFLIN-ST. JEOR: SCORE: 1004.21

## 2021-05-06 NOTE — PROGRESS NOTES
[place] [facility]  [place] Medical Record Number:  [id number]  Place of Service where encounter took place:  [facility] () [[id number]]  Chief Complaint   Patient presents with     RECHECK       HPI:    Maribel Sevilla  is a 89 year old (12/20/1931), who is being seen today for an episodic care visit.  HPI information obtained from: facility chart records, facility staff and patient report.     Today's concern is:  Iron deficiency anemia, unspecified iron deficiency anemia type  Pharmacist recommendation noted that patient is on iron with no recent CBC or ferritin. When asked today, patient says she would be ok with a blood draw.     Lewy body dementia without behavioral disturbance (H)  Bipolar affective disorder, remission status unspecified (H)  Pharmacist also suggested possible taper of donezepil, but also noted that psychiatry is following. Per chart review today, noted that psychiatry has weaned down on sertraline from 200mg to 100mg, most recent change 4/21/21      Past Medical and Surgical History reviewed in Epic today.    MEDICATIONS:    Current Outpatient Medications   Medication Sig Dispense Refill     acetaminophen (TYLENOL) 500 MG tablet Take 500 mg by mouth 2 times daily and 1 tab every 4 hours as needed       donepezil (ARICEPT) 5 MG tablet Take 5 mg by mouth 2 times daily  30 tablet 0     ferrous sulfate (IRON) 325 (65 Fe) MG tablet Take 325 mg by mouth daily every other day       lamoTRIgine (LAMICTAL) 200 MG tablet Take 1 tablet (200 mg) by mouth every morning       mineral oil OIL Place 2 drops into both ears daily       mirtazapine (REMERON) 15 MG tablet Take 1.5 tablets (22.5 mg) by mouth At Bedtime       Multiple Vitamins-Minerals (PRESERVISION AREDS 2 PO) Take 1 tablet by mouth daily       ondansetron (ZOFRAN) 4 MG tablet Take 4 mg by mouth 4 times daily        pantoprazole (PROTONIX) 40 MG EC tablet Take 40 mg by mouth daily        QUEtiapine (SEROQUEL) 50 MG tablet Take  "150 mg by mouth At Bedtime        senna-docusate (SENNA S) 8.6-50 MG per tablet Take 2 tablets by mouth At Bedtime       sertraline (ZOLOFT) 100 MG tablet Take 1 tablet (100 mg) by mouth daily         REVIEW OF SYSTEMS:  4 point ROS including Respiratory, CV, GI and , other than that noted in the HPI,  is negative    Objective:  /76   Pulse 77   Temp 96.8  F (36  C)   Resp 16   Ht 1.575 m (5' 2\")   Wt 62.6 kg (138 lb)   SpO2 96%   BMI 25.24 kg/m    Exam:  GENERAL APPEARANCE:  Alert, in no distress    Labs:   Recent labs in EPIC reviewed by me today.     ASSESSMENT/PLAN:  (D50.9) Iron deficiency anemia, unspecified iron deficiency anemia type  (primary encounter diagnosis)  Comment: Patient agrees to labs, so will order.   Plan: CBC, ferritin 5/7. Adjust medication as clinically indicated.    (G31.83,  F02.80) Lewy body dementia without behavioral disturbance (H)  (F31.9) Bipolar affective disorder, remission status unspecified (H)  Comment: Chronic behavioral issues of staying in bed, feeling \"sick\", and not responding. Given recent taper of sertraline, will not yet discuss decrease of donezepil. It is safest to taper only one medication at a time in order to determine effectiveness  Plan: Continue current POC with no changes at this time and adjustments as needed.      Electronically signed by:  ASCENCION Pope [facility]  Phone: [phone]            "

## 2021-05-06 NOTE — LETTER
5/6/2021        RE: Maribel Sevilla  [facility] [place]  [address] [facility] [address]        [place] [facility]  [place] Medical Record Number:  [id number]  Place of Service where encounter took place:  [facility] () [[id number]]  Chief Complaint   Patient presents with     RECHECK       HPI:    Maribel Sevilla  is a 89 year old (12/20/1931), who is being seen today for an episodic care visit.  HPI information obtained from: facility chart records, facility staff and patient report.     Today's concern is:  Iron deficiency anemia, unspecified iron deficiency anemia type  Pharmacist recommendation noted that patient is on iron with no recent CBC or ferritin. When asked today, patient says she would be ok with a blood draw.     Lewy body dementia without behavioral disturbance (H)  Bipolar affective disorder, remission status unspecified (H)  Pharmacist also suggested possible taper of donezepil, but also noted that psychiatry is following. Per chart review today, noted that psychiatry has weaned down on sertraline from 200mg to 100mg, most recent change 4/21/21      Past Medical and Surgical History reviewed in Epic today.    MEDICATIONS:    Current Outpatient Medications   Medication Sig Dispense Refill     acetaminophen (TYLENOL) 500 MG tablet Take 500 mg by mouth 2 times daily and 1 tab every 4 hours as needed       donepezil (ARICEPT) 5 MG tablet Take 5 mg by mouth 2 times daily  30 tablet 0     ferrous sulfate (IRON) 325 (65 Fe) MG tablet Take 325 mg by mouth daily every other day       lamoTRIgine (LAMICTAL) 200 MG tablet Take 1 tablet (200 mg) by mouth every morning       mineral oil OIL Place 2 drops into both ears daily       mirtazapine (REMERON) 15 MG tablet Take 1.5 tablets (22.5 mg) by mouth At Bedtime       Multiple Vitamins-Minerals (PRESERVISION AREDS 2 PO) Take 1 tablet by mouth daily       ondansetron (ZOFRAN) 4 MG tablet Take 4 mg by mouth 4 times daily   "      pantoprazole (PROTONIX) 40 MG EC tablet Take 40 mg by mouth daily        QUEtiapine (SEROQUEL) 50 MG tablet Take 150 mg by mouth At Bedtime        senna-docusate (SENNA S) 8.6-50 MG per tablet Take 2 tablets by mouth At Bedtime       sertraline (ZOLOFT) 100 MG tablet Take 1 tablet (100 mg) by mouth daily         REVIEW OF SYSTEMS:  4 point ROS including Respiratory, CV, GI and , other than that noted in the HPI,  is negative    Objective:  /76   Pulse 77   Temp 96.8  F (36  C)   Resp 16   Ht 1.575 m (5' 2\")   Wt 62.6 kg (138 lb)   SpO2 96%   BMI 25.24 kg/m    Exam:  GENERAL APPEARANCE:  Alert, in no distress    Labs:   Recent labs in [facility] reviewed by me today.     ASSESSMENT/PLAN:  (D50.9) Iron deficiency anemia, unspecified iron deficiency anemia type  (primary encounter diagnosis)  Comment: Patient agrees to labs, so will order.   Plan: CBC, ferritin 5/7. Adjust medication as clinically indicated.    (G31.83,  F02.80) Lewy body dementia without behavioral disturbance (H)  (F31.9) Bipolar affective disorder, remission status unspecified (H)  Comment: Chronic behavioral issues of staying in bed, feeling \"sick\", and not responding. Given recent taper of sertraline, will not yet discuss decrease of donezepil. It is safest to taper only one medication at a time in order to determine effectiveness  Plan: Continue current POC with no changes at this time and adjustments as needed.      Electronically signed by:  ASCENCION Pope [facility]  Phone: [phone]                      "

## 2021-05-07 RX ORDER — SERTRALINE HYDROCHLORIDE 100 MG/1
100 TABLET, FILM COATED ORAL DAILY
Start: 2021-04-21

## 2021-05-18 ENCOUNTER — NURSING HOME VISIT (OUTPATIENT)
Dept: GERIATRICS | Facility: CLINIC | Age: 86
End: 2021-05-18
Payer: COMMERCIAL

## 2021-05-18 VITALS
TEMPERATURE: 98.3 F | HEART RATE: 75 BPM | RESPIRATION RATE: 16 BRPM | WEIGHT: 138 LBS | BODY MASS INDEX: 25.4 KG/M2 | OXYGEN SATURATION: 93 % | HEIGHT: 62 IN

## 2021-05-18 DIAGNOSIS — D50.9 IRON DEFICIENCY ANEMIA, UNSPECIFIED IRON DEFICIENCY ANEMIA TYPE: Primary | ICD-10-CM

## 2021-05-18 PROCEDURE — 99308 SBSQ NF CARE LOW MDM 20: CPT | Performed by: NURSE PRACTITIONER

## 2021-05-18 RX ORDER — FERROUS SULFATE 325(65) MG
325 TABLET ORAL
Start: 2021-05-18 | End: 2024-07-02

## 2021-05-18 ASSESSMENT — MIFFLIN-ST. JEOR: SCORE: 1004.21

## 2021-05-18 NOTE — PROGRESS NOTES
[place] [facility]  [place] Medical Record Number:  [id number]  Place of Service where encounter took place:  [facility] () [[id number]]  Chief Complaint   Patient presents with     RECHECK       HPI:    Maribel Sevilla  is a 89 year old (12/20/1931), who is being seen today for an episodic care visit.  HPI information obtained from: facility chart records, facility staff and patient report.     Today's concern is:  Iron deficiency anemia, unspecified iron deficiency anemia type  Hgb 9.0. She is on iron every other day. She often c/o feeling fatigued. She denies any side effects from iron and is agreeable to taking it daily      Past Medical and Surgical History reviewed in Epic today.    MEDICATIONS:    Current Outpatient Medications   Medication Sig Dispense Refill     acetaminophen (TYLENOL) 500 MG tablet Take 500 mg by mouth 2 times daily and 1 tab every 4 hours as needed       donepezil (ARICEPT) 5 MG tablet Take 5 mg by mouth 2 times daily  30 tablet 0     ferrous sulfate (IRON) 325 (65 Fe) MG tablet Take 325 mg by mouth daily every other day       lamoTRIgine (LAMICTAL) 200 MG tablet Take 1 tablet (200 mg) by mouth every morning       mineral oil OIL Place 2 drops into both ears daily       mirtazapine (REMERON) 15 MG tablet Take 1.5 tablets (22.5 mg) by mouth At Bedtime       Multiple Vitamins-Minerals (PRESERVISION AREDS 2 PO) Take 1 tablet by mouth daily       ondansetron (ZOFRAN) 4 MG tablet Take 4 mg by mouth 4 times daily        pantoprazole (PROTONIX) 40 MG EC tablet Take 40 mg by mouth daily        QUEtiapine (SEROQUEL) 50 MG tablet Take 150 mg by mouth At Bedtime        senna-docusate (SENNA S) 8.6-50 MG per tablet Take 2 tablets by mouth At Bedtime       sertraline (ZOLOFT) 100 MG tablet Take 1 tablet (100 mg) by mouth daily           REVIEW OF SYSTEMS:  Limited secondary to cognitive impairment but today pt reports 4 point ROS including Respiratory, CV, GI and , other than that  "noted in the HPI,  is negative    Objective:  Pulse 75   Temp 98.3  F (36.8  C)   Resp 16   Ht 1.575 m (5' 2\")   Wt 62.6 kg (138 lb)   SpO2 93%   BMI 25.24 kg/m    Exam:  GENERAL APPEARANCE:  Alert, in no distress    Labs:   Recent labs in [facility] reviewed by me today.     ASSESSMENT/PLAN:  (D50.9) Iron deficiency anemia, unspecified iron deficiency anemia type  (primary encounter diagnosis)  Comment: Hgb of 9 could be causing fatigue. As long as she tolerates the iron supplement, it would be beneficial to increase it  Plan: Increase iron to daily. Monitor for nausea or constipation      Electronically signed by:  ASCENCION Pope [facility]  Phone: [phone]        .  "

## 2021-05-18 NOTE — LETTER
5/18/2021        RE: Maribel Sevilla  [facility] [place]  [address] [facility] [address]        [place] [facility]  [place] Medical Record Number:  [id number]  Place of Service where encounter took place:  [facility] () [[id number]]  Chief Complaint   Patient presents with     RECHECK       HPI:    Maribel Sevilla  is a 89 year old (12/20/1931), who is being seen today for an episodic care visit.  HPI information obtained from: facility chart records, facility staff and patient report.     Today's concern is:  Iron deficiency anemia, unspecified iron deficiency anemia type  Hgb 9.0. She is on iron every other day. She often c/o feeling fatigued. She denies any side effects from iron and is agreeable to taking it daily      Past Medical and Surgical History reviewed in Epic today.    MEDICATIONS:    Current Outpatient Medications   Medication Sig Dispense Refill     acetaminophen (TYLENOL) 500 MG tablet Take 500 mg by mouth 2 times daily and 1 tab every 4 hours as needed       donepezil (ARICEPT) 5 MG tablet Take 5 mg by mouth 2 times daily  30 tablet 0     ferrous sulfate (IRON) 325 (65 Fe) MG tablet Take 325 mg by mouth daily every other day       lamoTRIgine (LAMICTAL) 200 MG tablet Take 1 tablet (200 mg) by mouth every morning       mineral oil OIL Place 2 drops into both ears daily       mirtazapine (REMERON) 15 MG tablet Take 1.5 tablets (22.5 mg) by mouth At Bedtime       Multiple Vitamins-Minerals (PRESERVISION AREDS 2 PO) Take 1 tablet by mouth daily       ondansetron (ZOFRAN) 4 MG tablet Take 4 mg by mouth 4 times daily        pantoprazole (PROTONIX) 40 MG EC tablet Take 40 mg by mouth daily        QUEtiapine (SEROQUEL) 50 MG tablet Take 150 mg by mouth At Bedtime        senna-docusate (SENNA S) 8.6-50 MG per tablet Take 2 tablets by mouth At Bedtime       sertraline (ZOLOFT) 100 MG tablet Take 1 tablet (100 mg) by mouth daily           REVIEW OF SYSTEMS:  Limited  "secondary to cognitive impairment but today pt reports 4 point ROS including Respiratory, CV, GI and , other than that noted in the HPI,  is negative    Objective:  Pulse 75   Temp 98.3  F (36.8  C)   Resp 16   Ht 1.575 m (5' 2\")   Wt 62.6 kg (138 lb)   SpO2 93%   BMI 25.24 kg/m    Exam:  GENERAL APPEARANCE:  Alert, in no distress    Labs:   Recent labs in [facility] reviewed by me today.     ASSESSMENT/PLAN:  (D50.9) Iron deficiency anemia, unspecified iron deficiency anemia type  (primary encounter diagnosis)  Comment: Hgb of 9 could be causing fatigue. As long as she tolerates the iron supplement, it would be beneficial to increase it  Plan: Increase iron to daily. Monitor for nausea or constipation      Electronically signed by:  ASCENCION Pope [facility]  Phone: [phone]        .            "

## 2021-06-10 ENCOUNTER — NURSING HOME VISIT (OUTPATIENT)
Dept: GERIATRICS | Facility: CLINIC | Age: 86
End: 2021-06-10
Payer: COMMERCIAL

## 2021-06-10 DIAGNOSIS — F02.80 LEWY BODY DEMENTIA WITHOUT BEHAVIORAL DISTURBANCE (H): Primary | ICD-10-CM

## 2021-06-10 DIAGNOSIS — D50.9 IRON DEFICIENCY ANEMIA, UNSPECIFIED IRON DEFICIENCY ANEMIA TYPE: ICD-10-CM

## 2021-06-10 DIAGNOSIS — F31.9 BIPOLAR AFFECTIVE DISORDER, REMISSION STATUS UNSPECIFIED (H): ICD-10-CM

## 2021-06-10 DIAGNOSIS — G31.83 LEWY BODY DEMENTIA WITHOUT BEHAVIORAL DISTURBANCE (H): Primary | ICD-10-CM

## 2021-06-12 NOTE — PROGRESS NOTES
Pt was seen for a regulatory LTC visit.    Pt reports feeling well, has no physical complaints  She denies cough, chest pain, dizziness, abd pain    She remains on po Fe for the treatment of anemia    Exam  Very pleasant, well appearing, sitting in chair in room  Oriented to person, place and general circumstances  Poor dentition with several missing teeth  Lungs clear  CV rrr  abd non-tender  No tremors    Assessment    Lewy Body body dementia, stable    Bipolar disorder, stable on lamictal, seroquel, remeron, sertraline    Anemia, Hgb 9, history of Fe deficiency    Poor dentition.  Pt is currently seeing dentist, with more tooth extractions planned    Plan  Psychiatry f/u  Monitor Hgb, further evaluation if no improvement in Hgb with Fe supplementation

## 2021-08-04 NOTE — LETTER
7/26/2018        RE: Maribel Sevilla  [facility]  [address] [facility] [address]        [facility]  Chief Complaint   Patient presents with     Annual Comprehensive Nursing Home       [place] Medical Record Number:  [id number]    HPI:    Maribel Sevilla is a 86 year old  (12/20/1931), who is being seen today for an annual comprehensive visit at [facility].  HPI information obtained from: facility chart records, facility staff and patient report.  Today's concerns are:  Lewy body dementia without behavioral disturbance  Chronic; progressive; continues to require 24-hr supportive cares  Resident resides on LTC unit  Is appropriate with conversation and does most ADLs independently with some prompting/direction  Continues on regimen of Aricept  BIMs: 15/15    Bilateral impacted cerumen  Hx of recurrence of impacted cerumen. Started on regimen of mineral oil gtts to ears at bedtime as she allows to prevent buildup. No recent concerns/complaint of decreased hearing.    Gastroesophageal reflux disease without esophagitis  Non-intractable cyclical vomiting without nausea  Chronic  Resident has periods of GI complaints that include GERD and vomiting up of sputum; does have PRN Zofran available.     Constipation, slow transit  Functional diarrhea  Varying degrees  No bowel complaints today  Continues on regimen of Senna-S    Primary osteoarthritis of both knees  Chronic  Occasional noted pains - denies pain today  Controlled on regimen of Tylenol    CKD (chronic kidney disease) stage 4, GFR 15-29 ml/min (H)  Ongoing   Ref. Range 1/23/2017 00:00 2/6/2017 00:00 8/2/2017 00:00   Urea Nitrogen Latest Ref Range: 9 - 26 mg/dL 22 31 (A) 30 (H)   Creatinine Latest Ref Range: 0.55 - 1.02 mg/dL 2.25 (H) 2.77 (A) 2.80 (H)   GFR Estimate Latest Ref Range: >60 ml/min/1.73m2 19 (L) 15 (A) 15 (L)   GFR Estimate If Black Latest Ref Range: >60 ml/min/1.73m2 22 (L) 17 (A) 17 (L)  "    Urinary urgency  Chronic  Resident notes that she does have issues making it to the BR occasionally 2/2 urgency. Wears briefs daily.    Deficiency anemia   Ref. Range 8/5/2016 00:00 1/18/2017 00:00 8/2/2017 00:00   Hemoglobin Latest Ref Range: 11.8 - 15.5 g/dL 12.1 9.8 (A) 8.2 (L)   No replacement regimen at this time    Severe episode of recurrent major depressive disorder, without psychotic features (H)  Bipolar disorder, current episode depressed, severe, without psychotic features (H)  Managed by Psych; has varying days - some days she is up out of bed on the units participating in activities, some she is up reading in her room, and others she prefers to spend in bed in her pajamas.   Managed on regimen of Lamictal, Remeron, Seroquel and Zoloft    CARDIOVASCULAR SCREENING; LDL GOAL LESS THAN 160  Annual screening    Anxiety state  No recent episodes on anxiety  BPD tx regimen as noted above    Psychophysiological insomnia  2/2 above noted dx  Remeron as noted above  She denies issues with sleeping at night    Weight loss  Wt Readings from Last 9 Encounters:   07/25/18 123 lb 11.2 oz (56.1 kg)   06/28/18 126 lb 6.4 oz (57.3 kg)   02/25/18 125 lb (56.7 kg)   12/14/17 123 lb 14.4 oz (56.2 kg)   10/01/17 130 lb (59 kg)   09/26/17 125 lb (56.7 kg)   08/03/17 130 lb (59 kg)   08/01/17 129 lb (58.5 kg)   06/07/17 132 lb (59.9 kg)   Gradual weight loss over the past year  Estimated body mass index is 22.63 kg/(m^2) as calculated from the following:    Height as of this encounter: 5' 2\" (1.575 m).    Weight as of this encounter: 123 lb 11.2 oz (56.1 kg).  BMI WNL  In-house dietician monitoring       Based on JNC-8 goals,  patients age of 86 year old, no presence of diabetes or CKD, and goals of care goal BP is  <140/90 mm Hg. Patient is stable and continue without pharmacological invention with routine assessment..    ALLERGIES: No known allergies  PROBLEM LIST:  Patient Active Problem List   Diagnosis     Lewy body " dementia     Major depressive disorder, recurrent episode (H)     Bipolar disorder (H)     CARDIOVASCULAR SCREENING; LDL GOAL LESS THAN 160     Advanced directives, counseling/discussion     Anxiety state     Abnormality of gait     Deficiency anemia     Esophageal reflux     Insomnia     Constipation, slow transit     OA (osteoarthritis) of knee     Diarrhea     Bilateral impacted cerumen     Non-intractable cyclical vomiting without nausea     Weight loss     CKD (chronic kidney disease) stage 4, GFR 15-29 ml/min (H)     Urinary urgency     Dental caries     PAST MEDICAL HISTORY:  has a past medical history of Anemia; Anxiety; Bipolar affective (H); Dementia; Depressive disorder; Gastro-oesophageal reflux disease; OA (osteoarthritis) of knee; and Renal insufficiencies, chronic.  PAST SURGICAL HISTORY:  has a past surgical history that includes Salpingo-oophorectomy bilateral; Hysterectomy; and Eye surgery.  FAMILY HISTORY: family history includes C.A.D. in her brother, father, mother, and sister; Psychotic Disorder in her son.  SOCIAL HISTORY:  reports that she has never smoked. She has never used smokeless tobacco. She reports that she does not drink alcohol or use illicit drugs.  IMMUNIZATIONS:  Most Recent Immunizations   Administered Date(s) Administered     Influenza (IIV3) PF 10/02/2017     Mantoux Tuberculin Skin Test 08/20/2011     Pneumo Conj 13-V (2010&after) 10/05/2015     Pneumococcal 23 valent 10/23/2013     TD (ADULT, 7+) 03/15/2017     Above immunizations pulled from [facility]. [facility] and facility records also reconciled. Outstanding information sent to  to update [facility].  Future immunizations needed:  yearly influenza per facility protocol  MEDICATIONS:  Current Outpatient Prescriptions   Medication Sig Dispense Refill     acetaminophen (TYLENOL) 500 MG tablet Take 500 mg by mouth 2 times daily and 1 tab every 4 hours as needed       donepezil (ARICEPT) 5 MG tablet Take 5  mg by mouth 2 times daily  30 tablet 0     lamoTRIgine (LAMICTAL) 150 MG tablet Take 200 mg by mouth At Bedtime        melatonin 5 MG tablet Take 5 mg by mouth At Bedtime       mirtazapine (REMERON) 15 MG tablet Take 30 mg by mouth At Bedtime        Multiple Vitamins-Minerals (OCUVITE ADULT FORMULA) CAPS Take 1 tablet by mouth daily        ondansetron (ZOFRAN) 4 MG tablet Take 4 mg by mouth 4 times daily        QUEtiapine (SEROQUEL) 50 MG tablet Take 150 mg by mouth At Bedtime        senna-docusate (SENNA S) 8.6-50 MG per tablet Take 2 tablets by mouth At Bedtime       sertraline (ZOLOFT) 50 MG tablet Take 150 mg by mouth daily        ferrous sulfate (IRON) 325 (65 Fe) MG tablet Take 325 mg by mouth daily every other day       pantoprazole (PROTONIX) 40 MG EC tablet Take 40 mg by mouth 2 times daily       polyethylene glycol 3350 POWD Take 17 g by mouth daily as needed       Medications reviewed:  Medications reconciled to facility chart and changes were made to reflect current medications as identified as above med list. Below are the changes that were made:   Medications stopped since last EPIC medication reconciliation:   There are no discontinued medications.    Medications started since last [facility] medication reconciliation:  No orders of the defined types were placed in this encounter.    Case Management:  I have reviewed the facility/SNF care plan/MDS which was done 5/2/18, including the falls risk, nutrition and pain screening. I also reviewed the current immunizations, and preventive care..Future cancer screening is not clinically indicated secondary to age/goals of care Patient's desire to return to the community is present, but is not able due to care needs . Current Level of Care is appropriate.    Advance Directive Discussion:    I reviewed the current advanced directives as reflected in EPIC, the POLST and the facility chart, and verified the congruency of orders. I contacted the first party, daughter,  "Mari and discussed the plan of Care.  I did review the advance directives with the resident.     Team Discussion:  I communicated with the appropriate disciplines involved with the Plan of Care:   Nursing      Patient Goal:  Patient's goal is to help her feel more happy and get essential oils for her diffuser.    Information reviewed:  Medications, vital signs, orders, and nursing notes.    ROS:  10 point ROS of systems including Constitutional, Eyes, Respiratory, Cardiovascular, Gastroenterology, Genitourinary, Integumentary, Muscularskeletal, Psychiatric were all negative except for pertinent positives noted in my HPI.    Exam:  /72  Pulse 78  Temp 98.1  F (36.7  C)  Resp 18  Ht 5' 2\" (1.575 m)  Wt 123 lb 11.2 oz (56.1 kg)  SpO2 92%  BMI 22.63 kg/m2  GENERAL APPEARANCE:  Alert, in no distress, oriented, cooperative elderly woman resting in bed during the middle of the day in her [facility]  ENT:  Mouth and posterior oropharynx normal, moist mucous membranes, [facility]  EYES:  EOM, conjunctivae, lids, pupils and irises normal  NECK:  No adenopathy, masses or thyromegaly  RESP:  Respiratory effort and palpation of chest normal, lungs clear to auscultation, no respiratory distress  CV:  Palpation and auscultation of heart done, regular rate and rhythm, no murmur, rub, or gallop, no edema, +2 pedal pulses  ABDOMEN: Active bowel sounds x4, soft, nontender, no hepatosplenomegaly or other masses  M/S:   Gait and station normal; Digits and nails abnormal - arthritic changes present  NEURO:   Cranial nerves 2-12 are normal tested and grossly at patient's baseline  PSYCH:  oriented X 3, normal insight, judgement and memory, affect and mood Pleasant; tired.    BP Readings from Last 3 Encounters:   07/31/18 122/66   07/25/18 121/72   06/28/18 104/67     Pulse Readings from Last 4 Encounters:   07/31/18 68   07/25/18 78   06/28/18 74   04/18/18 75     Lab/Diagnostic data:   CBC RESULTS:   Recent Labs   Lab Test 08/02/17 " 01/18/17   09/02/15   1933   07/19/12   1225   WBC  4.9  5.1   < >  7.7   < >  5.6   RBC  2.87*  3.17*   < >  3.67*   < >  3.91   HGB  8.2*  9.8*   < >  11.5*   < >  12.2   HCT  26.8*  31.0*   < >  35.3   < >  36.6   MCV  93.4  97.8   < >  96   < >  94   MCH   --    --    --   31.3   --   31.2   MCHC   --    --    --   32.6   --   33.3   RDW  13.9  12.9   < >  12.7   < >  12.2   PLT  226  215   < >  249   < >  173    < > = values in this interval not displayed.       Last Basic Metabolic Panel:  Recent Labs   Lab Test 08/02/17 02/06/17   NA  142  144   POTASSIUM  4.0  4.4   CHLORIDE  110*  109   RILEY  9.5  9.7   CO2  25  25   BUN  30*  31*   CR  2.80*  2.77*   GLC  138*  111*       Liver Function Studies -   Recent Labs   Lab Test 08/02/17 09/08/11   0705   PROTTOTAL  6.2*  6.2*   ALBUMIN  3.0*  3.5   BILITOTAL  0.2  0.5   ALKPHOS  81  86   AST  27  33   ALT  13  25       TSH   Date Value Ref Range Status   09/09/2011 2.14 0.4 - 5.0 mU/L Final         ASSESSMENT/PLAN  Lewy body dementia without behavioral disturbance  Stable on current regimen; continue medications as currently ordered.  Continue with current POC as it remains appropriate for the resident    Bilateral impacted cerumen  Stable on current regimen; continue medications as currently ordered.    Gastroesophageal reflux disease without esophagitis  Non-intractable cyclical vomiting without nausea  Stable on current regimen; continue medications as currently ordered.  Spoke with staff about attaining peppermint essential oils for the resident's diffuser to be used on days when she has c/o stomach upset    Constipation, slow transit  Functional diarrhea  Stable on current regimen; continue medications as currently ordered.    Primary osteoarthritis of both knees  Stable on current regimen; continue medications as currently ordered.    CKD (chronic kidney disease) stage 4, GFR 15-29 ml/min (H)  BMP    Urinary urgency  Continue to offer/remind resident to use  BR q2h throughout waking hours    Deficiency anemia  CBC    Severe episode of recurrent major depressive disorder, without psychotic features (H)  Bipolar disorder, current episode depressed, severe, without psychotic features (H)  Continue management per in-house psych; appreciate their care of Maribel  Stable on current regimen; continue medications as currently ordered.  LFTs and Lamictal level    CARDIOVASCULAR SCREENING; LDL GOAL LESS THAN 160  Fasting lipid panel    Anxiety state  No recent concerns  Continue BPD tx regimen as ordered    Psychophysiological insomnia  Stable on current regimen; continue medications as currently ordered.    Weight loss  Has been remaining relatively stable  Continue current interventions per dietary recommendations    Electronically signed by:  ASCENCION Rm CNP      Sincerely,        ASCENCION Rm CNP     Continue Regimen: Clindamycin 1% topical swab QD Discontinue Regimen: Doxycycline Detail Level: Zone Initiate Treatment: Benzoyl peroxide 5% wash 1-2 times a week Modify Regimen: Increase using Tretinoin 0.025%  to QHS using moisturizer Render In Strict Bullet Format?: No

## 2021-08-12 ENCOUNTER — NURSING HOME VISIT (OUTPATIENT)
Dept: GERIATRICS | Facility: CLINIC | Age: 86
End: 2021-08-12
Payer: COMMERCIAL

## 2021-08-12 VITALS
DIASTOLIC BLOOD PRESSURE: 70 MMHG | RESPIRATION RATE: 16 BRPM | BODY MASS INDEX: 25.4 KG/M2 | HEIGHT: 62 IN | TEMPERATURE: 98.5 F | OXYGEN SATURATION: 93 % | WEIGHT: 138 LBS | HEART RATE: 88 BPM | SYSTOLIC BLOOD PRESSURE: 120 MMHG

## 2021-08-12 DIAGNOSIS — N18.4 CKD (CHRONIC KIDNEY DISEASE) STAGE 4, GFR 15-29 ML/MIN (H): Primary | ICD-10-CM

## 2021-08-12 DIAGNOSIS — D50.9 IRON DEFICIENCY ANEMIA, UNSPECIFIED IRON DEFICIENCY ANEMIA TYPE: ICD-10-CM

## 2021-08-12 DIAGNOSIS — F02.80 LEWY BODY DEMENTIA WITHOUT BEHAVIORAL DISTURBANCE (H): ICD-10-CM

## 2021-08-12 DIAGNOSIS — K21.9 GASTROESOPHAGEAL REFLUX DISEASE WITHOUT ESOPHAGITIS: ICD-10-CM

## 2021-08-12 DIAGNOSIS — G31.83 LEWY BODY DEMENTIA WITHOUT BEHAVIORAL DISTURBANCE (H): ICD-10-CM

## 2021-08-12 DIAGNOSIS — F31.9 BIPOLAR AFFECTIVE DISORDER, REMISSION STATUS UNSPECIFIED (H): ICD-10-CM

## 2021-08-12 PROCEDURE — 99309 SBSQ NF CARE MODERATE MDM 30: CPT | Performed by: NURSE PRACTITIONER

## 2021-08-12 ASSESSMENT — MIFFLIN-ST. JEOR: SCORE: 1004.21

## 2021-08-12 NOTE — PROGRESS NOTES
[facility]  Chief Complaint   Patient presents with     [facility]     [place] Medical Record Number:  [id number]  Place of Service where encounter took place:  [facility] () [[id number]]    HPI:    Maribel Sevilla  is 89 year old (12/20/1931), who is being seen today for a federally mandated E/M visit.     Today's concerns are:  CKD (chronic kidney disease) stage 4, GFR 15-29 ml/min (H)  Creatinine   Date Value Ref Range Status   07/24/2019 2.69 (A) 0.55 - 1.02 mg/dL Final     Gastroesophageal reflux disease without esophagitis  Denies dyspepsia. She has a history of cyclical vomiting, but this has not occurred in some time    Lewy body dementia without behavioral disturbance (H)  Bipolar affective disorder, remission status unspecified (H)  Patient goes through episodes of refusing to get out of bed, refusing cares, keeping eyes closed. At last several visits she has been alert, in her chair, talkative. Today she is again, says she feels good. Thanks provider for stopping by.     Iron deficiency anemia, unspecified iron deficiency anemia type  Iron increased to daily in May due to Hgb 9.0    ALLERGIES:No known allergies  PAST MEDICAL HISTORY:   Past Medical History:   Diagnosis Date     Anemia      Anxiety      Bipolar affective (H)      Dementia (H)     LewyBody Dementia Sept 2011     Depressive disorder      Gastro-oesophageal reflux disease      OA (osteoarthritis) of knee      Renal insufficiencies, chronic      PAST SURGICAL HISTORY:   has a past surgical history that includes Salpingo-oophorectomy bilateral; Hysterectomy; and Eye surgery.  FAMILY HISTORY: family history includes C.A.D. in her brother, father, mother, and sister; Psychotic Disorder in her son.  SOCIAL HISTORY:  reports that she has never smoked. She has never used smokeless tobacco. She reports that she does not drink alcohol and does not use drugs.    MEDICATIONS:  Current Outpatient Medications  "  Medication Sig Dispense Refill     acetaminophen (TYLENOL) 500 MG tablet Take 500 mg by mouth 2 times daily and 1 tab every 4 hours as needed       donepezil (ARICEPT) 5 MG tablet Take 5 mg by mouth 2 times daily  30 tablet 0     ferrous sulfate (FEROSUL) 325 (65 Fe) MG tablet Take 1 tablet (325 mg) by mouth daily (with breakfast)       lamoTRIgine (LAMICTAL) 200 MG tablet Take 1 tablet (200 mg) by mouth every morning       mineral oil OIL Place 2 drops into both ears daily       mirtazapine (REMERON) 15 MG tablet Take 1 tablet (15 mg) by mouth At Bedtime       Multiple Vitamins-Minerals (PRESERVISION AREDS 2 PO) Take 1 tablet by mouth daily       ondansetron (ZOFRAN) 4 MG tablet Take 4 mg by mouth 4 times daily        pantoprazole (PROTONIX) 40 MG EC tablet Take 40 mg by mouth daily        QUEtiapine (SEROQUEL) 50 MG tablet Take 150 mg by mouth At Bedtime        senna-docusate (SENNA S) 8.6-50 MG per tablet Take 2 tablets by mouth At Bedtime       sertraline (ZOLOFT) 100 MG tablet Take 1 tablet (100 mg) by mouth daily         Case Management:  I have reviewed the care plan and MDS and do agree with the plan. Patient's desire to return to the community is not present. Information reviewed:  Medications, vital signs, orders, and nursing notes.    ROS:  10 point ROS of systems including Constitutional, Eyes, Respiratory, Cardiovascular, Gastroenterology, Genitourinary, Integumentary, Musculoskeletal, Psychiatric were all negative except for pertinent positives noted in my HPI.    Vitals:  /70   Pulse 88   Temp 98.5  F (36.9  C)   Resp 16   Ht 1.575 m (5' 2\")   Wt 62.6 kg (138 lb)   SpO2 93%   BMI 25.24 kg/m    Body mass index is 25.24 kg/m .  Exam:  GENERAL APPEARANCE:  Alert, in no distress  ENT:  Mouth and posterior oropharynx normal, moist mucous membranes, normal hearing acuity  EYES:  EOM, conjunctivae, lids, pupils and irises normal  RESP:  respiratory effort and palpation of chest normal, lungs " clear to auscultation , no respiratory distress  CV:  Palpation and auscultation of heart done , regular rate and rhythm, no murmur, rub, or gallop, no edema  ABDOMEN:  normal bowel sounds, soft, nontender, no hepatosplenomegaly or other masses  SKIN:  Inspection of skin and subcutaneous tissue baseline, Palpation of skin and subcutaneous tissue baseline  PSYCH:  insight and judgement impaired, memory impaired , affect and mood normal    Lab/Diagnostic data:   Recent labs in [facility] reviewed by me today.     ASSESSMENT/PLAN  (N18.4) CKD (chronic kidney disease) stage 4, GFR 15-29 ml/min (H)  (primary encounter diagnosis)  Comment: No recent labs. She says she is agreeable to lab draws, so will order for next week  Plan: Avoid nephrotoxic medications and adjust medications per renal function. Monitor renal function every 6 months.     (K21.9) Gastroesophageal reflux disease without esophagitis  Comment: Chronic condition being managed with medications and is currently asymptomatic.  Plan: Continue current POC with no changes at this time and adjustments as needed.    (G31.83,  F02.80) Lewy body dementia without behavioral disturbance (H)  Comment: Chronic, progressive. Requires 24 hour skilled nursing care. HPI/ROS difficult to obtain with cognitive impairment. Expect further functional and cognitive decline. Expect weight loss.  Plan: Continue 24 hour care. Monitor weight. Monitor functional status. Monitor for behavioral disturbances.    (F31.9) Bipolar affective disorder, remission status unspecified (H)  Comment: Chronic condition being managed with medications, frequent assessments and continues to have expected and unavoidable exacerbations with current therapy.  Plan: Continue current POC with no changes at this time and adjustments as needed.    (D50.9) Iron deficiency anemia, unspecified iron deficiency anemia type  Comment: Tolerating iron, will recheck Hgb  Plan: Hgb next week. Adjust medication as clinically  indicated.      Electronically signed by:  ASCENCION Pope CNP   [facility]  Phone: [phone]

## 2021-08-12 NOTE — LETTER
8/12/2021        RE: Maribel Sevilla  [facility]  [address] [facility] [address]        [facility] GERIATRIC SERVICES  Chief Complaint   Patient presents with     [facility]     [place] Medical Record Number:  [id number]  Place of Service where encounter took place:  [facility] () [[id number]]    HPI:    Maribel Sevilla  is 89 year old (12/20/1931), who is being seen today for a federally mandated E/M visit.     Today's concerns are:  CKD (chronic kidney disease) stage 4, GFR 15-29 ml/min (H)  Creatinine   Date Value Ref Range Status   07/24/2019 2.69 (A) 0.55 - 1.02 mg/dL Final     Gastroesophageal reflux disease without esophagitis  Denies dyspepsia. She has a history of cyclical vomiting, but this has not occurred in some time    Lewy body dementia without behavioral disturbance (H)  Bipolar affective disorder, remission status unspecified (H)  Patient goes through episodes of refusing to get out of bed, refusing cares, keeping eyes closed. At last several visits she has been alert, in her chair, talkative. Today she is again, says she feels good. Thanks provider for stopping by.     Iron deficiency anemia, unspecified iron deficiency anemia type  Iron increased to daily in May due to Hgb 9.0    ALLERGIES:No known allergies  PAST MEDICAL HISTORY:   Past Medical History:   Diagnosis Date     Anemia      Anxiety      Bipolar affective (H)      Dementia (H)     LewyBody Dementia Sept 2011     Depressive disorder      Gastro-oesophageal reflux disease      OA (osteoarthritis) of knee      Renal insufficiencies, chronic      PAST SURGICAL HISTORY:   has a past surgical history that includes Salpingo-oophorectomy bilateral; Hysterectomy; and Eye surgery.  FAMILY HISTORY: family history includes C.A.D. in her brother, father, mother, and sister; Psychotic Disorder in her son.  SOCIAL HISTORY:  reports that she has never smoked. She has never used smokeless tobacco.  "She reports that she does not drink alcohol and does not use drugs.    MEDICATIONS:  Current Outpatient Medications   Medication Sig Dispense Refill     acetaminophen (TYLENOL) 500 MG tablet Take 500 mg by mouth 2 times daily and 1 tab every 4 hours as needed       donepezil (ARICEPT) 5 MG tablet Take 5 mg by mouth 2 times daily  30 tablet 0     ferrous sulfate (FEROSUL) 325 (65 Fe) MG tablet Take 1 tablet (325 mg) by mouth daily (with breakfast)       lamoTRIgine (LAMICTAL) 200 MG tablet Take 1 tablet (200 mg) by mouth every morning       mineral oil OIL Place 2 drops into both ears daily       mirtazapine (REMERON) 15 MG tablet Take 1 tablet (15 mg) by mouth At Bedtime       Multiple Vitamins-Minerals (PRESERVISION AREDS 2 PO) Take 1 tablet by mouth daily       ondansetron (ZOFRAN) 4 MG tablet Take 4 mg by mouth 4 times daily        pantoprazole (PROTONIX) 40 MG EC tablet Take 40 mg by mouth daily        QUEtiapine (SEROQUEL) 50 MG tablet Take 150 mg by mouth At Bedtime        senna-docusate (SENNA S) 8.6-50 MG per tablet Take 2 tablets by mouth At Bedtime       sertraline (ZOLOFT) 100 MG tablet Take 1 tablet (100 mg) by mouth daily         Case Management:  I have reviewed the care plan and MDS and do agree with the plan. Patient's desire to return to the community is not present. Information reviewed:  Medications, vital signs, orders, and nursing notes.    ROS:  10 point ROS of systems including Constitutional, Eyes, Respiratory, Cardiovascular, Gastroenterology, Genitourinary, Integumentary, Musculoskeletal, Psychiatric were all negative except for pertinent positives noted in my HPI.    Vitals:  /70   Pulse 88   Temp 98.5  F (36.9  C)   Resp 16   Ht 1.575 m (5' 2\")   Wt 62.6 kg (138 lb)   SpO2 93%   BMI 25.24 kg/m    Body mass index is 25.24 kg/m .  Exam:  GENERAL APPEARANCE:  Alert, in no distress  ENT:  Mouth and posterior oropharynx normal, moist mucous membranes, normal hearing acuity  EYES:  " EOM, conjunctivae, lids, pupils and irises normal  RESP:  respiratory effort and palpation of chest normal, lungs clear to auscultation , no respiratory distress  CV:  Palpation and auscultation of heart done , regular rate and rhythm, no murmur, rub, or gallop, no edema  ABDOMEN:  normal bowel sounds, soft, nontender, no hepatosplenomegaly or other masses  SKIN:  Inspection of skin and subcutaneous tissue baseline, Palpation of skin and subcutaneous tissue baseline  PSYCH:  insight and judgement impaired, memory impaired , affect and mood normal    Lab/Diagnostic data:   Recent labs in [facility] reviewed by me today.     ASSESSMENT/PLAN  (N18.4) CKD (chronic kidney disease) stage 4, GFR 15-29 ml/min (H)  (primary encounter diagnosis)  Comment: No recent labs. She says she is agreeable to lab draws, so will order for next week  Plan: Avoid nephrotoxic medications and adjust medications per renal function. Monitor renal function every 6 months.     (K21.9) Gastroesophageal reflux disease without esophagitis  Comment: Chronic condition being managed with medications and is currently asymptomatic.  Plan: Continue current POC with no changes at this time and adjustments as needed.    (G31.83,  F02.80) Lewy body dementia without behavioral disturbance (H)  Comment: Chronic, progressive. Requires 24 hour skilled nursing care. HPI/ROS difficult to obtain with cognitive impairment. Expect further functional and cognitive decline. Expect weight loss.  Plan: Continue 24 hour care. Monitor weight. Monitor functional status. Monitor for behavioral disturbances.    (F31.9) Bipolar affective disorder, remission status unspecified (H)  Comment: Chronic condition being managed with medications, frequent assessments and continues to have expected and unavoidable exacerbations with current therapy.  Plan: Continue current POC with no changes at this time and adjustments as needed.    (D50.9) Iron deficiency anemia, unspecified iron  deficiency anemia type  Comment: Tolerating iron, will recheck Hgb  Plan: Hgb next week. Adjust medication as clinically indicated.      Electronically signed by:  ASCENCION Pope [facility]  Phone: [phone]

## 2021-08-13 RX ORDER — MIRTAZAPINE 15 MG/1
15 TABLET, FILM COATED ORAL AT BEDTIME
Start: 2021-08-13 | End: 2024-05-12

## 2021-08-23 ENCOUNTER — TELEPHONE (OUTPATIENT)
Dept: GERIATRICS | Facility: CLINIC | Age: 86
End: 2021-08-23

## 2021-11-01 ENCOUNTER — NURSING HOME VISIT (OUTPATIENT)
Dept: GERIATRICS | Facility: CLINIC | Age: 86
End: 2021-11-01
Payer: COMMERCIAL

## 2021-11-01 DIAGNOSIS — R11.15 NON-INTRACTABLE CYCLICAL VOMITING WITHOUT NAUSEA: ICD-10-CM

## 2021-11-01 DIAGNOSIS — G31.83 LEWY BODY DEMENTIA WITHOUT BEHAVIORAL DISTURBANCE (H): ICD-10-CM

## 2021-11-01 DIAGNOSIS — F02.80 LEWY BODY DEMENTIA WITHOUT BEHAVIORAL DISTURBANCE (H): ICD-10-CM

## 2021-11-01 DIAGNOSIS — F31.9 BIPOLAR AFFECTIVE DISORDER, REMISSION STATUS UNSPECIFIED (H): ICD-10-CM

## 2021-11-01 DIAGNOSIS — K21.9 GASTROESOPHAGEAL REFLUX DISEASE WITHOUT ESOPHAGITIS: Primary | ICD-10-CM

## 2021-11-02 NOTE — PROGRESS NOTES
Pt was seen for a regulatory LTC visit    Case reviewed with nursing staff    Pt c/o abd pain, nausea this am  No emesis  States her symptoms started this am    Staff notes Pt frequently has episodes of nausea, emesis, during which time she refuses to get out of bed. She then generally rapidly improves, leaves her room and is interactive    Exam  Lying in bed, eyes close, stating she feels sick  Oral mucosa moist  Lungs clear  CV rrr  Abd soft, non-distended, mild epigastric tenderness      Assessment    C/o nausea, abd pain, in Pt with history of GERD, episodes as above.  Abd exam benign.  Unclear if symptoms are secondary to GERD, ? Gastroparesis, ? Anxiety    Lewy Body Dementia, Bipolar disorder, on lamictal,seroquel, sertraline    History of Fe deficiency anemia, on Fe    Plan  Monitor GI status  Continue PPI  Monitor Hgb

## 2021-11-04 ENCOUNTER — NURSING HOME VISIT (OUTPATIENT)
Dept: GERIATRICS | Facility: CLINIC | Age: 86
End: 2021-11-04
Payer: COMMERCIAL

## 2021-11-04 VITALS
DIASTOLIC BLOOD PRESSURE: 75 MMHG | WEIGHT: 138 LBS | SYSTOLIC BLOOD PRESSURE: 120 MMHG | RESPIRATION RATE: 17 BRPM | TEMPERATURE: 98.1 F | BODY MASS INDEX: 25.4 KG/M2 | HEART RATE: 75 BPM | HEIGHT: 62 IN | OXYGEN SATURATION: 93 %

## 2021-11-04 DIAGNOSIS — G31.83 LEWY BODY DEMENTIA WITHOUT BEHAVIORAL DISTURBANCE (H): ICD-10-CM

## 2021-11-04 DIAGNOSIS — F31.9 BIPOLAR AFFECTIVE DISORDER, REMISSION STATUS UNSPECIFIED (H): ICD-10-CM

## 2021-11-04 DIAGNOSIS — F02.80 LEWY BODY DEMENTIA WITHOUT BEHAVIORAL DISTURBANCE (H): ICD-10-CM

## 2021-11-04 DIAGNOSIS — N18.4 CKD (CHRONIC KIDNEY DISEASE) STAGE 4, GFR 15-29 ML/MIN (H): Primary | ICD-10-CM

## 2021-11-04 DIAGNOSIS — D50.9 IRON DEFICIENCY ANEMIA, UNSPECIFIED IRON DEFICIENCY ANEMIA TYPE: ICD-10-CM

## 2021-11-04 PROCEDURE — 99318 PR ANNUAL NURSING FAC ASSESSMNT, STABLE: CPT | Performed by: NURSE PRACTITIONER

## 2021-11-04 ASSESSMENT — MIFFLIN-ST. JEOR: SCORE: 1004.21

## 2021-11-04 NOTE — LETTER
11/4/2021        RE: Maribel Sevilla  [facility]  [address] [facility] [address] [place] [facility] GERIATRIC SERVICES  Chief Complaint   Patient presents with     Annual Comprehensive Nursing Home     [place] Medical Record Number:  [id number]  Place of Service where encounter took place:  [facility] () [[id number]]    HPI:    Maribel Sevilla  is a 89 year old  (12/20/1931), who is being seen today for an annual comprehensive visit. HPI information obtained from: facility chart records and facility staff.     CKD (chronic kidney disease) stage 4, GFR 15-29 ml/min (H)  Patient refuses most lab draws at facility, but when she went to the ED for a finger laceration in October, they did get a BMP. Estimated GFR 13-15    Lewy body dementia without behavioral disturbance (H)  Bipolar affective disorder, remission status unspecified (H)  Patient goes through episodes of refusing to get out of bed, refusing cares, keeping eyes closed. Today she opens her eyes once when provider enters room and introduces self, but then keeps them closed and does not answer any questions.     Iron deficiency anemia, unspecified iron deficiency anemia type  In May iron was increased from every other day to daily due to c/o fatigue and Hgb 9  Hgb 9.0 5/7/21  Hgb 9.9 10/2/21      ALLERGIES: No known allergies  PAST MEDICAL HISTORY:   Past Medical History:   Diagnosis Date     Anemia      Anxiety      Bipolar affective (H)      Dementia (H)     LewyBody Dementia Sept 2011     Depressive disorder      Gastro-oesophageal reflux disease      OA (osteoarthritis) of knee      Renal insufficiencies, chronic       PAST SURGICAL HISTORY:  has a past surgical history that includes Salpingo-oophorectomy bilateral; Hysterectomy; and Eye surgery.  IMMUNIZATIONS:  Immunization History   Administered Date(s) Administered     COVID-19,PF,Moderna 12/30/2020, 01/27/2021     FLUAD -HD 65+ QUAD ([facility] ONLY) 10/08/2021      Influenza (IIV3) PF 10/30/2014, 10/05/2015, 10/18/2016, 10/02/2017, 09/28/2018, 10/03/2019     Influenza, Quad, High Dose, Pf, 65yr+ (Fluzone HD) 10/16/2020     Mantoux Tuberculin Skin Test 08/20/2011     Pneumo Conj 13-V (2010&after) 10/05/2015     Pneumococcal 23 valent 08/20/2011, 10/23/2013     TD (ADULT, 7+) 10/29/2011, 10/30/2011, 03/15/2017     Above immunizations pulled from [facility]. [facility] and facility records also reconciled. Future immunizations are not needed at this point as all recommended immunizations are up to date.       Current Outpatient Medications:      ACE/ARB/ARNI NOT PRESCRIBED (INTENTIONAL), Please choose reason not prescribed from choices below., Disp: , Rfl:      acetaminophen (TYLENOL) 500 MG tablet, Take 500 mg by mouth 2 times daily and 1 tab every 4 hours as needed, Disp: , Rfl:      donepezil (ARICEPT) 5 MG tablet, Take 5 mg by mouth 2 times daily , Disp: 30 tablet, Rfl: 0     ferrous sulfate (FEROSUL) 325 (65 Fe) MG tablet, Take 1 tablet (325 mg) by mouth daily (with breakfast), Disp: , Rfl:      lamoTRIgine (LAMICTAL) 200 MG tablet, Take 1 tablet (200 mg) by mouth every morning, Disp: , Rfl:      mirtazapine (REMERON) 15 MG tablet, Take 1 tablet (15 mg) by mouth At Bedtime, Disp: , Rfl:      Multiple Vitamins-Minerals (PRESERVISION AREDS 2 PO), Take 1 tablet by mouth daily, Disp: , Rfl:      ondansetron (ZOFRAN) 4 MG tablet, Take 4 mg by mouth 4 times daily , Disp: , Rfl:      pantoprazole (PROTONIX) 40 MG EC tablet, Take 40 mg by mouth daily , Disp: , Rfl:      QUEtiapine (SEROQUEL) 50 MG tablet, Take 200 mg by mouth At Bedtime , Disp: , Rfl:      senna-docusate (SENNA S) 8.6-50 MG per tablet, Take 2 tablets by mouth At Bedtime, Disp: , Rfl:      sertraline (ZOLOFT) 100 MG tablet, Take 1 tablet (100 mg) by mouth daily, Disp: , Rfl:      Case Management:  I have reviewed the facility/SNF care plan/MDS, including the falls risk, nutrition and pain screening. I also reviewed  "the current immunizations, and preventive care.. Future cancer screening is not clinically indicated secondary to age/goals of care Patient's desire to return to the community is not present. Current Level of Care is appropriate.    Advance Directive Discussion:    I reviewed the current advanced directives as reflected in EPIC, the POLST and the facility chart, and verified the congruency of orders. I contacted the first party and left a message regarding the plan of Care.  I did not due to cognitive impairment review the advance directives with the resident. Patient's goal is unobtainable secondary to lack of cooperation.    Team Discussion:  I communicated with the appropriate disciplines involved with the Plan of Care:   Nursing    Information reviewed:  Medications, vital signs, orders, and nursing notes.    ROS:  Unobtainable secondary to lack of cooperation    Vitals:  /75   Pulse 75   Temp 98.1  F (36.7  C)   Resp 17   Ht 1.575 m (5' 2\")   Wt 62.6 kg (138 lb)   SpO2 93%   BMI 25.24 kg/m   Body mass index is 25.24 kg/m .  Exam:  GENERAL APPEARANCE:  Eyes closed, but is not sleeping, no apparent distress  ENT:  Mouth and posterior oropharynx normal, moist mucous membranes, normal hearing acuity  EYES:  nothing abnormal observed during brief opening of eyes, lids normal, no discharge  RESP:  respiratory effort and palpation of chest normal, lungs clear to auscultation , no respiratory distress  CV:  Palpation and auscultation of heart done , regular rate and rhythm, no murmur, rub, or gallop, no edema  ABDOMEN:  normal bowel sounds, soft, nontender, no hepatosplenomegaly or other masses  M/S:   Digits and nails normal  LUND  SKIN:  Inspection of skin and subcutaneous tissue baseline, Palpation of skin and subcutaneous tissue baseline  PSYCH:  affect and mood flat, does not respond to examiner today, calm     Lab/Diagnostic data:   Recent labs in [facility] reviewed by me today. "     ASSESSMENT/PLAN  (N18.4) CKD (chronic kidney disease) stage 4, GFR 15-29 ml/min (H)  (primary encounter diagnosis)  Comment: Severe CKD, cause is not clear. She is difficult to evaluate due to her fluctuating participation. Staff are not aware of any issues with voiding, no s/sx retention. There is no sign of any reversible cause.  Plan: Avoid nephrotoxic medications and adjust medications per renal function. Monitor renal function every 3 months if patient allows.    (G31.83,  F02.80) Lewy body dementia without behavioral disturbance (H)  (F31.9) Bipolar affective disorder, remission status unspecified (H)  Comment: Chronic, progressive. Requires 24 hour skilled nursing care. HPI/ROS difficult to obtain with lack of cooperation today. Expect further functional and cognitive decline. Expect weight loss.  Plan: Continue 24 hour care. Monitor weight. Monitor functional status. Monitor for behavioral disturbances.    (D50.9) Iron deficiency anemia, unspecified iron deficiency anemia type  Comment: Hgb improved with daily iron with no obvious s/sx side effects  Plan: Continue current POC with no changes at this time and adjustments as needed.      Electronically signed by:  ASCENCION Pope [facility]  Phone: [phone]

## 2021-11-04 NOTE — PROGRESS NOTES
[facility] GERIATRIC SERVICES  Chief Complaint   Patient presents with     Annual Comprehensive Nursing Home     [place] Medical Record Number:  [id number]  Place of Service where encounter took place:  [facility] () [[id number]]    HPI:    Maribel Sevilla  is a 89 year old  (12/20/1931), who is being seen today for an annual comprehensive visit. HPI information obtained from: facility chart records and facility staff.     CKD (chronic kidney disease) stage 4, GFR 15-29 ml/min (H)  Patient refuses most lab draws at facility, but when she went to the ED for a finger laceration in October, they did get a BMP. Estimated GFR 13-15    Lewy body dementia without behavioral disturbance (H)  Bipolar affective disorder, remission status unspecified (H)  Patient goes through episodes of refusing to get out of bed, refusing cares, keeping eyes closed. Today she opens her eyes once when provider enters room and introduces self, but then keeps them closed and does not answer any questions.     Iron deficiency anemia, unspecified iron deficiency anemia type  In May iron was increased from every other day to daily due to c/o fatigue and Hgb 9  Hgb 9.0 5/7/21  Hgb 9.9 10/2/21      ALLERGIES: No known allergies  PAST MEDICAL HISTORY:   Past Medical History:   Diagnosis Date     Anemia      Anxiety      Bipolar affective (H)      Dementia (H)     LewyBody Dementia Sept 2011     Depressive disorder      Gastro-oesophageal reflux disease      OA (osteoarthritis) of knee      Renal insufficiencies, chronic       PAST SURGICAL HISTORY:  has a past surgical history that includes Salpingo-oophorectomy bilateral; Hysterectomy; and Eye surgery.  IMMUNIZATIONS:  Immunization History   Administered Date(s) Administered     COVID-19,PF,Moderna 12/30/2020, 01/27/2021     FLUAD -HD 65+ QUAD ([facility] ONLY) 10/08/2021     Influenza (IIV3) PF 10/30/2014, 10/05/2015, 10/18/2016, 10/02/2017, 09/28/2018, 10/03/2019     Influenza, Quad, High  Dose, Pf, 65yr+ (Fluzone HD) 10/16/2020     Mantoux Tuberculin Skin Test 08/20/2011     Pneumo Conj 13-V (2010&after) 10/05/2015     Pneumococcal 23 valent 08/20/2011, 10/23/2013     TD (ADULT, 7+) 10/29/2011, 10/30/2011, 03/15/2017     Above immunizations pulled from [facility]. MIIC and facility records also reconciled. Future immunizations are not needed at this point as all recommended immunizations are up to date.       Current Outpatient Medications:      ACE/ARB/ARNI NOT PRESCRIBED (INTENTIONAL), Please choose reason not prescribed from choices below., Disp: , Rfl:      acetaminophen (TYLENOL) 500 MG tablet, Take 500 mg by mouth 2 times daily and 1 tab every 4 hours as needed, Disp: , Rfl:      donepezil (ARICEPT) 5 MG tablet, Take 5 mg by mouth 2 times daily , Disp: 30 tablet, Rfl: 0     ferrous sulfate (FEROSUL) 325 (65 Fe) MG tablet, Take 1 tablet (325 mg) by mouth daily (with breakfast), Disp: , Rfl:      lamoTRIgine (LAMICTAL) 200 MG tablet, Take 1 tablet (200 mg) by mouth every morning, Disp: , Rfl:      mirtazapine (REMERON) 15 MG tablet, Take 1 tablet (15 mg) by mouth At Bedtime, Disp: , Rfl:      Multiple Vitamins-Minerals (PRESERVISION AREDS 2 PO), Take 1 tablet by mouth daily, Disp: , Rfl:      ondansetron (ZOFRAN) 4 MG tablet, Take 4 mg by mouth 4 times daily , Disp: , Rfl:      pantoprazole (PROTONIX) 40 MG EC tablet, Take 40 mg by mouth daily , Disp: , Rfl:      QUEtiapine (SEROQUEL) 50 MG tablet, Take 200 mg by mouth At Bedtime , Disp: , Rfl:      senna-docusate (SENNA S) 8.6-50 MG per tablet, Take 2 tablets by mouth At Bedtime, Disp: , Rfl:      sertraline (ZOLOFT) 100 MG tablet, Take 1 tablet (100 mg) by mouth daily, Disp: , Rfl:      Case Management:  I have reviewed the facility/SNF care plan/MDS, including the falls risk, nutrition and pain screening. I also reviewed the current immunizations, and preventive care.. Future cancer screening is not clinically indicated secondary to  "age/goals of care Patient's desire to return to the community is not present. Current Level of Care is appropriate.    Advance Directive Discussion:    I reviewed the current advanced directives as reflected in EPIC, the POLST and the facility chart, and verified the congruency of orders. I contacted the first party and left a message regarding the plan of Care.  I did not due to cognitive impairment review the advance directives with the resident. Patient's goal is unobtainable secondary to lack of cooperation.    Team Discussion:  I communicated with the appropriate disciplines involved with the Plan of Care:   Nursing    Information reviewed:  Medications, vital signs, orders, and nursing notes.    ROS:  Unobtainable secondary to lack of cooperation    Vitals:  /75   Pulse 75   Temp 98.1  F (36.7  C)   Resp 17   Ht 1.575 m (5' 2\")   Wt 62.6 kg (138 lb)   SpO2 93%   BMI 25.24 kg/m   Body mass index is 25.24 kg/m .  Exam:  GENERAL APPEARANCE:  Eyes closed, but is not sleeping, no apparent distress  ENT:  Mouth and posterior oropharynx normal, moist mucous membranes, normal hearing acuity  EYES:  nothing abnormal observed during brief opening of eyes, lids normal, no discharge  RESP:  respiratory effort and palpation of chest normal, lungs clear to auscultation , no respiratory distress  CV:  Palpation and auscultation of heart done , regular rate and rhythm, no murmur, rub, or gallop, no edema  ABDOMEN:  normal bowel sounds, soft, nontender, no hepatosplenomegaly or other masses  M/S:   Digits and nails normal  LUND  SKIN:  Inspection of skin and subcutaneous tissue baseline, Palpation of skin and subcutaneous tissue baseline  PSYCH:  affect and mood flat, does not respond to examiner today, calm     Lab/Diagnostic data:   Recent labs in [facility] reviewed by me today.     ASSESSMENT/PLAN  (N18.4) CKD (chronic kidney disease) stage 4, GFR 15-29 ml/min (H)  (primary encounter diagnosis)  Comment: Severe CKD, " cause is not clear. She is difficult to evaluate due to her fluctuating participation. Staff are not aware of any issues with voiding, no s/sx retention. There is no sign of any reversible cause.  Plan: Avoid nephrotoxic medications and adjust medications per renal function. Monitor renal function every 3 months if patient allows.    (G31.83,  F02.80) Lewy body dementia without behavioral disturbance (H)  (F31.9) Bipolar affective disorder, remission status unspecified (H)  Comment: Chronic, progressive. Requires 24 hour skilled nursing care. HPI/ROS difficult to obtain with lack of cooperation today. Expect further functional and cognitive decline. Expect weight loss.  Plan: Continue 24 hour care. Monitor weight. Monitor functional status. Monitor for behavioral disturbances.    (D50.9) Iron deficiency anemia, unspecified iron deficiency anemia type  Comment: Hgb improved with daily iron with no obvious s/sx side effects  Plan: Continue current POC with no changes at this time and adjustments as needed.      Electronically signed by:  ASCENCION Pope [facility]  Phone: [phone]

## 2022-01-06 ENCOUNTER — NURSING HOME VISIT (OUTPATIENT)
Dept: GERIATRICS | Facility: CLINIC | Age: 87
End: 2022-01-06
Payer: COMMERCIAL

## 2022-01-06 VITALS
HEIGHT: 62 IN | SYSTOLIC BLOOD PRESSURE: 122 MMHG | OXYGEN SATURATION: 93 % | DIASTOLIC BLOOD PRESSURE: 76 MMHG | BODY MASS INDEX: 25.4 KG/M2 | HEART RATE: 60 BPM | WEIGHT: 138 LBS | RESPIRATION RATE: 17 BRPM | TEMPERATURE: 97.5 F

## 2022-01-06 DIAGNOSIS — D50.9 IRON DEFICIENCY ANEMIA, UNSPECIFIED IRON DEFICIENCY ANEMIA TYPE: ICD-10-CM

## 2022-01-06 DIAGNOSIS — G31.83 LEWY BODY DEMENTIA WITH BEHAVIORAL DISTURBANCE (H): ICD-10-CM

## 2022-01-06 DIAGNOSIS — F02.818 LEWY BODY DEMENTIA WITH BEHAVIORAL DISTURBANCE (H): ICD-10-CM

## 2022-01-06 DIAGNOSIS — N18.4 CKD (CHRONIC KIDNEY DISEASE) STAGE 4, GFR 15-29 ML/MIN (H): Primary | ICD-10-CM

## 2022-01-06 DIAGNOSIS — F31.9 BIPOLAR AFFECTIVE DISORDER, REMISSION STATUS UNSPECIFIED (H): ICD-10-CM

## 2022-01-06 PROCEDURE — 99309 SBSQ NF CARE MODERATE MDM 30: CPT | Performed by: NURSE PRACTITIONER

## 2022-01-06 ASSESSMENT — MIFFLIN-ST. JEOR: SCORE: 999.21

## 2022-01-06 NOTE — PROGRESS NOTES
[facility] GERIATRIC SERVICES  Chief Complaint   Patient presents with     alf Regulatory     [place] [facility] Record Number:  [id number]  Place of Service where encounter took place:  [facility] () [[id number]]    HPI:    Maribel Sevilla  is 89 year old (12/20/1931), who is being seen today for a federally mandated E/M visit.     Today's concerns are:  CKD (chronic kidney disease) stage 4, GFR 15-29 ml/min (H)  Patient has refused last 2 ordered lab draws.     Iron deficiency anemia, unspecified iron deficiency anemia type  On iron, again, refused lab draws. No melena or bloody stools.     Bipolar affective disorder, remission status unspecified (H)  Lewy body dementia with behavioral disturbance (H)  Patient cycles through periods of refusing to speak and refusing to get out of bed. She was snoring soundly upon examiner's arrival. When spoken to, she did stop snoring and seemed to wake up, but would not open her eyes or answer questions.      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  FAMILY HISTORY: family history includes C.A.D. in her brother, father, mother, and sister; Psychotic Disorder in her son.  SOCIAL HISTORY:  reports that she has never smoked. She has never used smokeless tobacco. She reports that she does not drink alcohol and does not use drugs.    MEDICATIONS:  Current Outpatient Medications   Medication Sig Dispense Refill     ACE/ARB/ARNI NOT PRESCRIBED (INTENTIONAL) Please choose reason not prescribed from choices below.       acetaminophen (TYLENOL) 500 MG tablet Take  "500 mg by mouth 2 times daily and 1 tab every 4 hours as needed       donepezil (ARICEPT) 5 MG tablet Take 5 mg by mouth 2 times daily  30 tablet 0     ferrous sulfate (FEROSUL) 325 (65 Fe) MG tablet Take 1 tablet (325 mg) by mouth daily (with breakfast)       lamoTRIgine (LAMICTAL) 200 MG tablet Take 1 tablet (200 mg) by mouth every morning       mirtazapine (REMERON) 15 MG tablet Take 1 tablet (15 mg) by mouth At Bedtime       Multiple Vitamins-Minerals (PRESERVISION AREDS 2 PO) Take 1 tablet by mouth daily       ondansetron (ZOFRAN) 4 MG tablet Take 4 mg by mouth 4 times daily        pantoprazole (PROTONIX) 40 MG EC tablet Take 40 mg by mouth daily        QUEtiapine (SEROQUEL) 50 MG tablet Take 200 mg by mouth At Bedtime        senna-docusate (SENNA S) 8.6-50 MG per tablet Take 2 tablets by mouth At Bedtime       sertraline (ZOLOFT) 100 MG tablet Take 1 tablet (100 mg) by mouth daily         Case Management:  I have reviewed the care plan and MDS and do agree with the plan. Patient's desire to return to the community is not present. Information reviewed:  Medications, vital signs, orders, and nursing notes.    ROS:  Unobtainable secondary to cognitive impairment.     Vitals:  /76   Pulse 60   Temp 97.5  F (36.4  C)   Resp 17   Ht 1.575 m (5' 2\")   Wt 62.6 kg (138 lb)   SpO2 93%   BMI 25.24 kg/m    Body mass index is 25.24 kg/m .  Exam:  GENERAL APPEARANCE:  in no distress  RESP:  no respiratory distress  CV:  no edema  SKIN:  Inspection of skin and subcutaneous tissue baseline    Lab/Diagnostic data:   none    ASSESSMENT/PLAN  (N18.4) CKD (chronic kidney disease) stage 4, GFR 15-29 ml/min (H)  (primary encounter diagnosis)  Comment: No recent assessment, but based on history, recommend dosing any medications based on CKD 4  Plan: Avoid nephrotoxic medications and adjust medications per renal function. Monitor renal function as able.    (D50.9) Iron deficiency anemia, unspecified iron deficiency " anemia type  Comment: No s/sx bleeding or symptoms of anemia. Her Hgb had dropped in the past when iron was stopped, so recommend continuing  Plan: Continue current POC with no changes at this time and adjustments as needed.    (F31.9) Bipolar affective disorder, remission status unspecified (H)  Comment: Chronic condition being managed with medications and continues to have expected and unavoidable exacerbations with current therapy.  Plan: Continue current POC with no changes at this time and adjustments as needed.    (G31.83,  F02.81) Lewy body dementia with behavioral disturbance (H)  Comment: Chronic, progressive. Requires 24 hour skilled nursing care. HPI/ROS difficult to obtain with cognitive impairment/psychiatric issues. Expect further functional and cognitive decline. Expect weight loss.  Plan: Continue 24 hour care. Monitor weight. Monitor functional status. Monitor for behavioral disturbances.      Electronically signed by:  ASCENCION Pope [facility]  Phone: [phone]

## 2022-01-06 NOTE — LETTER
1/6/2022        RE: Maribel Sevilla  [facility]  [address] [facility] [address]        [facility] GERIATRIC SERVICES  Chief Complaint   Patient presents with     [facility] Regulatory     [place] [facility] Record Number:  [id number]  Place of Service where encounter took place:  [facility] () [[id number]]    HPI:    Maribel Sevilla  is 89 year old (12/20/1931), who is being seen today for a federally mandated E/M visit.     Today's concerns are:  CKD (chronic kidney disease) stage 4, GFR 15-29 ml/min (H)  Patient has refused last 2 ordered lab draws.     Iron deficiency anemia, unspecified iron deficiency anemia type  On iron, again, refused lab draws. No melena or bloody stools.     Bipolar affective disorder, remission status unspecified (H)  Lewy body dementia with behavioral disturbance (H)  Patient cycles through periods of refusing to speak and refusing to get out of bed. She was snoring soundly upon examiner's arrival. When spoken to, she did stop snoring and seemed to wake up, but would not open her eyes or answer questions.      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  FAMILY HISTORY: family history includes C.A.D. in her brother, father, mother, and sister; Psychotic Disorder in her son.  SOCIAL HISTORY:  reports that she has never smoked. She has never used smokeless tobacco. She reports that she does not drink alcohol and does not use drugs.    MEDICATIONS:  Current Outpatient Medications   Medication Sig Dispense Refill     ACE/ARB/ARNI NOT  "PRESCRIBED (INTENTIONAL) Please choose reason not prescribed from choices below.       acetaminophen (TYLENOL) 500 MG tablet Take 500 mg by mouth 2 times daily and 1 tab every 4 hours as needed       donepezil (ARICEPT) 5 MG tablet Take 5 mg by mouth 2 times daily  30 tablet 0     ferrous sulfate (FEROSUL) 325 (65 Fe) MG tablet Take 1 tablet (325 mg) by mouth daily (with breakfast)       lamoTRIgine (LAMICTAL) 200 MG tablet Take 1 tablet (200 mg) by mouth every morning       mirtazapine (REMERON) 15 MG tablet Take 1 tablet (15 mg) by mouth At Bedtime       Multiple Vitamins-Minerals (PRESERVISION AREDS 2 PO) Take 1 tablet by mouth daily       ondansetron (ZOFRAN) 4 MG tablet Take 4 mg by mouth 4 times daily        pantoprazole (PROTONIX) 40 MG EC tablet Take 40 mg by mouth daily        QUEtiapine (SEROQUEL) 50 MG tablet Take 200 mg by mouth At Bedtime        senna-docusate (SENNA S) 8.6-50 MG per tablet Take 2 tablets by mouth At Bedtime       sertraline (ZOLOFT) 100 MG tablet Take 1 tablet (100 mg) by mouth daily         Case Management:  I have reviewed the care plan and MDS and do agree with the plan. Patient's desire to return to the community is not present. Information reviewed:  Medications, vital signs, orders, and nursing notes.    ROS:  Unobtainable secondary to cognitive impairment.     Vitals:  /76   Pulse 60   Temp 97.5  F (36.4  C)   Resp 17   Ht 1.575 m (5' 2\")   Wt 62.6 kg (138 lb)   SpO2 93%   BMI 25.24 kg/m    Body mass index is 25.24 kg/m .  Exam:  GENERAL APPEARANCE:  in no distress  RESP:  no respiratory distress  CV:  no edema  SKIN:  Inspection of skin and subcutaneous tissue baseline    Lab/Diagnostic data:   none    ASSESSMENT/PLAN  (N18.4) CKD (chronic kidney disease) stage 4, GFR 15-29 ml/min (H)  (primary encounter diagnosis)  Comment: No recent assessment, but based on history, recommend dosing any medications based on CKD 4  Plan: Avoid nephrotoxic medications and adjust " medications per renal function. Monitor renal function as able.    (D50.9) Iron deficiency anemia, unspecified iron deficiency anemia type  Comment: No s/sx bleeding or symptoms of anemia. Her Hgb had dropped in the past when iron was stopped, so recommend continuing  Plan: Continue current POC with no changes at this time and adjustments as needed.    (F31.9) Bipolar affective disorder, remission status unspecified (H)  Comment: Chronic condition being managed with medications and continues to have expected and unavoidable exacerbations with current therapy.  Plan: Continue current POC with no changes at this time and adjustments as needed.    (G31.83,  F02.81) Lewy body dementia with behavioral disturbance (H)  Comment: Chronic, progressive. Requires 24 hour skilled nursing care. HPI/ROS difficult to obtain with cognitive impairment/psychiatric issues. Expect further functional and cognitive decline. Expect weight loss.  Plan: Continue 24 hour care. Monitor weight. Monitor functional status. Monitor for behavioral disturbances.      Electronically signed by:  ASCENCION Pope [facility]  Phone: [phone]

## 2022-03-07 ENCOUNTER — NURSING HOME VISIT (OUTPATIENT)
Dept: GERIATRICS | Facility: CLINIC | Age: 87
End: 2022-03-07
Payer: MEDICAID

## 2022-03-07 DIAGNOSIS — F02.818 LEWY BODY DEMENTIA WITH BEHAVIORAL DISTURBANCE (H): Primary | ICD-10-CM

## 2022-03-07 DIAGNOSIS — N18.4 CKD (CHRONIC KIDNEY DISEASE) STAGE 4, GFR 15-29 ML/MIN (H): ICD-10-CM

## 2022-03-07 DIAGNOSIS — G31.83 LEWY BODY DEMENTIA WITH BEHAVIORAL DISTURBANCE (H): Primary | ICD-10-CM

## 2022-03-07 PROCEDURE — 99207 PR NO CHARGE LOS: CPT | Performed by: INTERNAL MEDICINE

## 2022-03-09 NOTE — PROGRESS NOTES
"Pt was seen for a regulatory LTC visit    Course reviewed in Epic      Pt states \"everything hurts,\" but does not elaborate  She denies abd pain, cough, SOB    VSS  Sleepy, lying in bed  Alerts, to name, does not answer most questions, but does repeat \"everything hurts\"  In NAD  Lungs clear  CV rrr  Abd soft,  Non-distended, non-tender    No recent labs (Pt has repeatedly refused)    Assessment      Lewy Body dementia, ongoing intermittent refusal to allow cares    History of anemia, on Fe, without recent labs    CKD stage 4, unclear status, as Pt has refused labs    C/o generalized pain, difficult to assess, in view of dementia, chronic behavioral concerns    Plan monitor status, further evaluation as clinical course dictates, and as Pt allows                      "

## 2022-04-18 ENCOUNTER — CLINICAL UPDATE (OUTPATIENT)
Dept: PHARMACY | Facility: CLINIC | Age: 87
End: 2022-04-18
Payer: MEDICAID

## 2022-04-18 DIAGNOSIS — F41.1 ANXIETY STATE: ICD-10-CM

## 2022-04-18 DIAGNOSIS — F33.2 SEVERE EPISODE OF RECURRENT MAJOR DEPRESSIVE DISORDER, WITHOUT PSYCHOTIC FEATURES (H): ICD-10-CM

## 2022-04-18 DIAGNOSIS — N18.4 CKD (CHRONIC KIDNEY DISEASE) STAGE 4, GFR 15-29 ML/MIN (H): Primary | ICD-10-CM

## 2022-04-18 DIAGNOSIS — G31.83 LEWY BODY DEMENTIA WITHOUT BEHAVIORAL DISTURBANCE (H): ICD-10-CM

## 2022-04-18 DIAGNOSIS — F02.80 LEWY BODY DEMENTIA WITHOUT BEHAVIORAL DISTURBANCE (H): ICD-10-CM

## 2022-04-18 DIAGNOSIS — F31.9 BIPOLAR AFFECTIVE DISORDER, REMISSION STATUS UNSPECIFIED (H): ICD-10-CM

## 2022-04-18 DIAGNOSIS — K21.9 GASTROESOPHAGEAL REFLUX DISEASE WITHOUT ESOPHAGITIS: ICD-10-CM

## 2022-04-18 DIAGNOSIS — D53.9 DEFICIENCY ANEMIA: ICD-10-CM

## 2022-04-18 PROCEDURE — 99207 PR NO CHARGE LOS: CPT | Performed by: PHARMACIST

## 2022-04-18 NOTE — PROGRESS NOTES
This patient's medication list and chart were reviewed as part of the service provided by [facility] and Geriatric Services.    Assessment/Recommendations:  1. (Lewy Body Dementia):  May be of benefit to consider reduction in donepezil to 5mg daily and monitor target symptoms.   Pt with h/o syncopal episode, which Donepezil may contribute to.  Donepezil may also contribute to the nightmares patient has experienced, as well as pt's urinary incontinence and frequency, consistent reports of gi upset or not feeling well.  Also increases risk of gastric acid secretion, bleeding, and pt with h/o anemia as well.  Please consider reduction to 5mg once daily and monitor cognition, function, behavioral symptoms.     Noted patient with history of refusing labs.    Jade Wade, Pharm.D.,[facility]  Board Certified Geriatric Pharmacist  Medication Therapy Management Pharmacist  [phone]

## 2022-04-27 ENCOUNTER — PATIENT OUTREACH (OUTPATIENT)
Dept: GERIATRIC MEDICINE | Facility: CLINIC | Age: 87
End: 2022-04-27
Payer: COMMERCIAL

## 2022-04-27 NOTE — PROGRESS NOTES
[facility] Care Coordination Contact    Internal CC change effective 5/1/22.  Mailed member CC Change letter.  Additional tasks to be completed by CMS include: update database & EPIC, and create member files on [employer].

## 2022-04-27 NOTE — LETTER
April 27, 2022    MIS RAI  [facility]  [address] [facility] [address]      Dear Mis:     As a member of [facility] ([facility]), you are provided a care coordinator. I will be your new care coordinator as of 5/1/22. I will be calling you soon to see how you are doing and determine your needs.    If you have any questions, please feel free to call me at [phone]. If you reach my voice mail, please leave a message and your phone number. If you are hearing impaired, please call the [place] Relay at [phone] or [phone] ([email or web address] relay service).    I look forward to speaking with you soon.    Sincerely,    [email or web address]  Phone: [phone]      [facility] is a health plan that contracts with both Medicare and the [place] Medical Assistance (Medicaid) program to provide benefits of both programs to enrollees. Enrollment in [facility] depends on contract renewal.      [facility]  [id number] DHS Approved ([id number])  [id number] (11/18)

## 2022-05-05 ENCOUNTER — NURSING HOME VISIT (OUTPATIENT)
Dept: GERIATRICS | Facility: CLINIC | Age: 87
End: 2022-05-05
Payer: COMMERCIAL

## 2022-05-05 VITALS
BODY MASS INDEX: 25.4 KG/M2 | HEART RATE: 68 BPM | OXYGEN SATURATION: 93 % | HEIGHT: 62 IN | DIASTOLIC BLOOD PRESSURE: 78 MMHG | WEIGHT: 138 LBS | SYSTOLIC BLOOD PRESSURE: 122 MMHG | TEMPERATURE: 97.3 F | RESPIRATION RATE: 18 BRPM

## 2022-05-05 DIAGNOSIS — F02.80 LEWY BODY DEMENTIA WITHOUT BEHAVIORAL DISTURBANCE (H): ICD-10-CM

## 2022-05-05 DIAGNOSIS — D50.9 IRON DEFICIENCY ANEMIA, UNSPECIFIED IRON DEFICIENCY ANEMIA TYPE: ICD-10-CM

## 2022-05-05 DIAGNOSIS — F31.9 BIPOLAR AFFECTIVE DISORDER, REMISSION STATUS UNSPECIFIED (H): ICD-10-CM

## 2022-05-05 DIAGNOSIS — N18.4 CKD (CHRONIC KIDNEY DISEASE) STAGE 4, GFR 15-29 ML/MIN (H): Primary | ICD-10-CM

## 2022-05-05 DIAGNOSIS — G31.83 LEWY BODY DEMENTIA WITHOUT BEHAVIORAL DISTURBANCE (H): ICD-10-CM

## 2022-05-05 PROCEDURE — 99309 SBSQ NF CARE MODERATE MDM 30: CPT | Performed by: NURSE PRACTITIONER

## 2022-05-05 NOTE — PROGRESS NOTES
[facility] GERIATRICS  Chief Complaint   Patient presents with     [facility] care Regulatory     [place] Medical Record Number:  [id number]  Place of Service where encounter took place:  [facility] () [[id number]]    HPI:    Maribel Sevilla  is 89 year old (12/20/1931), who is being seen today for a federally mandated E/M visit.     Today's concerns are:  CKD (chronic kidney disease) stage 4, GFR 15-29 ml/min (H)  Patient has refused recent lab draws at facility. Last labs were at [facility] when she was seen in the ED after a fall. 10/2/21 GFR 15    Iron deficiency anemia, unspecified iron deficiency anemia type  10/2/21 Hgb 9.9    Bipolar affective disorder, remission status unspecified (H)  Lewy body dementia without behavioral disturbance (H)  Patient had a fall 4/29, found sitting on the floor next to her bed. She reported to staff that she was dreaming and slid off the bed when she tried to sit up. She denies any pain today, says she feels fine. She has intermittent episodes of not speaking, won't open eyes, eat or take medications. These are typically short lived.       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  FAMILY HISTORY: family history includes C.A.D. in her brother, father, mother, and sister; Psychotic Disorder in her son.  SOCIAL HISTORY:  reports that she has never smoked. She has never used smokeless tobacco. She reports that she does not drink alcohol and does not use drugs.    MEDICATIONS:  Current Outpatient Medications   Medication Sig Dispense Refill     ACE/ARB/ARNI NOT  "PRESCRIBED (INTENTIONAL) Please choose reason not prescribed from choices below.       acetaminophen (TYLENOL) 500 MG tablet Take 500 mg by mouth 2 times daily and 1 tab every 4 hours as needed       donepezil (ARICEPT) 5 MG tablet Take 5 mg by mouth 2 times daily  30 tablet 0     ferrous sulfate (FEROSUL) 325 (65 Fe) MG tablet Take 1 tablet (325 mg) by mouth daily (with breakfast)       lamoTRIgine (LAMICTAL) 200 MG tablet Take 1 tablet (200 mg) by mouth every morning       mirtazapine (REMERON) 15 MG tablet Take 1 tablet (15 mg) by mouth At Bedtime       Multiple Vitamins-Minerals (PRESERVISION AREDS 2 PO) Take 1 tablet by mouth daily       ondansetron (ZOFRAN) 4 MG tablet Take 4 mg by mouth 4 times daily        pantoprazole (PROTONIX) 40 MG EC tablet Take 40 mg by mouth daily        QUEtiapine (SEROQUEL) 50 MG tablet Take 200 mg by mouth At Bedtime        sertraline (ZOLOFT) 100 MG tablet Take 1 tablet (100 mg) by mouth daily         Case Management:  I have reviewed the care plan and MDS and do agree with the plan. Patient's desire to return to the community is not present. Information reviewed:  Medications, vital signs, orders, and nursing notes.    ROS:  Limited secondary to cognitive impairment/cooperation but today pt reports 4 point ROS including Respiratory, CV, GI and , other than that noted in the HPI,  is negative    Vitals:  /78   Pulse 68   Temp 97.3  F (36.3  C)   Resp 18   Ht 1.575 m (5' 2\")   Wt 62.6 kg (138 lb)   SpO2 93%   BMI 25.24 kg/m    Body mass index is 25.24 kg/m .  Exam:  GENERAL APPEARANCE:  Patient is laying in bed, opens eyes to voice, no apparent distress  EYES:  EOM normal, conjunctiva and lids normal  RESP:  lungs clear to auscultation , no respiratory distress  CV:  Palpation and auscultation of heart done , regular rate and rhythm, no murmur, rub, or gallop, no edema  ABDOMEN:  bowel sounds normal, soft, non-tender  PSYCH:  insight and judgement impaired, memory " impaired , affect abnormal flat    Lab/Diagnostic data:   Recent labs in [facility] reviewed by me today.     ASSESSMENT/PLAN  (N18.4) CKD (chronic kidney disease) stage 4, GFR 15-29 ml/min (H)  (primary encounter diagnosis)  Comment: Etiology unknown. She does not have HTN or DM. Based on labs last fall, she has had some progression of the disease. No uremic symptoms. Would not recommend pursuing dialysis if this becomes necessary due to age and mental health issues.   Plan: Will periodically attempt to order labs as patient is occasionally compliant. Adjust medications based on CKD4. No nephrotoxic medications.    (D50.9) Iron deficiency anemia, unspecified iron deficiency anemia type  Comment: Hgb improved at last check. No s/sx bleeding.   Plan: Again, will periodically attempt labs    (F31.9) Bipolar affective disorder, remission status unspecified (H)  Comment: Chronic condition being managed with medications and continues to have expected and unavoidable exacerbations with current therapy.  Plan: Continue current POC with no changes at this time and adjustments as needed.    (G31.83,  F02.81) Lewy body dementia with behavioral disturbance (H)  Comment: Chronic, progressive. Requires 24 hour skilled nursing care. HPI/ROS difficult to obtain with cognitive impairment/psychiatric issues. Expect further functional and cognitive decline. Expect weight loss.  Plan: Continue 24 hour care. Monitor weight. Monitor functional status. Monitor for behavioral disturbances.      Electronically signed by:  ASCENCION Pope [facility]  Phone: [phone]

## 2022-05-05 NOTE — LETTER
5/5/2022        RE: Maribel Sevilla  [facility]  [address] [facility] [address]        [facility] GERIATRICS  Chief Complaint   Patient presents with     residential Regulatory     [place] Medical Record Number:  [id number]  Place of Service where encounter took place:  [facility] () [[id number]]    HPI:    Maribel Sevilla  is 89 year old (12/20/1931), who is being seen today for a federally mandated E/M visit.     Today's concerns are:  CKD (chronic kidney disease) stage 4, GFR 15-29 ml/min (H)  Patient has refused recent lab draws at facility. Last labs were at [facility] when she was seen in the ED after a fall. 10/2/21 GFR 15    Iron deficiency anemia, unspecified iron deficiency anemia type  10/2/21 Hgb 9.9    Bipolar affective disorder, remission status unspecified (H)  Lewy body dementia without behavioral disturbance (H)  Patient had a fall 4/29, found sitting on the floor next to her bed. She reported to staff that she was dreaming and slid off the bed when she tried to sit up. She denies any pain today, says she feels fine. She has intermittent episodes of not speaking, won't open eyes, eat or take medications. These are typically short lived.       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  FAMILY HISTORY: family history includes C.A.D. in her brother, father, mother, and sister; Psychotic Disorder in her son.  SOCIAL HISTORY:  reports that she has never smoked. She has never used smokeless tobacco. She reports that she does not drink alcohol and  "does not use drugs.    MEDICATIONS:  Current Outpatient Medications   Medication Sig Dispense Refill     ACE/ARB/ARNI NOT PRESCRIBED (INTENTIONAL) Please choose reason not prescribed from choices below.       acetaminophen (TYLENOL) 500 MG tablet Take 500 mg by mouth 2 times daily and 1 tab every 4 hours as needed       donepezil (ARICEPT) 5 MG tablet Take 5 mg by mouth 2 times daily  30 tablet 0     ferrous sulfate (FEROSUL) 325 (65 Fe) MG tablet Take 1 tablet (325 mg) by mouth daily (with breakfast)       lamoTRIgine (LAMICTAL) 200 MG tablet Take 1 tablet (200 mg) by mouth every morning       mirtazapine (REMERON) 15 MG tablet Take 1 tablet (15 mg) by mouth At Bedtime       Multiple Vitamins-Minerals (PRESERVISION AREDS 2 PO) Take 1 tablet by mouth daily       ondansetron (ZOFRAN) 4 MG tablet Take 4 mg by mouth 4 times daily        pantoprazole (PROTONIX) 40 MG EC tablet Take 40 mg by mouth daily        QUEtiapine (SEROQUEL) 50 MG tablet Take 200 mg by mouth At Bedtime        sertraline (ZOLOFT) 100 MG tablet Take 1 tablet (100 mg) by mouth daily         Case Management:  I have reviewed the care plan and MDS and do agree with the plan. Patient's desire to return to the community is not present. Information reviewed:  Medications, vital signs, orders, and nursing notes.    ROS:  Limited secondary to cognitive impairment/cooperation but today pt reports 4 point ROS including Respiratory, CV, GI and , other than that noted in the HPI,  is negative    Vitals:  /78   Pulse 68   Temp 97.3  F (36.3  C)   Resp 18   Ht 1.575 m (5' 2\")   Wt 62.6 kg (138 lb)   SpO2 93%   BMI 25.24 kg/m    Body mass index is 25.24 kg/m .  Exam:  GENERAL APPEARANCE:  Patient is laying in bed, opens eyes to voice, no apparent distress  EYES:  EOM normal, conjunctiva and lids normal  RESP:  lungs clear to auscultation , no respiratory distress  CV:  Palpation and auscultation of heart done , regular rate and rhythm, no murmur, " rub, or gallop, no edema  ABDOMEN:  bowel sounds normal, soft, non-tender  PSYCH:  insight and judgement impaired, memory impaired , affect abnormal flat    Lab/Diagnostic data:   Recent labs in [facility] reviewed by me today.     ASSESSMENT/PLAN  (N18.4) CKD (chronic kidney disease) stage 4, GFR 15-29 ml/min (H)  (primary encounter diagnosis)  Comment: Etiology unknown. She does not have HTN or DM. Based on labs last fall, she has had some progression of the disease. No uremic symptoms. Would not recommend pursuing dialysis if this becomes necessary due to age and mental health issues.   Plan: Will periodically attempt to order labs as patient is occasionally compliant. Adjust medications based on CKD4. No nephrotoxic medications.    (D50.9) Iron deficiency anemia, unspecified iron deficiency anemia type  Comment: Hgb improved at last check. No s/sx bleeding.   Plan: Again, will periodically attempt labs    (F31.9) Bipolar affective disorder, remission status unspecified (H)  Comment: Chronic condition being managed with medications and continues to have expected and unavoidable exacerbations with current therapy.  Plan: Continue current POC with no changes at this time and adjustments as needed.    (G31.83,  F02.81) Lewy body dementia with behavioral disturbance (H)  Comment: Chronic, progressive. Requires 24 hour skilled nursing care. HPI/ROS difficult to obtain with cognitive impairment/psychiatric issues. Expect further functional and cognitive decline. Expect weight loss.  Plan: Continue 24 hour care. Monitor weight. Monitor functional status. Monitor for behavioral disturbances.      Electronically signed by:  ASCENCION Pope [facility]  Phone: [phone]

## 2022-06-09 ENCOUNTER — NURSING HOME VISIT (OUTPATIENT)
Dept: GERIATRICS | Facility: CLINIC | Age: 87
End: 2022-06-09
Payer: COMMERCIAL

## 2022-06-09 VITALS
TEMPERATURE: 98.5 F | OXYGEN SATURATION: 95 % | BODY MASS INDEX: 25.4 KG/M2 | HEIGHT: 62 IN | RESPIRATION RATE: 18 BRPM | WEIGHT: 138 LBS | SYSTOLIC BLOOD PRESSURE: 130 MMHG | DIASTOLIC BLOOD PRESSURE: 76 MMHG | HEART RATE: 83 BPM

## 2022-06-09 DIAGNOSIS — G31.83 LEWY BODY DEMENTIA WITHOUT BEHAVIORAL DISTURBANCE (H): ICD-10-CM

## 2022-06-09 DIAGNOSIS — D50.9 IRON DEFICIENCY ANEMIA, UNSPECIFIED IRON DEFICIENCY ANEMIA TYPE: ICD-10-CM

## 2022-06-09 DIAGNOSIS — F31.9 BIPOLAR AFFECTIVE DISORDER, REMISSION STATUS UNSPECIFIED (H): ICD-10-CM

## 2022-06-09 DIAGNOSIS — N18.4 CKD (CHRONIC KIDNEY DISEASE) STAGE 4, GFR 15-29 ML/MIN (H): Primary | ICD-10-CM

## 2022-06-09 DIAGNOSIS — F02.80 LEWY BODY DEMENTIA WITHOUT BEHAVIORAL DISTURBANCE (H): ICD-10-CM

## 2022-06-09 PROCEDURE — 99309 SBSQ NF CARE MODERATE MDM 30: CPT | Performed by: NURSE PRACTITIONER

## 2022-06-09 NOTE — LETTER
"    6/9/2022        RE: Maribel Sevilla  [facility]  [address] [facility] [address]        [facility] GERIATRICS    Chief Complaint   Patient presents with     RECHECK     HPI:  Maribel Sevilla is a 89 year old  (12/20/1931), who is being seen today for an episodic care visit at: [facility] () [[id number]].     Today's concern is:   CKD (chronic kidney disease) stage 4, GFR 15-29 ml/min (H)  Patient did allow lab draw this week:  BUN 60  Creat 2.80  GFR 16    Iron deficiency anemia, unspecified iron deficiency anemia type  Hgb 9.9    Bipolar affective disorder, remission status unspecified (H)  Lewy body dementia without behavioral disturbance (H)  Patient was sent to the ED by night staff on 6/2/22 due to unresponsiveness. This nurse did not know the patient well as was not aware of her tendency to do this. She will periodically lay in bed with her eyes closed and not respond in any way to questions or touch. This is a well documented behavioral issue that she has. By the time she got to the ED she was talking, no acute illness found.  Per labs this week, A1c and LFTs WNL      Allergies, and PMH/PSH reviewed in EPIC today.  REVIEW OF SYSTEMS:  Patient answers all questions today with concerns that she thought she would be able to walk, but doesn't think she can. She just repeats this several times. She says she just wants to get back into bed, so provider alerts CNA    Objective:   /76   Pulse 83   Temp 98.5  F (36.9  C)   Resp 18   Ht 1.575 m (5' 2\")   Wt 62.6 kg (138 lb)   SpO2 95%   BMI 25.24 kg/m    GENERAL APPEARANCE:  Alert, anxious  ENT:  Mouth and posterior oropharynx normal, moist mucous membranes, normal hearing acuity  EYES:  EOM normal, conjunctiva and lids normal  RESP:  lungs clear to auscultation , no respiratory distress  CV:  Palpation and auscultation of heart done , regular rate and rhythm, no murmur, rub, or gallop, no edema  PSYCH:  " insight and judgement impaired, memory impaired , anxious    nursing home labs reviewed, not available in Epic    Assessment/Plan:  (N18.4) CKD (chronic kidney disease) stage 4, GFR 15-29 ml/min (H)  (primary encounter diagnosis)  Comment: Etiology unknown.  She does not have HTN or DM. Labs stable from one year ago.  Plan: Avoid nephrotoxic medications and adjust medications per renal function. Monitor renal function every 6 months or as patient allows    (D50.9) Iron deficiency anemia, unspecified iron deficiency anemia type  Comment: Hgb stable from last check. Would not decrease iron due to risk for worsening and difficulty with monitoring  Plan: Continue current POC with no changes at this time and adjustments as needed.    (F31.9) Bipolar affective disorder, remission status unspecified (H)  Comment: Chronic condition being managed with medications and continues to have expected and unavoidable exacerbations with current therapy.  Plan: Continue current POC with no changes at this time and adjustments as needed.    (G31.83,  F02.80) Lewy body dementia without behavioral disturbance (H)  Comment: Chronic, progressive. Requires 24 hour skilled nursing care. HPI/ROS difficult to obtain with cognitive impairment. Expect further functional and cognitive decline. Expect weight loss.  Plan: Continue 24 hour care. Monitor weight. Monitor functional status. Monitor for behavioral disturbances.      Electronically signed by: ASCENCION Pope [facility]  Phone: [phone]

## 2022-06-09 NOTE — PROGRESS NOTES
"[facility] GERIATRICS    Chief Complaint   Patient presents with     RECHECK     HPI:  Maribel Sevilla is a 89 year old  (12/20/1931), who is being seen today for an episodic care visit at: [facility] () [[id number]].     Today's concern is:   CKD (chronic kidney disease) stage 4, GFR 15-29 ml/min (H)  Patient did allow lab draw this week:  BUN 60  Creat 2.80  GFR 16    Iron deficiency anemia, unspecified iron deficiency anemia type  Hgb 9.9    Bipolar affective disorder, remission status unspecified (H)  Lewy body dementia without behavioral disturbance (H)  Patient was sent to the ED by night staff on 6/2/22 due to unresponsiveness. This nurse did not know the patient well as was not aware of her tendency to do this. She will periodically lay in bed with her eyes closed and not respond in any way to questions or touch. This is a well documented behavioral issue that she has. By the time she got to the ED she was talking, no acute illness found.  Per labs this week, A1c and LFTs WNL      Allergies, and PMH/PSH reviewed in EPIC today.  REVIEW OF SYSTEMS:  Patient answers all questions today with concerns that she thought she would be able to walk, but doesn't think she can. She just repeats this several times. She says she just wants to get back into bed, so provider alerts CNA    Objective:   /76   Pulse 83   Temp 98.5  F (36.9  C)   Resp 18   Ht 1.575 m (5' 2\")   Wt 62.6 kg (138 lb)   SpO2 95%   BMI 25.24 kg/m    GENERAL APPEARANCE:  Alert, anxious  ENT:  Mouth and posterior oropharynx normal, moist mucous membranes, normal hearing acuity  EYES:  EOM normal, conjunctiva and lids normal  RESP:  lungs clear to auscultation , no respiratory distress  CV:  Palpation and auscultation of heart done , regular rate and rhythm, no murmur, rub, or gallop, no edema  PSYCH:  insight and judgement impaired, memory impaired , anxious    nursing home labs reviewed, not available in " Epic    Assessment/Plan:  (N18.4) CKD (chronic kidney disease) stage 4, GFR 15-29 ml/min (H)  (primary encounter diagnosis)  Comment: Etiology unknown.  She does not have HTN or DM. Labs stable from one year ago.  Plan: Avoid nephrotoxic medications and adjust medications per renal function. Monitor renal function every 6 months or as patient allows    (D50.9) Iron deficiency anemia, unspecified iron deficiency anemia type  Comment: Hgb stable from last check. Would not decrease iron due to risk for worsening and difficulty with monitoring  Plan: Continue current POC with no changes at this time and adjustments as needed.    (F31.9) Bipolar affective disorder, remission status unspecified (H)  Comment: Chronic condition being managed with medications and continues to have expected and unavoidable exacerbations with current therapy.  Plan: Continue current POC with no changes at this time and adjustments as needed.    (G31.83,  F02.80) Lewy body dementia without behavioral disturbance (H)  Comment: Chronic, progressive. Requires 24 hour skilled nursing care. HPI/ROS difficult to obtain with cognitive impairment. Expect further functional and cognitive decline. Expect weight loss.  Plan: Continue 24 hour care. Monitor weight. Monitor functional status. Monitor for behavioral disturbances.      Electronically signed by: ASCENCION Pope [facility]  Phone: [phone]

## 2022-07-18 ENCOUNTER — NURSING HOME VISIT (OUTPATIENT)
Dept: GERIATRICS | Facility: CLINIC | Age: 87
End: 2022-07-18
Payer: COMMERCIAL

## 2022-07-18 VITALS
HEIGHT: 62 IN | SYSTOLIC BLOOD PRESSURE: 130 MMHG | BODY MASS INDEX: 25.17 KG/M2 | HEART RATE: 80 BPM | OXYGEN SATURATION: 92 % | RESPIRATION RATE: 16 BRPM | WEIGHT: 136.8 LBS | TEMPERATURE: 98.5 F | DIASTOLIC BLOOD PRESSURE: 75 MMHG

## 2022-07-18 DIAGNOSIS — D50.9 IRON DEFICIENCY ANEMIA, UNSPECIFIED IRON DEFICIENCY ANEMIA TYPE: ICD-10-CM

## 2022-07-18 DIAGNOSIS — G31.83 LEWY BODY DEMENTIA WITHOUT BEHAVIORAL DISTURBANCE (H): Primary | ICD-10-CM

## 2022-07-18 DIAGNOSIS — F02.80 LEWY BODY DEMENTIA WITHOUT BEHAVIORAL DISTURBANCE (H): Primary | ICD-10-CM

## 2022-07-18 DIAGNOSIS — N18.4 CKD (CHRONIC KIDNEY DISEASE) STAGE 4, GFR 15-29 ML/MIN (H): ICD-10-CM

## 2022-07-18 PROCEDURE — 99308 SBSQ NF CARE LOW MDM 20: CPT | Performed by: INTERNAL MEDICINE

## 2022-07-18 NOTE — LETTER
7/18/2022        RE: Maribel Sevilla  [facility]  [address] [facility] [address]        [facility] GERIATRICS  Chief Complaint   Patient presents with     half-way Regulatory     [place] Medical Record Number:  [id number]  Place of Service where encounter took place:  [facility] ()     HPI:    Maribel Sevilla  is 89 year old (12/20/1931), who is being seen today for a federally mandated E/M visit.     Course reviewed in [facility] and with NH staff    No acute concerns noted by staff    Pt states she feels poorly, is unable to clarify  She was evaluated in the ER recently for transient unresponsiveness which upon reflection is not unusual for her.    Maribel denies headache, nausea, vomiting, pain.      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  FAMILY HISTORY: family history includes C.A.D. in her brother, father, mother, and sister; Psychotic Disorder in her son.  SOCIAL HISTORY:  reports that she has never smoked. She has never used smokeless tobacco. She reports that she does not drink alcohol and does not use drugs.    MEDICATIONS:                          CONTINUE these medicines which have NOT CHANGED      Dose / Directions   ACE/ARB/ARNI NOT PRESCRIBED  Commonly known as: INTENTIONAL  Used for: CKD (chronic kidney disease) stage 4, GFR 15-29 ml/min (H)      Please choose reason not prescribed from choices below.  Refills: 0     acetaminophen 500 MG tablet  Commonly known as: TYLENOL      Dose: 500 mg  Take 500 mg by mouth 2 times daily and 1 tab  "every 4 hours as needed  Refills: 0     donepezil 5 MG tablet  Commonly known as: ARICEPT      Dose: 5 mg  Take 5 mg by mouth 2 times daily  Quantity: 30 tablet  Refills: 0     ferrous sulfate 325 (65 Fe) MG tablet  Commonly known as: FEROSUL  Used for: Iron deficiency anemia, unspecified iron deficiency anemia type      Dose: 325 mg  Take 1 tablet (325 mg) by mouth daily (with breakfast)  Refills: 0     lamoTRIgine 200 MG tablet  Commonly known as: LaMICtal  Used for: Lewy body dementia without behavioral disturbance (H)      Dose: 200 mg  Take 1 tablet (200 mg) by mouth every morning  Refills: 0     mirtazapine 15 MG tablet  Commonly known as: Remeron      Dose: 15 mg  Take 1 tablet (15 mg) by mouth At Bedtime  Refills: 0     ondansetron 4 MG tablet  Commonly known as: ZOFRAN      Dose: 4 mg  Take 4 mg by mouth 4 times daily  Refills: 0     pantoprazole 40 MG EC tablet  Commonly known as: PROTONIX      Dose: 40 mg  Take 40 mg by mouth daily  Refills: 0     PRESERVISION AREDS 2 PO      Dose: 1 tablet  Take 1 tablet by mouth daily  Refills: 0     QUEtiapine 50 MG tablet  Commonly known as: SEROquel      Dose: 200 mg  Take 200 mg by mouth At Bedtime  Refills: 0     sertraline 100 MG tablet  Commonly known as: Zoloft  Used for: Lewy body dementia without behavioral disturbance (H)      Dose: 100 mg  Take 1 tablet (100 mg) by mouth daily  Refills: 0          ROS:  Neg except as noted above    Vitals:  /75   Pulse 80   Temp 98.5  F (36.9  C)   Resp 16   Ht 1.575 m (5' 2\")   Wt 62.1 kg (136 lb 12.8 oz)   SpO2 92%   BMI 25.02 kg/m    Body mass index is 25.02 kg/m .  Exam:    Sleepy, alerts to name, pleasant, in no distress even though she complains of feeling poorly.  Oral mucosa moist  Lungs clear  CV regular rhythm  Abdomen soft  Oriented to person      Assessment      Vague complaints of not feeling well, chronic.  No evidence by exam to suggest acute metabolic process.  Historically patient has refused " evaluation including screening lab tests  Plan: Monitor    Lewy Body dementia  History of bipolar affect disorder  Stable mental functional status  Plan: Long-term care     History of anemia, on Fe, without recent labs  Plan: Obtain labs if patient permits    CKD stage 4, unclear status, as Pt has refused labs  Plan: Obtain labs if patient permits.        Arjun Arreguin MD            Sincerely,        Arjun Arreguin MD

## 2022-07-18 NOTE — PROGRESS NOTES
[facility] GERIATRICS  Chief Complaint   Patient presents with     senior care Regulatory     [place] Medical Record Number:  [id number]  Place of Service where encounter took place:  [facility] ()     HPI:    Maribel Sevilla  is 89 year old (12/20/1931), who is being seen today for a federally mandated E/M visit.     Course reviewed in [facility] and with NH staff    No acute concerns noted by staff    Pt states she feels poorly, is unable to clarify  She was evaluated in the ER recently for transient unresponsiveness which upon reflection is not unusual for her.    Maribel denies headache, nausea, vomiting, pain.      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  FAMILY HISTORY: family history includes C.A.D. in her brother, father, mother, and sister; Psychotic Disorder in her son.  SOCIAL HISTORY:  reports that she has never smoked. She has never used smokeless tobacco. She reports that she does not drink alcohol and does not use drugs.    MEDICATIONS:                          CONTINUE these medicines which have NOT CHANGED      Dose / Directions   ACE/ARB/ARNI NOT PRESCRIBED  Commonly known as: INTENTIONAL  Used for: CKD (chronic kidney disease) stage 4, GFR 15-29 ml/min (H)      Please choose reason not prescribed from choices below.  Refills: 0     acetaminophen 500 MG tablet  Commonly known as: TYLENOL      Dose: 500 mg  Take 500 mg by mouth 2 times daily and 1 tab every 4 hours as needed  Refills: 0     donepezil 5 MG tablet  Commonly known as: ARICEPT      Dose: 5 mg  Take 5 mg by  "mouth 2 times daily  Quantity: 30 tablet  Refills: 0     ferrous sulfate 325 (65 Fe) MG tablet  Commonly known as: FEROSUL  Used for: Iron deficiency anemia, unspecified iron deficiency anemia type      Dose: 325 mg  Take 1 tablet (325 mg) by mouth daily (with breakfast)  Refills: 0     lamoTRIgine 200 MG tablet  Commonly known as: LaMICtal  Used for: Lewy body dementia without behavioral disturbance (H)      Dose: 200 mg  Take 1 tablet (200 mg) by mouth every morning  Refills: 0     mirtazapine 15 MG tablet  Commonly known as: Remeron      Dose: 15 mg  Take 1 tablet (15 mg) by mouth At Bedtime  Refills: 0     ondansetron 4 MG tablet  Commonly known as: ZOFRAN      Dose: 4 mg  Take 4 mg by mouth 4 times daily  Refills: 0     pantoprazole 40 MG EC tablet  Commonly known as: PROTONIX      Dose: 40 mg  Take 40 mg by mouth daily  Refills: 0     PRESERVISION AREDS 2 PO      Dose: 1 tablet  Take 1 tablet by mouth daily  Refills: 0     QUEtiapine 50 MG tablet  Commonly known as: SEROquel      Dose: 200 mg  Take 200 mg by mouth At Bedtime  Refills: 0     sertraline 100 MG tablet  Commonly known as: Zoloft  Used for: Lewy body dementia without behavioral disturbance (H)      Dose: 100 mg  Take 1 tablet (100 mg) by mouth daily  Refills: 0          ROS:  Neg except as noted above    Vitals:  /75   Pulse 80   Temp 98.5  F (36.9  C)   Resp 16   Ht 1.575 m (5' 2\")   Wt 62.1 kg (136 lb 12.8 oz)   SpO2 92%   BMI 25.02 kg/m    Body mass index is 25.02 kg/m .  Exam:    Sleepy, alerts to name, pleasant, in no distress even though she complains of feeling poorly.  Oral mucosa moist  Lungs clear  CV regular rhythm  Abdomen soft  Oriented to person      Assessment      Vague complaints of not feeling well, chronic.  No evidence by exam to suggest acute metabolic process.  Historically patient has refused evaluation including screening lab tests  Plan: Monitor    Lewy Body dementia  History of bipolar affect disorder  Stable " mental functional status  Plan: Long-term care     History of anemia, on Fe, without recent labs  Plan: Obtain labs if patient permits    CKD stage 4, unclear status, as Pt has refused labs  Plan: Obtain labs if patient permits.        Arjun Arreguin MD

## 2022-08-22 ENCOUNTER — PATIENT OUTREACH (OUTPATIENT)
Dept: GERIATRIC MEDICINE | Facility: CLINIC | Age: 87
End: 2022-08-22

## 2022-09-21 ENCOUNTER — NURSING HOME VISIT (OUTPATIENT)
Dept: GERIATRICS | Facility: CLINIC | Age: 87
End: 2022-09-21
Payer: COMMERCIAL

## 2022-09-21 VITALS
TEMPERATURE: 97.2 F | HEART RATE: 74 BPM | WEIGHT: 133.8 LBS | RESPIRATION RATE: 18 BRPM | HEIGHT: 62 IN | DIASTOLIC BLOOD PRESSURE: 84 MMHG | BODY MASS INDEX: 24.62 KG/M2 | OXYGEN SATURATION: 92 % | SYSTOLIC BLOOD PRESSURE: 143 MMHG

## 2022-09-21 DIAGNOSIS — D64.9 ANEMIA, UNSPECIFIED TYPE: ICD-10-CM

## 2022-09-21 DIAGNOSIS — G31.83 LEWY BODY DEMENTIA WITHOUT BEHAVIORAL DISTURBANCE (H): ICD-10-CM

## 2022-09-21 DIAGNOSIS — R41.89 UNRESPONSIVENESS: Primary | ICD-10-CM

## 2022-09-21 DIAGNOSIS — F02.80 LEWY BODY DEMENTIA WITHOUT BEHAVIORAL DISTURBANCE (H): ICD-10-CM

## 2022-09-21 DIAGNOSIS — N18.4 CKD (CHRONIC KIDNEY DISEASE) STAGE 4, GFR 15-29 ML/MIN (H): ICD-10-CM

## 2022-09-21 DIAGNOSIS — F31.9 BIPOLAR AFFECTIVE DISORDER, REMISSION STATUS UNSPECIFIED (H): ICD-10-CM

## 2022-09-21 PROCEDURE — 99309 SBSQ NF CARE MODERATE MDM 30: CPT

## 2022-09-21 NOTE — PROGRESS NOTES
[facility] GERIATRICS  Chief Complaint   Patient presents with     penitentiary Regulatory     [place] Medical Record Number:  [id number]  Place of Service where encounter took place:  [facility] () [[id number]]    HPI:    Maribel Sevilla  is 89 year old (12/20/1931), who is being seen today for a federally mandated E/M visit. Today's concerns are: Routine LTC follow-up visit    Patient being seen today for routine LTC follow-up visit.  Recently evaluated in the ER for transient unresponsiveness which is not unusual for the patient.  Today, she is in bed sleepy.  No acute distress.  No reports of headache, dizziness, chest pain or shortness of breath.  Weight stable the past 3 months.  Staff without acute concerns.      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  FAMILY HISTORY: family history includes C.A.D. in her brother, father, mother, and sister; Psychotic Disorder in her son.  SOCIAL HISTORY:  reports that she has never smoked. She has never used smokeless tobacco. She reports that she does not drink alcohol and does not use drugs.    MEDICATIONS:}  Post Medication Reconciliation Status: Patient was not discharged from an inpatient facility or TCU       CONTINUE these medicines which have NOT CHANGED      Dose / Directions   ACE/ARB/ARNI NOT PRESCRIBED  Commonly known as: INTENTIONAL  Used for: CKD (chronic kidney disease) stage 4, GFR 15-29 ml/min (H)      Please choose reason not prescribed from choices below.  Refills: 0     acetaminophen 500 MG tablet  Commonly known as: TYLENOL    "   Dose: 500 mg  Take 500 mg by mouth 2 times daily and 1 tab every 4 hours as needed  Refills: 0     donepezil 5 MG tablet  Commonly known as: ARICEPT      Dose: 5 mg  Take 5 mg by mouth 2 times daily  Quantity: 30 tablet  Refills: 0     ferrous sulfate 325 (65 Fe) MG tablet  Commonly known as: FEROSUL  Used for: Iron deficiency anemia, unspecified iron deficiency anemia type      Dose: 325 mg  Take 1 tablet (325 mg) by mouth daily (with breakfast)  Refills: 0     lamoTRIgine 200 MG tablet  Commonly known as: LaMICtal  Used for: Lewy body dementia without behavioral disturbance (H)      Dose: 200 mg  Take 1 tablet (200 mg) by mouth every morning  Refills: 0     mirtazapine 15 MG tablet  Commonly known as: Remeron      Dose: 15 mg  Take 1 tablet (15 mg) by mouth At Bedtime  Refills: 0     ondansetron 4 MG tablet  Commonly known as: ZOFRAN      Dose: 4 mg  Take 4 mg by mouth 4 times daily  Refills: 0     pantoprazole 40 MG EC tablet  Commonly known as: PROTONIX      Dose: 40 mg  Take 40 mg by mouth daily  Refills: 0     PRESERVISION AREDS 2 PO      Dose: 1 tablet  Take 1 tablet by mouth daily  Refills: 0     QUEtiapine 50 MG tablet  Commonly known as: SEROquel      Dose: 200 mg  Take 200 mg by mouth At Bedtime  Refills: 0     sertraline 100 MG tablet  Commonly known as: Zoloft  Used for: Lewy body dementia without behavioral disturbance (H)      Dose: 100 mg  Take 1 tablet (100 mg) by mouth daily  Refills: 0          Case Management:  I have reviewed the care plan and MDS and do agree with the plan. Patient's desire to return to the community is not assessible due to cognitive impairment. Information reviewed:  Medications, vital signs, orders, and nursing notes.    ROS:  Limited secondary to cognitive impairment but today pt reports no pain or discomfort.     Vitals:  BP (!) 143/84   Pulse 74   Temp 97.2  F (36.2  C)   Resp 18   Ht 1.575 m (5' 2\")   Wt 60.7 kg (133 lb 12.8 oz)   SpO2 92%   BMI 24.47 kg/m  "   Body mass index is 24.47 kg/m .     Exam:  GENERAL APPEARANCE: Sleepy, no acute distress       RESPIRATORY: Clear to auscultation, even and unlabored, symmetrical chest wall expansion  CARDIOVASCULAR: RRR, no peripheral edema, S1/S2 normal, pulses positive  GASTROINTESTINAL: Soft, nontender, nondistended, bowel sounds present x4 quadrants  MUSCULOSKELETAL: In bed, gait not assessed  NEUROLOGICAL: Alert to self, memory loss  PSYCHOLOGICAL: Sleepy    Labs:  Most recent labs reviewed by me in epic      ASSESSMENT/PLAN:  Unresponsiveness, chronic  -Vague complaints of not feeling well at baseline, exam benign  -Monitor for changes in cognition status    Lewy body dementia  Bipolar affective disorder  -Stable  -Continue current care, monitor for changes in mood and behavior    Chronic kidney disease stage IV  -Noncompliant with blood draws  -Continue current care, BMP when patient allows, avoid nephrotoxic agents, monitor for changes in renal function    Anemia  -Patient has refused labs  -Continue current care, labs when patient permits        Electronically signed by:  Pinky Dawn,HARRY,APRN,NILOP-BC.           The above note was completed in part using Dragon voice recognition software. Although reviewed after completion, some word and grammatical errors may occur. Please contact the author of this note with any questions.

## 2022-09-21 NOTE — LETTER
9/21/2022        RE: Maribel Sevilla  [facility]  [address] [facility] [address]        [facility] GERIATRICS  Chief Complaint   Patient presents with     halfway Regulatory     [place] Medical Record Number:  [id number]  Place of Service where encounter took place:  [facility] () [[id number]]    HPI:    Maribel Sevilla  is 89 year old (12/20/1931), who is being seen today for a federally mandated E/M visit. Today's concerns are: Routine LTC follow-up visit    Patient being seen today for routine LTC follow-up visit.  Recently evaluated in the ER for transient unresponsiveness which is not unusual for the patient.  Today, she is in bed sleepy.  No acute distress.  No reports of headache, dizziness, chest pain or shortness of breath.  Weight stable the past 3 months.  Staff without acute concerns.      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  FAMILY HISTORY: family history includes C.A.D. in her brother, father, mother, and sister; Psychotic Disorder in her son.  SOCIAL HISTORY:  reports that she has never smoked. She has never used smokeless tobacco. She reports that she does not drink alcohol and does not use drugs.    MEDICATIONS:}  Post Medication Reconciliation Status: Patient was not discharged from an inpatient facility or TCU       CONTINUE these medicines which have NOT CHANGED      Dose / Directions   ACE/ARB/ARNI NOT PRESCRIBED  Commonly known as: INTENTIONAL  Used for: CKD (chronic kidney disease) stage 4, GFR 15-29 ml/min (H)      Please  choose reason not prescribed from choices below.  Refills: 0     acetaminophen 500 MG tablet  Commonly known as: TYLENOL      Dose: 500 mg  Take 500 mg by mouth 2 times daily and 1 tab every 4 hours as needed  Refills: 0     donepezil 5 MG tablet  Commonly known as: ARICEPT      Dose: 5 mg  Take 5 mg by mouth 2 times daily  Quantity: 30 tablet  Refills: 0     ferrous sulfate 325 (65 Fe) MG tablet  Commonly known as: FEROSUL  Used for: Iron deficiency anemia, unspecified iron deficiency anemia type      Dose: 325 mg  Take 1 tablet (325 mg) by mouth daily (with breakfast)  Refills: 0     lamoTRIgine 200 MG tablet  Commonly known as: LaMICtal  Used for: Lewy body dementia without behavioral disturbance (H)      Dose: 200 mg  Take 1 tablet (200 mg) by mouth every morning  Refills: 0     mirtazapine 15 MG tablet  Commonly known as: Remeron      Dose: 15 mg  Take 1 tablet (15 mg) by mouth At Bedtime  Refills: 0     ondansetron 4 MG tablet  Commonly known as: ZOFRAN      Dose: 4 mg  Take 4 mg by mouth 4 times daily  Refills: 0     pantoprazole 40 MG EC tablet  Commonly known as: PROTONIX      Dose: 40 mg  Take 40 mg by mouth daily  Refills: 0     PRESERVISION AREDS 2 PO      Dose: 1 tablet  Take 1 tablet by mouth daily  Refills: 0     QUEtiapine 50 MG tablet  Commonly known as: SEROquel      Dose: 200 mg  Take 200 mg by mouth At Bedtime  Refills: 0     sertraline 100 MG tablet  Commonly known as: Zoloft  Used for: Lewy body dementia without behavioral disturbance (H)      Dose: 100 mg  Take 1 tablet (100 mg) by mouth daily  Refills: 0          Case Management:  I have reviewed the care plan and MDS and do agree with the plan. Patient's desire to return to the community is not assessible due to cognitive impairment. Information reviewed:  Medications, vital signs, orders, and nursing notes.    ROS:  Limited secondary to cognitive impairment but today pt reports no pain or discomfort.     Vitals:  BP (!) 143/84   Pulse 74   " Temp 97.2  F (36.2  C)   Resp 18   Ht 1.575 m (5' 2\")   Wt 60.7 kg (133 lb 12.8 oz)   SpO2 92%   BMI 24.47 kg/m    Body mass index is 24.47 kg/m .     Exam:  GENERAL APPEARANCE: Sleepy, no acute distress       RESPIRATORY: Clear to auscultation, even and unlabored, symmetrical chest wall expansion  CARDIOVASCULAR: RRR, no peripheral edema, S1/S2 normal, pulses positive  GASTROINTESTINAL: Soft, nontender, nondistended, bowel sounds present x4 quadrants  MUSCULOSKELETAL: In bed, gait not assessed  NEUROLOGICAL: Alert to self, memory loss  PSYCHOLOGICAL: Sleepy    Labs:  Most recent labs reviewed by me in epic      ASSESSMENT/PLAN:  Unresponsiveness, chronic  -Vague complaints of not feeling well at baseline, exam benign  -Monitor for changes in cognition status    Lewy body dementia  Bipolar affective disorder  -Stable  -Continue current care, monitor for changes in mood and behavior    Chronic kidney disease stage IV  -Noncompliant with blood draws  -Continue current care, BMP when patient allows, avoid nephrotoxic agents, monitor for changes in renal function    Anemia  -Patient has refused labs  -Continue current care, labs when patient permits        Electronically signed by:  Pinky Dawn DNP,ASCENCION,AGNP-BC.           The above note was completed in part using Dragon voice recognition software. Although reviewed after completion, some word and grammatical errors may occur. Please contact the author of this note with any questions.                 Sincerely,        ASCENCION Galicia CNP      "

## 2022-09-28 ENCOUNTER — PATIENT OUTREACH (OUTPATIENT)
Dept: GERIATRIC MEDICINE | Facility: CLINIC | Age: 87
End: 2022-09-28

## 2022-09-28 NOTE — PROGRESS NOTES
Encounter opened due to Regulatory [employer] Update to open FVP Program.    Farida Sevilla  Care Management Specialist Manager  [facility]  [phone]

## 2022-09-28 NOTE — PROGRESS NOTES
Encounter opened due to Regulatory [employer] Update to close FVP Program.    Farida Sevilla  Care Management Specialist Manager  [facility]  [phone]

## 2022-10-17 ENCOUNTER — PATIENT OUTREACH (OUTPATIENT)
Dept: GERIATRIC MEDICINE | Facility: CLINIC | Age: 87
End: 2022-10-17

## 2022-10-17 ASSESSMENT — ACTIVITIES OF DAILY LIVING (ADL)
DEPENDENT_IADLS:: CLEANING;COOKING;LAUNDRY;SHOPPING;MEAL PREPARATION;MEDICATION MANAGEMENT;MONEY MANAGEMENT;TRANSPORTATION;INCONTINENCE

## 2022-10-17 ASSESSMENT — PATIENT HEALTH QUESTIONNAIRE - PHQ9: SUM OF ALL RESPONSES TO PHQ QUESTIONS 1-9: 2
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
## 2022-11-21 ENCOUNTER — NURSING HOME VISIT (OUTPATIENT)
Dept: GERIATRICS | Facility: CLINIC | Age: 87
End: 2022-11-21
Payer: COMMERCIAL

## 2022-11-21 VITALS
RESPIRATION RATE: 16 BRPM | HEART RATE: 68 BPM | BODY MASS INDEX: 24.29 KG/M2 | OXYGEN SATURATION: 93 % | HEIGHT: 62 IN | TEMPERATURE: 96.8 F | DIASTOLIC BLOOD PRESSURE: 74 MMHG | WEIGHT: 132 LBS | SYSTOLIC BLOOD PRESSURE: 120 MMHG

## 2022-11-21 DIAGNOSIS — D64.9 ANEMIA, UNSPECIFIED TYPE: ICD-10-CM

## 2022-11-21 DIAGNOSIS — R11.15 NON-INTRACTABLE CYCLICAL VOMITING WITHOUT NAUSEA: ICD-10-CM

## 2022-11-21 DIAGNOSIS — G31.83 LEWY BODY DEMENTIA WITHOUT BEHAVIORAL DISTURBANCE (H): Primary | ICD-10-CM

## 2022-11-21 DIAGNOSIS — F02.80 LEWY BODY DEMENTIA WITHOUT BEHAVIORAL DISTURBANCE (H): Primary | ICD-10-CM

## 2022-11-21 PROCEDURE — 99308 SBSQ NF CARE LOW MDM 20: CPT | Performed by: INTERNAL MEDICINE

## 2022-11-21 NOTE — PROGRESS NOTES
[facility] GERIATRICS  Chief Complaint   Patient presents with     [facility] Medical Record Number:  [id number]  Place of Service where encounter took place:  [facility] ()     HPI:    Maribel Sevilla  is 89 year old (12/20/1931), who is being seen today for a federally mandated E/M visit    Course reviewed with nursing home staff  Pt has continued to have episodes of decreased responsiveness and abd discomfort  This am Pt is sitting up in bed, states she doesn't feel well, just had an emesis of what appears to medication  She is not complaining of abd pain      ALLERGIES:No known allergies  PAST MEDICAL HISTORY:   Past Medical History:   Diagnosis Date     Anemia      Anxiety      Bipolar affective (H)      Dementia (H)     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 If you have any questions, ask your nurse or doctor.            CONTINUE these medicines which have NOT CHANGED      Dose / Directions   ACE/ARB/ARNI NOT PRESCRIBED  Commonly known as: INTENTIONAL  Used for: CKD (chronic kidney disease) stage 4, GFR 15-29 ml/min (H)      Please choose reason not prescribed from choices below.  Refills: 0     acetaminophen 500 MG tablet  Commonly known as: TYLENOL      Dose: 500 mg  Take 500 mg by mouth 2 times daily and 1 tab every 4 hours as needed  Refills: 0     donepezil 5 MG tablet  Commonly known as: ARICEPT      Dose: 5 mg  Take 5 mg by mouth 2 times daily  Quantity: 30 tablet  Refills: 0     ferrous sulfate 325 (65 Fe) MG tablet  Commonly known as: FEROSUL  Used for: Iron deficiency anemia, unspecified iron deficiency anemia type      Dose: 325 mg  Take 1 tablet (325 mg) by mouth daily (with breakfast)  Refills: 0     lamoTRIgine 200 MG tablet  Commonly known as: LaMICtal  Used for: Lewy  "body dementia without behavioral disturbance (H)      Dose: 200 mg  Take 1 tablet (200 mg) by mouth every morning  Refills: 0     mirtazapine 15 MG tablet  Commonly known as: Remeron      Dose: 15 mg  Take 1 tablet (15 mg) by mouth At Bedtime  Refills: 0     ondansetron 4 MG tablet  Commonly known as: ZOFRAN      Dose: 4 mg  Take 4 mg by mouth 4 times daily  Refills: 0     pantoprazole 40 MG EC tablet  Commonly known as: PROTONIX      Dose: 40 mg  Take 40 mg by mouth daily  Refills: 0     PRESERVISION AREDS 2 PO      Dose: 1 tablet  Take 1 tablet by mouth daily  Refills: 0     QUEtiapine 50 MG tablet  Commonly known as: SEROquel      Dose: 200 mg  Take 200 mg by mouth At Bedtime  Refills: 0     sertraline 100 MG tablet  Commonly known as: Zoloft  Used for: Lewy body dementia without behavioral disturbance (H)      Dose: 100 mg  Take 1 tablet (100 mg) by mouth daily  Refills: 0             Vitals:  /74   Pulse 68   Temp 96.8  F (36  C)   Resp 16   Ht 1.575 m (5' 2\")   Wt 59.9 kg (132 lb)   SpO2 93%   BMI 24.14 kg/m    Body mass index is 24.14 kg/m .  Exam:  Sleepy, sitting up in chair,recent emesis  Oriented to self  Lungs clear  CV rrr  Abd non-distended, non-tender        ASSESSMENT/PLAN    Lewy Body Dementia  History of Bipolar disorder  Overall stable mental and functional status  Plan continue donepazil, lamotrigine, mirtazapine, seroquel    Episodic unresponsive episodes  Etiology unclear  Plan monitor vitals, neuro status    Episodic c/o abd pain  abd exam is benign  Plan monitor GI status, intake, wt    CKD stage 4  Last Cr 3.03  1 year ago; Pt has refused blood draws to f/u  Plan monitor clinical status    History of anemia  Hgb 9.9 on 10/2/21  No f/u as Pt has refused labs      Arjun Arreguin MD              "

## 2023-01-09 ENCOUNTER — NURSING HOME VISIT (OUTPATIENT)
Dept: GERIATRICS | Facility: CLINIC | Age: 88
End: 2023-01-09
Payer: COMMERCIAL

## 2023-01-09 VITALS
HEIGHT: 62 IN | HEART RATE: 86 BPM | WEIGHT: 130 LBS | BODY MASS INDEX: 23.92 KG/M2 | SYSTOLIC BLOOD PRESSURE: 128 MMHG | OXYGEN SATURATION: 94 % | DIASTOLIC BLOOD PRESSURE: 72 MMHG | TEMPERATURE: 97.5 F | RESPIRATION RATE: 18 BRPM

## 2023-01-09 DIAGNOSIS — Z13.6 SCREENING FOR HYPERTENSION: ICD-10-CM

## 2023-01-09 DIAGNOSIS — N18.4 CKD (CHRONIC KIDNEY DISEASE) STAGE 4, GFR 15-29 ML/MIN (H): ICD-10-CM

## 2023-01-09 DIAGNOSIS — G31.83 LEWY BODY DEMENTIA WITHOUT BEHAVIORAL DISTURBANCE (H): ICD-10-CM

## 2023-01-09 DIAGNOSIS — F33.2 SEVERE EPISODE OF RECURRENT MAJOR DEPRESSIVE DISORDER, WITHOUT PSYCHOTIC FEATURES (H): ICD-10-CM

## 2023-01-09 DIAGNOSIS — K21.9 GASTROESOPHAGEAL REFLUX DISEASE WITHOUT ESOPHAGITIS: ICD-10-CM

## 2023-01-09 DIAGNOSIS — R11.2 NAUSEA AND VOMITING, UNSPECIFIED VOMITING TYPE: ICD-10-CM

## 2023-01-09 DIAGNOSIS — F02.80 LEWY BODY DEMENTIA WITHOUT BEHAVIORAL DISTURBANCE (H): ICD-10-CM

## 2023-01-09 DIAGNOSIS — R10.84 ABDOMINAL PAIN, GENERALIZED: ICD-10-CM

## 2023-01-09 DIAGNOSIS — F31.9 BIPOLAR AFFECTIVE DISORDER, REMISSION STATUS UNSPECIFIED (H): ICD-10-CM

## 2023-01-09 DIAGNOSIS — R41.89 UNRESPONSIVENESS: Primary | ICD-10-CM

## 2023-01-09 DIAGNOSIS — D64.9 ANEMIA, UNSPECIFIED TYPE: ICD-10-CM

## 2023-01-09 PROCEDURE — 99310 SBSQ NF CARE HIGH MDM 45: CPT

## 2023-01-09 NOTE — LETTER
1/9/2023        RE: Maribel Sevilla  [facility]  [address] [facility] [address]        [facility]  Chief Complaint   Patient presents with     Annual Comprehensive Nursing Home     [place] Medical Record Number:  [id number]  Place of Service where encounter took place:  [facility] () [[id number]]     HPI:    Maribel Sevilla  is a 89 year old  (12/20/1931), who is being seen today for an annual comprehensive visit. HPI information obtained from: facility chart records, facility staff, patient report and [facility] chart review.     Patient evaluated in the ER on 6/2/22 for transient unresponsiveness which is not unusual for her.  Today, she is in bed without acute distress.  States she is fine.  No reports of abdominal pain, nausea, vomiting, headache, dizziness, chest pain or shortness of breath.  Weight stable past 3 months.  Staff without acute concerns today.     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  IMMUNIZATIONS:  Immunization History   Administered Date(s) Administered     COVID-19 Vaccine 18+ (Moderna) 12/30/2020, 01/27/2021, 11/17/2021     FLUAD(HD)65+ QUAD 10/08/2021     Influenza (IIV3) PF 10/30/2014, 10/05/2015, 10/18/2016, 10/02/2017, 09/28/2018, 10/03/2019     Influenza Vaccine 65+ (Fluzone HD) 10/16/2020     Mantoux Tuberculin Skin Test 08/20/2011     Pneumo Conj 13-V (2010&after) 10/05/2015     Pneumococcal 23 valent 08/20/2011, 10/23/2013     TD (ADULT, 7+) 10/29/2011, 10/30/2011, 03/15/2017     Tdap (Adacel,Boostrix)  03/15/2017     Above immunizations pulled from [facility]. MIIC and facility records also reconciled. Outstanding information sent to  to update [facility] .  Future immunizations are not needed at this point as all recommended immunizations are up to date.       Current Outpatient Medications:      ACE/ARB/ARNI NOT PRESCRIBED (INTENTIONAL), Please choose reason not prescribed from choices below., Disp: , Rfl:      acetaminophen (TYLENOL) 500 MG tablet, Take 500 mg by mouth 2 times daily and 1 tab every 4 hours as needed, Disp: , Rfl:      donepezil (ARICEPT) 5 MG tablet, Take 5 mg by mouth 2 times daily , Disp: 30 tablet, Rfl: 0     ferrous sulfate (FEROSUL) 325 (65 Fe) MG tablet, Take 1 tablet (325 mg) by mouth daily (with breakfast), Disp: , Rfl:      lamoTRIgine (LAMICTAL) 200 MG tablet, Take 1 tablet (200 mg) by mouth every morning, Disp: , Rfl:      mirtazapine (REMERON) 15 MG tablet, Take 1 tablet (15 mg) by mouth At Bedtime, Disp: , Rfl:      Multiple Vitamins-Minerals (PRESERVISION AREDS 2 PO), Take 1 tablet by mouth daily, Disp: , Rfl:      ondansetron (ZOFRAN) 4 MG tablet, Take 4 mg by mouth 4 times daily , Disp: , Rfl:      pantoprazole (PROTONIX) 40 MG EC tablet, Take 40 mg by mouth daily , Disp: , Rfl:      QUEtiapine (SEROQUEL) 50 MG tablet, Take 200 mg by mouth At Bedtime , Disp: , Rfl:      sertraline (ZOLOFT) 100 MG tablet, Take 1 tablet (100 mg) by mouth daily, Disp: , Rfl:      MED REC REQUIRED   Post Medication Reconciliation Status:  Patient was not discharged from an inpatient facility or TCU    Case Management:  I have reviewed the facility/SNF care plan/MDS, including the falls risk, nutrition and pain screening. I also reviewed the current immunizations, and preventive care.. Future cancer screening is not clinically indicated secondary to age/goals of care Patient's desire to return to the community is not assessible due to cognitive impairment. Current Level of Care  "is appropriate.    Advance Directive Discussion:    I reviewed the current advanced directives as reflected in EPIC, the POLST and the facility chart, and verified the congruency of orders . I contacted the first party  and discussed the plan of Care.  I did not due to cognitive impairment review the advance directives with the resident. Patient's goal is unobtainable secondary to cognitive impairment.    Team Discussion:  I communicated with the appropriate disciplines involved with the Plan of Care:   Nursing    Information reviewed:  Medications, vital signs, orders, and nursing notes.    ROS:  Limited secondary to cognitive impairment but today pt reports fine.     Vitals:  /72   Pulse 86   Temp 97.5  F (36.4  C)   Resp 18   Ht 1.575 m (5' 2\")   Wt 59 kg (130 lb)   SpO2 94%   BMI 23.78 kg/m   Body mass index is 23.78 kg/m .     Exam:  GENERAL APPEARANCE: NAD, in bed   HEENT-EARS: No discharge/drainage, [place]  HEENT-NECK: No lymphadenopathy  HEENT-EYES: PERRLA positive, no drainage/discharge  HEENT-NOSE/MOUTH/THROAT: No nasal drainage or erythema, mucous membranes dry   RESPIRATORY: Clear to auscultation, even and unlabored, symmetrical chest wall expansion  CARDIOVASCULAR: RRR, no peripheral edema, S1/S2 normal   GASTROINTESTINAL: Soft, nontender, nondistended, bowel sounds positive all quadrants  MUSCULOSKELETAL: In bed, gait not assessed   INTEGUMENTARY: Visualized areas intact  NEUROLOGICAL: Alert to self, memory loss, confusion, impaired balance  PSYCHOLOGICAL: Cooperative, calm     Lab/Diagnostic data:   Recent labs in [facility] reviewed by me today.     ASSESSMENT/PLAN:  Unresponsiveness episodes, unclear etiology  -Ongoing vague complains of not feeling well at baseline, exam benign today  -Monitor for changes in mental status    Abdominal pain  Nausea with emesis  GERD  -Episodic complains of abdominal pain with nausea and emesis at times, abdominal exam is benign today  -Continue Zofran and PPI, " monitor GI symptoms    Lewy body dementia  Bipolar affective disorder  Depression  -Stable mental and functional status  -Continue donepezil, lamotrigine, mirtazapine, Seroquel, and sertraline,  monitor for changes in mood and behavior     Chronic kidney disease stage IV  -BMP on 11/2022 with GFR 15, creatinine 2.86, and BUN 46, patient noncompliant with blood draws  -Continue current care, BMP when patient allows, avoid nephrotoxic agents, monitor for changes in renal function    Anemia, chronic  -Patient noncompliant with lab draws  -Continue iron supplement, lab draws when patient permits     Screening for hypertension  -BP stable on no medications, periodic BMP, monitor BP      Electronically signed by:  Pinky Dawn DNP,ASCENCION,AGNP-BC.           The above note was completed in part using Dragon voice recognition software. Although reviewed after completion, some word and grammatical errors may occur. Please contact the author of this note with any questions.                 Sincerely,        ASCENCION Galicia CNP

## 2023-01-09 NOTE — PROGRESS NOTES
[facility] GERIATRICS  Chief Complaint   Patient presents with     Annual Comprehensive Nursing Home     [place] Medical Record Number:  [id number]  Place of Service where encounter took place:  [facility] () [[id number]]     HPI:    Maribel Sevilla  is a 89 year old  (12/20/1931), who is being seen today for an annual comprehensive visit. HPI information obtained from: facility chart records, facility staff, patient report and [facility] chart review.     Patient evaluated in the ER on 6/2/22 for transient unresponsiveness which is not unusual for her.  Today, she is in bed without acute distress.  States she is fine.  No reports of abdominal pain, nausea, vomiting, headache, dizziness, chest pain or shortness of breath.  Weight stable past 3 months.  Staff without acute concerns today.     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  IMMUNIZATIONS:  Immunization History   Administered Date(s) Administered     COVID-19 Vaccine 18+ (Moderna) 12/30/2020, 01/27/2021, 11/17/2021     FLUAD(HD)65+ QUAD 10/08/2021     Influenza (IIV3) PF 10/30/2014, 10/05/2015, 10/18/2016, 10/02/2017, 09/28/2018, 10/03/2019     Influenza Vaccine 65+ (Fluzone HD) 10/16/2020     Mantoux Tuberculin Skin Test 08/20/2011     Pneumo Conj 13-V (2010&after) 10/05/2015     Pneumococcal 23 valent 08/20/2011, 10/23/2013     TD (ADULT, 7+) 10/29/2011, 10/30/2011, 03/15/2017     Tdap (Adacel,Boostrix) 03/15/2017     Above immunizations pulled from [facility]. MIIC and facility records also reconciled. Outstanding  information sent to  to update [facility] .  Future immunizations are not needed at this point as all recommended immunizations are up to date.       Current Outpatient Medications:      ACE/ARB/ARNI NOT PRESCRIBED (INTENTIONAL), Please choose reason not prescribed from choices below., Disp: , Rfl:      acetaminophen (TYLENOL) 500 MG tablet, Take 500 mg by mouth 2 times daily and 1 tab every 4 hours as needed, Disp: , Rfl:      donepezil (ARICEPT) 5 MG tablet, Take 5 mg by mouth 2 times daily , Disp: 30 tablet, Rfl: 0     ferrous sulfate (FEROSUL) 325 (65 Fe) MG tablet, Take 1 tablet (325 mg) by mouth daily (with breakfast), Disp: , Rfl:      lamoTRIgine (LAMICTAL) 200 MG tablet, Take 1 tablet (200 mg) by mouth every morning, Disp: , Rfl:      mirtazapine (REMERON) 15 MG tablet, Take 1 tablet (15 mg) by mouth At Bedtime, Disp: , Rfl:      Multiple Vitamins-Minerals (PRESERVISION AREDS 2 PO), Take 1 tablet by mouth daily, Disp: , Rfl:      ondansetron (ZOFRAN) 4 MG tablet, Take 4 mg by mouth 4 times daily , Disp: , Rfl:      pantoprazole (PROTONIX) 40 MG EC tablet, Take 40 mg by mouth daily , Disp: , Rfl:      QUEtiapine (SEROQUEL) 50 MG tablet, Take 200 mg by mouth At Bedtime , Disp: , Rfl:      sertraline (ZOLOFT) 100 MG tablet, Take 1 tablet (100 mg) by mouth daily, Disp: , Rfl:      MED REC REQUIRED   Post Medication Reconciliation Status:  Patient was not discharged from an inpatient facility or TCU    Case Management:  I have reviewed the facility/SNF care plan/MDS, including the falls risk, nutrition and pain screening. I also reviewed the current immunizations, and preventive care.. Future cancer screening is not clinically indicated secondary to age/goals of care Patient's desire to return to the community is not assessible due to cognitive impairment. Current Level of Care is appropriate.    Advance Directive Discussion:    I reviewed the current advanced directives as reflected in EPIC,  "the POLST and the facility chart, and verified the congruency of orders . I contacted the first party  and discussed the plan of Care.  I did not due to cognitive impairment review the advance directives with the resident. Patient's goal is unobtainable secondary to cognitive impairment.    Team Discussion:  I communicated with the appropriate disciplines involved with the Plan of Care:   Nursing    Information reviewed:  Medications, vital signs, orders, and nursing notes.    ROS:  Limited secondary to cognitive impairment but today pt reports fine.     Vitals:  /72   Pulse 86   Temp 97.5  F (36.4  C)   Resp 18   Ht 1.575 m (5' 2\")   Wt 59 kg (130 lb)   SpO2 94%   BMI 23.78 kg/m   Body mass index is 23.78 kg/m .     Exam:  GENERAL APPEARANCE: NAD, in bed   HEENT-EARS: No discharge/drainage, [place]  HEENT-NECK: No lymphadenopathy  HEENT-EYES: PERRLA positive, no drainage/discharge  HEENT-NOSE/MOUTH/THROAT: No nasal drainage or erythema, mucous membranes dry   RESPIRATORY: Clear to auscultation, even and unlabored, symmetrical chest wall expansion  CARDIOVASCULAR: RRR, no peripheral edema, S1/S2 normal   GASTROINTESTINAL: Soft, nontender, nondistended, bowel sounds positive all quadrants  MUSCULOSKELETAL: In bed, gait not assessed   INTEGUMENTARY: Visualized areas intact  NEUROLOGICAL: Alert to self, memory loss, confusion, impaired balance  PSYCHOLOGICAL: Cooperative, calm     Lab/Diagnostic data:   Recent labs in [facility] reviewed by me today.     ASSESSMENT/PLAN:  Unresponsiveness episodes, unclear etiology  -Ongoing vague complains of not feeling well at baseline, exam benign today  -Monitor for changes in mental status    Abdominal pain  Nausea with emesis  GERD  -Episodic complains of abdominal pain with nausea and emesis at times, abdominal exam is benign today  -Continue Zofran and PPI, monitor GI symptoms    Lewy body dementia  Bipolar affective disorder  Depression  -Stable mental and functional " status  -Continue donepezil, lamotrigine, mirtazapine, Seroquel, and sertraline,  monitor for changes in mood and behavior     Chronic kidney disease stage IV  -BMP on 11/2022 with GFR 15, creatinine 2.86, and BUN 46, patient noncompliant with blood draws  -Continue current care, BMP when patient allows, avoid nephrotoxic agents, monitor for changes in renal function    Anemia, chronic  -Patient noncompliant with lab draws  -Continue iron supplement, lab draws when patient permits     Screening for hypertension  -BP stable on no medications, periodic BMP, monitor BP      Electronically signed by:  Pinky Dawn,HARRY,APRN,AGNP-BC.           The above note was completed in part using Dragon voice recognition software. Although reviewed after completion, some word and grammatical errors may occur. Please contact the author of this note with any questions.

## 2023-03-01 ENCOUNTER — NURSING HOME VISIT (OUTPATIENT)
Dept: GERIATRICS | Facility: CLINIC | Age: 88
End: 2023-03-01
Payer: COMMERCIAL

## 2023-03-01 VITALS
DIASTOLIC BLOOD PRESSURE: 69 MMHG | SYSTOLIC BLOOD PRESSURE: 105 MMHG | HEART RATE: 88 BPM | RESPIRATION RATE: 18 BRPM | BODY MASS INDEX: 23 KG/M2 | WEIGHT: 125 LBS | OXYGEN SATURATION: 93 % | HEIGHT: 62 IN | TEMPERATURE: 98.4 F

## 2023-03-01 DIAGNOSIS — F31.9 BIPOLAR AFFECTIVE DISORDER, REMISSION STATUS UNSPECIFIED (H): ICD-10-CM

## 2023-03-01 DIAGNOSIS — G31.83 LEWY BODY DEMENTIA WITHOUT BEHAVIORAL DISTURBANCE (H): Primary | ICD-10-CM

## 2023-03-01 DIAGNOSIS — F02.80 LEWY BODY DEMENTIA WITHOUT BEHAVIORAL DISTURBANCE (H): Primary | ICD-10-CM

## 2023-03-01 DIAGNOSIS — D64.9 ANEMIA, UNSPECIFIED TYPE: ICD-10-CM

## 2023-03-01 PROCEDURE — 99307 SBSQ NF CARE SF MDM 10: CPT | Performed by: INTERNAL MEDICINE

## 2023-03-01 NOTE — PROGRESS NOTES
[facility] GERIATRICS  Chief Complaint   [id number]  Place of Service where encounter took place:  [facility] ()     HPI:    Maribel Sevilla  is 89 year old (12/20/1931), who is being seen today for a federally mandated E/M visit.     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 If you have any questions, ask your nurse or doctor.            CONTINUE these medicines which have NOT CHANGED      Dose / Directions   ACE/ARB/ARNI NOT PRESCRIBED  Commonly known as: INTENTIONAL  Used for: CKD (chronic kidney disease) stage 4, GFR 15-29 ml/min (H)      Please choose reason not prescribed from choices below.  Refills: 0     acetaminophen 500 MG tablet  Commonly known as: TYLENOL      Dose: 500 mg  Take 500 mg by mouth 2 times daily and 1 tab every 4 hours as needed  Refills: 0     donepezil 5 MG tablet  Commonly known as: ARICEPT      Dose: 5 mg  Take 5 mg by mouth 2 times daily  Quantity: 30 tablet  Refills: 0     ferrous sulfate 325 (65 Fe) MG tablet  Commonly known as: FEROSUL  Used for: Iron deficiency anemia, unspecified iron deficiency anemia type      Dose: 325 mg  Take 1 tablet (325 mg) by mouth daily (with breakfast)  Refills: 0     lamoTRIgine 200 MG tablet  Commonly known as: LaMICtal  Used for: Lewy body dementia without behavioral disturbance (H)      Dose: 200 mg  Take 1 tablet (200 mg) by mouth every  "morning  Refills: 0     mirtazapine 15 MG tablet  Commonly known as: Remeron      Dose: 15 mg  Take 1 tablet (15 mg) by mouth At Bedtime  Refills: 0     ondansetron 4 MG tablet  Commonly known as: ZOFRAN      Dose: 4 mg  Take 4 mg by mouth 4 times daily  Refills: 0     pantoprazole 40 MG EC tablet  Commonly known as: PROTONIX      Dose: 40 mg  Take 40 mg by mouth daily  Refills: 0     PRESERVISION AREDS 2 PO      Dose: 1 tablet  Take 1 tablet by mouth daily  Refills: 0     QUEtiapine 50 MG tablet  Commonly known as: SEROquel      Dose: 200 mg  Take 200 mg by mouth At Bedtime  Refills: 0     sertraline 100 MG tablet  Commonly known as: Zoloft  Used for: Lewy body dementia without behavioral disturbance (H)      Dose: 100 mg  Take 1 tablet (100 mg) by mouth daily  Refills: 0             Vitals:  /69   Pulse 88   Temp 98.4  F (36.9  C)   Resp 18   Ht 1.575 m (5' 2\")   Wt 56.7 kg (125 lb)   SpO2 93%   BMI 22.86 kg/m    Body mass index is 22.86 kg/m .  Exam:  Sleepy, lying in bed, awakens to name  Oriented to self  Lungs clear  CV rrr  Abd soft, non-tender  No LE edema      ASSESSMENT    Michael Body dementia, stable mental and functional status    History of chronic intermittent abd pain, stable weight, symptoms    CKD stage 4, stable as of 11/2022    History of anemia, no recent evaluation    Bipolar disorder, stable on lamotrigine, quetiapine      Plan  Continue current tx  Routine lab monitoring, as Pt permits        "

## 2023-03-01 NOTE — LETTER
3/1/2023        RE: Maribel Sevilla  [facility]  [address] [facility] [address]        [facility] GERIATRICS  Chief Complaint   Patient presents with     long term Regulatory     [place] Medical Record Number:  [id number]  Place of Service where encounter took place:  [facility] ()     HPI:    Maribel Sevilla  is 89 year old (12/20/1931), who is being seen today for a federally mandated E/M visit.     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 If you have any questions, ask your nurse or doctor.            CONTINUE these medicines which have NOT CHANGED      Dose / Directions   ACE/ARB/ARNI NOT PRESCRIBED  Commonly known as: INTENTIONAL  Used for: CKD (chronic kidney disease) stage 4, GFR 15-29 ml/min (H)      Please choose reason not prescribed from choices below.  Refills: 0     acetaminophen 500 MG tablet  Commonly known as: TYLENOL      Dose: 500 mg  Take 500 mg by mouth 2 times daily and 1 tab every 4 hours as needed  Refills: 0     donepezil 5 MG tablet  Commonly known as: ARICEPT      Dose: 5 mg  Take 5 mg by mouth 2 times daily  Quantity: 30 tablet  Refills: 0     ferrous sulfate 325 (65 Fe) MG tablet  Commonly known as: FEROSUL  Used for: Iron deficiency anemia, unspecified iron deficiency anemia type      Dose: 325 mg  Take 1 tablet (325 mg) by mouth daily (with breakfast)  Refills: 0     lamoTRIgine 200 MG tablet  Commonly known as:  "LaMICtal  Used for: Lewy body dementia without behavioral disturbance (H)      Dose: 200 mg  Take 1 tablet (200 mg) by mouth every morning  Refills: 0     mirtazapine 15 MG tablet  Commonly known as: Remeron      Dose: 15 mg  Take 1 tablet (15 mg) by mouth At Bedtime  Refills: 0     ondansetron 4 MG tablet  Commonly known as: ZOFRAN      Dose: 4 mg  Take 4 mg by mouth 4 times daily  Refills: 0     pantoprazole 40 MG EC tablet  Commonly known as: PROTONIX      Dose: 40 mg  Take 40 mg by mouth daily  Refills: 0     PRESERVISION AREDS 2 PO      Dose: 1 tablet  Take 1 tablet by mouth daily  Refills: 0     QUEtiapine 50 MG tablet  Commonly known as: SEROquel      Dose: 200 mg  Take 200 mg by mouth At Bedtime  Refills: 0     sertraline 100 MG tablet  Commonly known as: Zoloft  Used for: Lewy body dementia without behavioral disturbance (H)      Dose: 100 mg  Take 1 tablet (100 mg) by mouth daily  Refills: 0             Vitals:  /69   Pulse 88   Temp 98.4  F (36.9  C)   Resp 18   Ht 1.575 m (5' 2\")   Wt 56.7 kg (125 lb)   SpO2 93%   BMI 22.86 kg/m    Body mass index is 22.86 kg/m .  Exam:  Sleepy, lying in bed, awakens to name  Oriented to self  Lungs clear  CV rrr  Abd soft, non-tender  No LE edema      ASSESSMENT    Michael Body dementia, stable mental and functional status    History of chronic intermittent abd pain, stable weight, symptoms    CKD stage 4, stable as of 11/2022    History of anemia, no recent evaluation    Bipolar disorder, stable on lamotrigine, quetiapine      Plan  Continue current tx  Routine lab monitoring, as Pt permits              Sincerely,        Arjun Arreguin MD      "

## 2023-03-15 ENCOUNTER — NURSING HOME VISIT (OUTPATIENT)
Dept: GERIATRICS | Facility: CLINIC | Age: 88
End: 2023-03-15
Payer: COMMERCIAL

## 2023-03-15 VITALS
DIASTOLIC BLOOD PRESSURE: 65 MMHG | BODY MASS INDEX: 22.86 KG/M2 | RESPIRATION RATE: 16 BRPM | SYSTOLIC BLOOD PRESSURE: 121 MMHG | HEART RATE: 87 BPM | HEIGHT: 62 IN | TEMPERATURE: 97.9 F | OXYGEN SATURATION: 93 % | WEIGHT: 124.2 LBS

## 2023-03-15 DIAGNOSIS — R41.89 UNRESPONSIVENESS: ICD-10-CM

## 2023-03-15 DIAGNOSIS — K59.01 SLOW TRANSIT CONSTIPATION: Primary | ICD-10-CM

## 2023-03-15 PROCEDURE — 99309 SBSQ NF CARE MODERATE MDM 30: CPT

## 2023-03-15 NOTE — PROGRESS NOTES
"[facility] GERIATRICS    Chief Complaint   Patient presents with     Nursing Home Acute     HPI:  Maribel Sevilla is a 89 year old  (12/20/1931), who is being seen today for an episodic care visit at: [facility] () [[id number]]Today's concern is: Constipation     Patient is being seen today for concerns with constipation.  Primary nurse reported hard stools and also patient is straining at stools.    Laying in bed without evidence of acute distress today.  She briefly opened her eyes and kept them closed throughout this visit.  She does very minimal conversation.  Patient with known episodes of decreased responsiveness without clear etiology.  She denies pain or discomfort today. Benign GI exam.  No reports of nausea, vomiting, abdominal pain, or diarrhea.     Allergies, and PMH/PSH reviewed in [facility] today.  REVIEW OF SYSTEMS:  Limited secondary to cognitive impairment but today pt reports fine.     Objective:   /65   Pulse 87   Temp 97.9  F (36.6  C)   Resp 16   Ht 1.575 m (5' 2\")   Wt 56.3 kg (124 lb 3.2 oz)   SpO2 93%   BMI 22.72 kg/m       Exam:  GENERAL APPEARANCE: Sleepy, eyes closed, no acute distress       RESPIRATORY: Clear to auscultation, even and unlabored, symmetrical chest wall expansion  CARDIOVASCULAR: RRR, no peripheral edema, S1/S2 normal   GASTROINTESTINAL: Soft, nontender, nondistended, bowel sounds positive all quadrants   NEUROLOGICAL: Alert to self, memory loss, confusion  PSYCHOLOGICAL: Uncooperative    Recent labs in [facility] reviewed by me today.     Assessment/Plan:  Constipation  -Reports of straining and hard stools  -Initiate MiraLAX daily, consider bulk forming laxatives if fails to respond to MiraLAX, monitor for worsening constipation    Unresponsiveness episodes, ongoing without clear etiology  -Ongoing vague complaints of not feeling well at baseline without clear etiology, stable mental and functional status since last visit  -Monitor for changes in cognition " status, monitor for safety    MED REC REQUIRED   Post Medication Reconciliation Status:  Patient was not discharged from an inpatient facility or TCU        Orders:  MiraLAX daily    Electronically signed by:    Pinky Dawn,HARRY,APRN,NILOP-BC.           The above note was completed in part using Dragon voice recognition software. Although reviewed after completion, some word and grammatical errors may occur. Please contact the author of this note with any questions.         aricept discontineu

## 2023-03-15 NOTE — LETTER
"    3/15/2023        RE: Maribel Sevilla  [facility]  [address] [facility] [address]        [facility] GERIATRICS    Chief Complaint   Patient presents with     Nursing Home Acute     HPI:  Maribel Sevilla is a 89 year old  (12/20/1931), who is being seen today for an episodic care visit at: [facility] () [[id number]]Today's concern is: Constipation     Patient is being seen today for concerns with constipation.  Primary nurse reported hard stools and also patient is straining at stools.    Laying in bed without evidence of acute distress today.  She briefly opened her eyes and kept them closed throughout this visit.  She does very minimal conversation.  Patient with known episodes of decreased responsiveness without clear etiology.  She denies pain or discomfort today. Benign GI exam.  No reports of nausea, vomiting, abdominal pain, or diarrhea.     Allergies, and PMH/PSH reviewed in EPIC today.  REVIEW OF SYSTEMS:  Limited secondary to cognitive impairment but today pt reports fine.     Objective:   /65   Pulse 87   Temp 97.9  F (36.6  C)   Resp 16   Ht 1.575 m (5' 2\")   Wt 56.3 kg (124 lb 3.2 oz)   SpO2 93%   BMI 22.72 kg/m       Exam:  GENERAL APPEARANCE: Sleepy, eyes closed, no acute distress       RESPIRATORY: Clear to auscultation, even and unlabored, symmetrical chest wall expansion  CARDIOVASCULAR: RRR, no peripheral edema, S1/S2 normal   GASTROINTESTINAL: Soft, nontender, nondistended, bowel sounds positive all quadrants   NEUROLOGICAL: Alert to self, memory loss, confusion  PSYCHOLOGICAL: Uncooperative    Recent labs in [facility] reviewed by me today.     Assessment/Plan:  Constipation  -Reports of straining and hard stools  -Initiate MiraLAX daily, consider bulk forming laxatives if fails to respond to MiraLAX, monitor for worsening constipation    Unresponsiveness episodes, ongoing without clear etiology  -Ongoing vague complaints of not feeling well " at baseline without clear etiology, stable mental and functional status since last visit  -Monitor for changes in cognition status, monitor for safety    MED REC REQUIRED   Post Medication Reconciliation Status:  Patient was not discharged from an inpatient facility or TCU        Orders:  MiraLAX daily    Electronically signed by:    Pinky Dawn DNP,ASCENCION,NILOP-BC.           The above note was completed in part using Dragon voice recognition software. Although reviewed after completion, some word and grammatical errors may occur. Please contact the author of this note with any questions.         aricept discontineu         Sincerely,        ASCENCION Galicia CNP

## 2023-03-22 ENCOUNTER — TELEPHONE (OUTPATIENT)
Dept: GERIATRICS | Facility: CLINIC | Age: 88
End: 2023-03-22
Payer: COMMERCIAL

## 2023-03-22 RX ORDER — POLYETHYLENE GLYCOL 3350 17 G/17G
1 POWDER, FOR SOLUTION ORAL DAILY
COMMUNITY

## 2023-03-22 RX ORDER — AMOXICILLIN 250 MG
1 CAPSULE ORAL DAILY
COMMUNITY

## 2023-03-22 NOTE — TELEPHONE ENCOUNTER
[facility] Geriatrics Triage Nurse Telephone Encounter    Provider: ASCENCION Rios CNP  Facility: [facility] Facility Type:  LTC    Caller: Neno  Call Back Number:     Allergies:    Allergies   Allergen Reactions     No Known Allergies         Reason for call: Nurse called to report that patient's stools are still hard even though patient's receives Miralax daily.  Nurse is wondering if Senna can be ordered.      Verbal Order/Direction given by Provider: Start Senna-S 1 tablet po daily and Senna-S 2 tablets po BID PRN per HAY.     Provider giving Order:  ASCENCION Rios CNP    Verbal Order given to: Neno Yeboah, RN

## 2023-03-24 ENCOUNTER — NURSING HOME VISIT (OUTPATIENT)
Dept: GERIATRICS | Facility: CLINIC | Age: 88
End: 2023-03-24
Payer: COMMERCIAL

## 2023-03-24 VITALS
RESPIRATION RATE: 18 BRPM | SYSTOLIC BLOOD PRESSURE: 116 MMHG | DIASTOLIC BLOOD PRESSURE: 66 MMHG | WEIGHT: 124.2 LBS | BODY MASS INDEX: 22.86 KG/M2 | TEMPERATURE: 98.1 F | HEIGHT: 62 IN | OXYGEN SATURATION: 93 % | HEART RATE: 86 BPM

## 2023-03-24 DIAGNOSIS — K59.01 SLOW TRANSIT CONSTIPATION: Primary | ICD-10-CM

## 2023-03-24 PROCEDURE — 99309 SBSQ NF CARE MODERATE MDM 30: CPT

## 2023-03-24 NOTE — LETTER
"    3/24/2023        RE: Maribel Sevilla  [facility]  [address] [facility] [address]        [facility]    Chief Complaint   Patient presents with     RECHECK     Constipation follow up     HPI:  Maribel Sevilla is a 89 year old  (12/20/1931), who is being seen today for an episodic care visit at: [facility] () [[id number]]. Today's concern is: Constipation    Patient was started on MiraLAX on 3/15 for reports of constipation.  On 3/22, nursing staff reported patient's stool was still hard despite daily MiraLAX.  Senna 1 tab daily and 2 tabs BID PRN added to her regimen.        Patient is being seen today for a follow-up visit.  History limited.  Laying in bed without acute distress. GI exam benign.  No reports of nausea, vomiting, abdominal pain or diarrhea.  Will add Metamucil to her current regimen.    Allergies, and PMH/PSH reviewed in EPIC today.  REVIEW OF SYSTEMS:  Limited secondary to cognitive impairment but today pt reports ok.     Objective:   /66   Pulse 86   Temp 98.1  F (36.7  C)   Resp 18   Ht 1.575 m (5' 2\")   Wt 56.3 kg (124 lb 3.2 oz)   SpO2 93%   BMI 22.72 kg/m       Exam:  GENERAL APPEARANCE: In bed, appears sleepy, eyes closed, no acute distress       RESPIRATORY: Clear to auscultation, even and unlabored, symmetrical chest wall expansion  CARDIOVASCULAR: RRR, no peripheral edema, S1/S2 normal   GASTROINTESTINAL: Soft, nontender, nondistended, bowel sounds positive all quadrants   PSYCHOLOGICAL: Uncooperative    Recent labs in EPIC reviewed by me today.     Assessment/Plan:  Constipation  -Reports of straining and hard stools  -Start Metamucil, continue daily and PRN senna, continue daily MiraLAX, monitor stool consistency       MED REC REQUIRED Post Medication Reconciliation Status:  Patient was not discharged from an inpatient facility or TCU        Orders:  Metamucil     Electronically signed by:   Pinky " HARRY Dawn,ASCENCION,AGNP-BC.           The above note was completed in part using Dragon voice recognition software. Although reviewed after completion, some word and grammatical errors may occur. Please contact the author of this note with any questions.               Sincerely,        ASCENCION Galicia CNP

## 2023-03-24 NOTE — PROGRESS NOTES
"[facility] GERIATRICS    Chief Complaint   Patient presents with     RECHECK     Constipation follow up     HPI:  Maribel Sevilla is a 89 year old  (12/20/1931), who is being seen today for an episodic care visit at: [facility] () [[id number]]. Today's concern is: Constipation    Patient was started on MiraLAX on 3/15 for reports of constipation.  On 3/22, nursing staff reported patient's stool was still hard despite daily MiraLAX.  Senna 1 tab daily and 2 tabs BID PRN added to her regimen.        Patient is being seen today for a follow-up visit.  History limited.  Laying in bed without acute distress. GI exam benign.  No reports of nausea, vomiting, abdominal pain or diarrhea.  Will add Metamucil to her current regimen.    Allergies, and PMH/PSH reviewed in [facility] today.  REVIEW OF SYSTEMS:  Limited secondary to cognitive impairment but today pt reports ok.     Objective:   /66   Pulse 86   Temp 98.1  F (36.7  C)   Resp 18   Ht 1.575 m (5' 2\")   Wt 56.3 kg (124 lb 3.2 oz)   SpO2 93%   BMI 22.72 kg/m       Exam:  GENERAL APPEARANCE: In bed, appears sleepy, eyes closed, no acute distress       RESPIRATORY: Clear to auscultation, even and unlabored, symmetrical chest wall expansion  CARDIOVASCULAR: RRR, no peripheral edema, S1/S2 normal   GASTROINTESTINAL: Soft, nontender, nondistended, bowel sounds positive all quadrants   PSYCHOLOGICAL: Uncooperative    Recent labs in [facility] reviewed by me today.     Assessment/Plan:  Constipation   -Reports of straining and hard stools  -Start Metamucil, continue daily and PRN senna, continue daily MiraLAX, monitor stool consistency    Lewy body dementia  Bipolar disorder  Depression  -Stable mental and functional status  -Continue donepezil, lamotrigine, mirtazapine, seroquel, and sertraline,  monitor for changes in mood, behavior, and functional status       MED REC REQUIRED Post Medication Reconciliation Status:  Patient was not discharged from an " inpatient facility or TCU        Orders:  Metamucil     Electronically signed by:   Pinky Dawn,HARRY,APRN,SONY.           The above note was completed in part using Dragon voice recognition software. Although reviewed after completion, some word and grammatical errors may occur. Please contact the author of this note with any questions.

## 2023-03-30 ENCOUNTER — NURSING HOME VISIT (OUTPATIENT)
Dept: GERIATRICS | Facility: CLINIC | Age: 88
End: 2023-03-30
Payer: COMMERCIAL

## 2023-03-30 VITALS
DIASTOLIC BLOOD PRESSURE: 71 MMHG | BODY MASS INDEX: 22.86 KG/M2 | HEART RATE: 78 BPM | RESPIRATION RATE: 17 BRPM | OXYGEN SATURATION: 94 % | WEIGHT: 124.2 LBS | HEIGHT: 62 IN | SYSTOLIC BLOOD PRESSURE: 108 MMHG | TEMPERATURE: 98.3 F

## 2023-03-30 DIAGNOSIS — K59.01 SLOW TRANSIT CONSTIPATION: Primary | ICD-10-CM

## 2023-03-30 PROCEDURE — 99308 SBSQ NF CARE LOW MDM 20: CPT

## 2023-03-30 NOTE — LETTER
"    3/30/2023        RE: Maribel Sevilla  [facility]  [address] [facility] [address]        [facility]    Chief Complaint   Patient presents with     RECHECK     constipation     HPI:  Maribel Sevilla is a 89 year old  (12/20/1931), who is being seen today for an episodic care visit at: [facility] () [[id number]]. Today's concern is: Constipation follow-up    Patient initiated on MiraLAX daily on 3/15, senna 1 tab daily and 2 tabs twice daily as needed on 3/22, and Metamucil on 3/24 for constipation.    She is being seen today for a follow-up visit.  Bowels are moving every 1-2 days in average since started on medications. Patient denies any physical concerns.  No reports of abdominal pain, nausea or vomiting.  GI exam benign.    Allergies, and PMH/PSH reviewed in [facility] today.  REVIEW OF SYSTEMS:  Limited secondary to cognitive impairment but today pt reports fine.     Objective:   /71   Pulse 78   Temp 98.3  F (36.8  C)   Resp 17   Ht 1.575 m (5' 2\")   Wt 56.3 kg (124 lb 3.2 oz)   SpO2 94%   BMI 22.72 kg/m       Exam:  GENERAL APPEARANCE: In bed, in no distress  RESPIRATORY: Clear to auscultation, even and unlabored, symmetrical chest wall expansion  CARDIOVASCULAR: RRR, no peripheral edema, S1/S2 normal   GASTROINTESTINAL: Soft, nontender, nondistended, bowel sounds positive all quadrants        Recent labs in [facility] reviewed by me today.     Assessment/Plan:  Constipation   -Continue current care, monitor bowel function     MED REC REQUIRED{ Post Medication Reconciliation Status:  Patient was not discharged from an inpatient facility or TCU        Electronically signed by:  Pinky Dawn,HARRY,APRN,AGNP-BC.             The above note was completed in part using Dragon voice recognition software. Although reviewed after completion, some word and grammatical errors may occur. Please contact the author of this note with any questions. "               Sincerely,        ASCENCION Galicia CNP

## 2023-03-30 NOTE — PROGRESS NOTES
"[facility] GERIATRICS    Chief Complaint   Patient presents with     RECHECK     constipation     HPI:  Maribel Sevilla is a 89 year old  (12/20/1931), who is being seen today for an episodic care visit at: [facility] () [[id number]]. Today's concern is: Constipation follow-up    Patient initiated on MiraLAX daily on 3/15, senna 1 tab daily and 2 tabs twice daily as needed on 3/22, and Metamucil on 3/24 for constipation.    She is being seen today for a follow-up visit.  Bowels are moving every 1-2 days in average since started on medications. Patient denies any physical concerns.  No reports of abdominal pain, nausea or vomiting.  GI exam benign.    Allergies, and PMH/PSH reviewed in [facility] today.  REVIEW OF SYSTEMS:  Limited secondary to cognitive impairment but today pt reports fine.     Objective:   /71   Pulse 78   Temp 98.3  F (36.8  C)   Resp 17   Ht 1.575 m (5' 2\")   Wt 56.3 kg (124 lb 3.2 oz)   SpO2 94%   BMI 22.72 kg/m       Exam:  GENERAL APPEARANCE: In bed, in no distress  RESPIRATORY: Clear to auscultation, even and unlabored, symmetrical chest wall expansion  CARDIOVASCULAR: RRR, no peripheral edema, S1/S2 normal   GASTROINTESTINAL: Soft, nontender, nondistended, bowel sounds positive all quadrants        Recent labs in [facility] reviewed by me today.     Assessment/Plan:  Constipation   -Continue current care, monitor bowel function     MED REC REQUIRED{ Post Medication Reconciliation Status:  Patient was not discharged from an inpatient facility or TCU        Electronically signed by:  Pinky Dawn,HARRY,APRN,AGNP-BC.             The above note was completed in part using Dragon voice recognition software. Although reviewed after completion, some word and grammatical errors may occur. Please contact the author of this note with any questions.         "

## 2023-04-11 ENCOUNTER — PATIENT OUTREACH (OUTPATIENT)
Dept: GERIATRIC MEDICINE | Facility: CLINIC | Age: 88
End: 2023-04-11
Payer: COMMERCIAL

## 2023-04-11 NOTE — PROGRESS NOTES
[facility] Six-Month Assessment    6 month assessment completed on 4-11-23 with LAWSON Goncalves.    ER visits: No  Hospitalizations: No  TCU stays: No  Significant health status changes: no, continued constipation but withj new meds is going every one to two days.  No complains.  Falls/Injuries: No  ADL/IADL changes: No    Reviewed Institutional Assessment and updated as needed.     Will see member in 6 months for an annual health risk assessment.   Encouraged member to call CC with any questions or concerns in the meantime.     Veronica Barton RN  [facility]  [phone]

## 2023-04-12 ENCOUNTER — NURSING HOME VISIT (OUTPATIENT)
Dept: GERIATRICS | Facility: CLINIC | Age: 88
End: 2023-04-12
Payer: COMMERCIAL

## 2023-04-12 VITALS
SYSTOLIC BLOOD PRESSURE: 118 MMHG | HEIGHT: 62 IN | OXYGEN SATURATION: 93 % | DIASTOLIC BLOOD PRESSURE: 75 MMHG | TEMPERATURE: 97.7 F | BODY MASS INDEX: 22.82 KG/M2 | RESPIRATION RATE: 18 BRPM | WEIGHT: 124 LBS | HEART RATE: 74 BPM

## 2023-04-12 DIAGNOSIS — K59.01 SLOW TRANSIT CONSTIPATION: Primary | ICD-10-CM
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
## 2023-04-12 PROCEDURE — 99309 SBSQ NF CARE MODERATE MDM 30: CPT

## 2023-04-12 NOTE — LETTER
"    4/12/2023        RE: Maribel Sevilla  [facility]  [address] [facility] [address]        [facility] GERIATRICS    Chief Complaint   Patient presents with     RECHECK     constipation     HPI:  Maribel Sevilla is a 89 year old  (12/20/1931), who is being seen today for an episodic care visit at: [facility] () [[id number]]. Today's concern is: Constipation and fall follow-ups    Patient recently initiated on MiraLAX, senna, and Metamucil at different visits for constipation, improved. Had bowel movements daily last 3 days.  She denies GI symptoms.  Exam is benign.  Appetite at baseline for patient.    Patient had a fall yesterday on 4/11 per staff report.  She was being transferred by family when the fall happened. No injuries noted or reported.  BP stable. No reports of headache, dizziness, chest pain or shortness of breath.    Allergies, and PMH/PSH reviewed in EPIC today.  REVIEW OF SYSTEMS:  Limited secondary to cognitive impairment but today pt reports fine.    Objective:   /75   Pulse 74   Temp 97.7  F (36.5  C)   Resp 18   Ht 1.575 m (5' 2\")   Wt 56.2 kg (124 lb)   SpO2 93%   BMI 22.68 kg/m       Exam:  GENERAL APPEARANCE: In bed in no distress, awake  RESPIRATORY: Clear to auscultation, even and unlabored, symmetrical chest wall expansion  CARDIOVASCULAR: RRR, no peripheral edema, S1/S2 normal   GASTROINTESTINAL: Soft, nontender, nondistended, bowel sounds active  PSYCH: Calm     Labs in facility chart reviewed by me today    Assessment/Plan:  Constipation, improved  -Continue current care, monitor for change in bowel habits  Fall  -No injuries, PT eval and treat as needed, fall precautions      MED REC REQUIRED  Post Medication Reconciliation Status:  Patient was not discharged from an inpatient facility or TCU           Electronically signed by:   Pinky Dawn,HARRY,APRN,AGNP-BC.             The above note was completed in part using Dragon voice " recognition software. Although reviewed after completion, some word and grammatical errors may occur. Please contact the author of this note with any questions.               Sincerely,        ASCENCION Galicia CNP

## 2023-04-12 NOTE — PROGRESS NOTES
"[facility] GERIATRICS    Chief Complaint   Patient presents with     RECHECK     constipation     HPI:  Maribel Sevilla is a 89 year old  (12/20/1931), who is being seen today for an episodic care visit at: [facility] () [[id number]]. Today's concern is: Constipation and fall follow-ups    Patient recently initiated on MiraLAX, senna, and Metamucil at different visits for constipation, improved. Had bowel movements daily last 3 days.  She denies GI symptoms.  Exam is benign.  Appetite at baseline for patient.    Patient had a fall yesterday on 4/11 per staff report.  She was being transferred by family when the fall happened. No injuries noted or reported.  BP stable. No reports of headache, dizziness, chest pain or shortness of breath.    Allergies, and PMH/PSH reviewed in [facility] today.  REVIEW OF SYSTEMS:  Limited secondary to cognitive impairment but today pt reports fine.    Objective:   /75   Pulse 74   Temp 97.7  F (36.5  C)   Resp 18   Ht 1.575 m (5' 2\")   Wt 56.2 kg (124 lb)   SpO2 93%   BMI 22.68 kg/m       Exam:  GENERAL APPEARANCE: In bed in no distress, awake  RESPIRATORY: Clear to auscultation, even and unlabored, symmetrical chest wall expansion  CARDIOVASCULAR: RRR, no peripheral edema, S1/S2 normal   GASTROINTESTINAL: Soft, nontender, nondistended, bowel sounds active  PSYCH: Calm     Labs in facility chart reviewed by me today    Assessment/Plan:  Constipation, improved  -Continue current care, monitor for change in bowel habits  Fall  -No injuries, PT eval and treat as needed, fall precautions      MED REC REQUIRED  Post Medication Reconciliation Status:  Patient was not discharged from an inpatient facility or TCU           Electronically signed by:   Pinky Dawn,DNP,APRN,AGNP-BC.             The above note was completed in part using Dragon voice recognition software. Although reviewed after completion, some word and grammatical errors may occur. Please contact the author " of this note with any questions.

## 2023-04-19 ENCOUNTER — NURSING HOME VISIT (OUTPATIENT)
Dept: GERIATRICS | Facility: CLINIC | Age: 88
End: 2023-04-19
Payer: COMMERCIAL

## 2023-04-19 VITALS
SYSTOLIC BLOOD PRESSURE: 124 MMHG | OXYGEN SATURATION: 93 % | BODY MASS INDEX: 23.92 KG/M2 | RESPIRATION RATE: 16 BRPM | HEART RATE: 79 BPM | WEIGHT: 130 LBS | TEMPERATURE: 97.9 F | DIASTOLIC BLOOD PRESSURE: 77 MMHG | HEIGHT: 62 IN

## 2023-04-19 DIAGNOSIS — F33.2 SEVERE EPISODE OF RECURRENT MAJOR DEPRESSIVE DISORDER, WITHOUT PSYCHOTIC FEATURES (H): ICD-10-CM

## 2023-04-19 DIAGNOSIS — G31.83 LEWY BODY DEMENTIA WITHOUT BEHAVIORAL DISTURBANCE (H): ICD-10-CM

## 2023-04-19 DIAGNOSIS — F31.9 BIPOLAR AFFECTIVE DISORDER, REMISSION STATUS UNSPECIFIED (H): ICD-10-CM

## 2023-04-19 DIAGNOSIS — K59.01 SLOW TRANSIT CONSTIPATION: Primary | ICD-10-CM

## 2023-04-19 DIAGNOSIS — F02.80 LEWY BODY DEMENTIA WITHOUT BEHAVIORAL DISTURBANCE (H): ICD-10-CM
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
## 2023-04-19 PROCEDURE — 99309 SBSQ NF CARE MODERATE MDM 30: CPT

## 2023-04-19 NOTE — LETTER
"    4/19/2023        RE: Maribel Sevilla  [facility]  [address] [facility] [address]        [facility] GERIATRICS    Chief Complaint   Patient presents with     RECHECK     HPI:  Maribel Sevilla is a 89 year old  (12/20/1931), who is being seen today for an episodic care visit at: [facility] () [[id number]]. Today's concern is: Constipation follow up     Had recently started on bowel medications for constipation. Bowels are moving every one to two days since. Benign GI exam. Staff has not acute concerns.     Aricept recently discontinued on 3/15. Behaviors at baseline for patient.      She in bed. NAD. Not answering exam questions today, not new for patient. Hemodynamically stable.        Allergies, and PMH/PSH reviewed in EPIC today.  REVIEW OF SYSTEMS:  Unobtainable secondary to noncompliance.     Objective:   /77   Pulse 79   Temp 97.9  F (36.6  C)   Resp 16   Ht 1.575 m (5' 2\")   Wt 59 kg (130 lb)   SpO2 93%   BMI 23.78 kg/m      Exam:  GENERAL APPEARANCE: In bed in no distress, eyes closed   RESPIRATORY: Clear to auscultation, even and unlabored, symmetrical chest wall expansion  CARDIOVASCULAR: RRR, no peripheral edema, S1/S2 normal   GASTROINTESTINAL: Soft, nontender, nondistended, bowel sounds active  PSYCH: Eyes closed     Labs in facility chart reviewed by me today.     Assessment/Plan:  Constipation, improved  -Continue current care, monitor for change in bowel habits    Fall  -Recent fall with no injuries, PT eval and treat as pt allows, fall precautions    Lewy body dementia  Bipolar affective disorder  Depression  -Stable mental and functional status, followed by ACP , aricept discounted on 3/15  -Continue lamotrigine, mirtazapine, Seroquel, and sertraline,  monitor for changes in mood and behavior    MED REC REQUIRED  Post Medication Reconciliation Status:  Patient was not discharged from an inpatient facility or " TCU        Electronically signed by:   Pinky Dawn DNP,ASCENCION,NIAK-BC.                   Sincerely,        ASCENCION Galicia CNP

## 2023-04-19 NOTE — PROGRESS NOTES
"[facility] GERIATRICS    Chief Complaint   Patient presents with     RECHECK     HPI:  Maribel Sevilla is a 89 year old  (12/20/1931), who is being seen today for an episodic care visit at: [facility] () [[id number]]. Today's concern is: Constipation follow up     Had recently started on bowel medications for constipation. Bowels are moving every one to two days since. Benign GI exam. Staff has not acute concerns.     Aricept recently discontinued on 3/15. Behaviors at baseline for patient.      She in bed. NAD. Not answering exam questions today, not new for patient. Hemodynamically stable.        Allergies, and PMH/PSH reviewed in EPIC today.  REVIEW OF SYSTEMS:  Unobtainable secondary to noncompliance.     Objective:   /77   Pulse 79   Temp 97.9  F (36.6  C)   Resp 16   Ht 1.575 m (5' 2\")   Wt 59 kg (130 lb)   SpO2 93%   BMI 23.78 kg/m      Exam:  GENERAL APPEARANCE: In bed in no distress, eyes closed   RESPIRATORY: Clear to auscultation, even and unlabored, symmetrical chest wall expansion  CARDIOVASCULAR: RRR, no peripheral edema, S1/S2 normal   GASTROINTESTINAL: Soft, nontender, nondistended, bowel sounds active  PSYCH: Eyes closed     Labs in facility chart reviewed by me today.     Assessment/Plan:  Constipation, improved  -Continue current care, monitor for change in bowel habits    Fall  -Recent fall with no injuries, PT eval and treat as pt allows, fall precautions    Lewy body dementia  Bipolar affective disorder  Depression  -Stable mental and functional status, followed by ACP , aricept discounted on 3/15  -Continue lamotrigine, mirtazapine, Seroquel, and sertraline,  monitor for changes in mood and behavior    MED REC REQUIRED  Post Medication Reconciliation Status:  Patient was not discharged from an inpatient facility or TCU        Electronically signed by:   Pinky Dawn,DNP,APRN,AGNP-BC.             "

## 2023-05-04 ENCOUNTER — NURSING HOME VISIT (OUTPATIENT)
Dept: GERIATRICS | Facility: CLINIC | Age: 88
End: 2023-05-04
Payer: COMMERCIAL

## 2023-05-04 VITALS
HEART RATE: 82 BPM | OXYGEN SATURATION: 92 % | HEIGHT: 62 IN | SYSTOLIC BLOOD PRESSURE: 135 MMHG | WEIGHT: 129.8 LBS | RESPIRATION RATE: 17 BRPM | TEMPERATURE: 98.3 F | DIASTOLIC BLOOD PRESSURE: 82 MMHG | BODY MASS INDEX: 23.89 KG/M2

## 2023-05-04 DIAGNOSIS — K59.01 SLOW TRANSIT CONSTIPATION: ICD-10-CM

## 2023-05-04 DIAGNOSIS — F31.9 BIPOLAR AFFECTIVE DISORDER, REMISSION STATUS UNSPECIFIED (H): ICD-10-CM

## 2023-05-04 DIAGNOSIS — F33.2 SEVERE EPISODE OF RECURRENT MAJOR DEPRESSIVE DISORDER, WITHOUT PSYCHOTIC FEATURES (H): ICD-10-CM

## 2023-05-04 DIAGNOSIS — G31.83 LEWY BODY DEMENTIA WITHOUT BEHAVIORAL DISTURBANCE (H): Primary | ICD-10-CM

## 2023-05-04 DIAGNOSIS — F02.80 LEWY BODY DEMENTIA WITHOUT BEHAVIORAL DISTURBANCE (H): Primary | ICD-10-CM

## 2023-05-04 PROCEDURE — 99309 SBSQ NF CARE MODERATE MDM 30: CPT

## 2023-05-04 NOTE — PROGRESS NOTES
[facility] GERIATRICS  Chief Complaint   Patient presents with     snf Regulatory     [place] Medical Record Number:  [id number]  Place of Service where encounter took place:  [facility] () [[id number]]    HPI:    Maribel Sevilla  is 89 year old (12/20/1931), who is being seen today for a federally mandated E/M visit. Today's concerns are: LTC f/u     Seen and examined in bed.  Awake and engaging this morning.  History limited.  Requesting for a bowl of ice cream.  Recently started on bowel medications. Bowels are moving fine.  No reports of abdominal pain. Hemodynamically stable.  Staff has no acute concerns.    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  FAMILY HISTORY: family history includes C.A.D. in her brother, father, mother, and sister; Psychotic Disorder in her son.  SOCIAL HISTORY:  reports that she has never smoked. She has never used smokeless tobacco. She reports that she does not drink alcohol and does not use drugs.    MEDICATIONS:  MED REC REQUIRED  Post Medication Reconciliation Status:  Patient was not discharged from an inpatient facility or TCU        Review of your medicines          Accurate as of May 4, 2023 12:16 PM. If you have any questions, ask your nurse or doctor.            CONTINUE these medicines which have NOT CHANGED      Dose / Directions   ACE/ARB/ARNI NOT PRESCRIBED  Commonly known as: INTENTIONAL  Used for: CKD (chronic kidney disease) stage 4, GFR 15-29 ml/min (H)      Please choose reason not prescribed from choices below.  Refills: 0      acetaminophen 500 MG tablet  Commonly known as: TYLENOL      Dose: 500 mg  Take 500 mg by mouth 2 times daily and 1 tab every 4 hours as needed  Refills: 0     donepezil 5 MG tablet  Commonly known as: ARICEPT      Dose: 5 mg  Take 5 mg by mouth 2 times daily  Quantity: 30 tablet  Refills: 0     ferrous sulfate 325 (65 Fe) MG tablet  Commonly known as: FEROSUL  Used for: Iron deficiency anemia, unspecified iron deficiency anemia type      Dose: 325 mg  Take 1 tablet (325 mg) by mouth daily (with breakfast)  Refills: 0     lamoTRIgine 200 MG tablet  Commonly known as: LaMICtal  Used for: Lewy body dementia without behavioral disturbance (H)      Dose: 200 mg  Take 1 tablet (200 mg) by mouth every morning  Refills: 0     mirtazapine 15 MG tablet  Commonly known as: Remeron      Dose: 15 mg  Take 1 tablet (15 mg) by mouth At Bedtime  Refills: 0     ondansetron 4 MG tablet  Commonly known as: ZOFRAN      Dose: 4 mg  Take 4 mg by mouth 4 times daily  Refills: 0     pantoprazole 40 MG EC tablet  Commonly known as: PROTONIX      Dose: 40 mg  Take 40 mg by mouth daily  Refills: 0     polyethylene glycol 17 g packet  Commonly known as: MIRALAX      Dose: 1 packet  Take 1 packet by mouth daily  Refills: 0     PRESERVISION AREDS 2 PO      Dose: 1 tablet  Take 1 tablet by mouth daily  Refills: 0     QUEtiapine 50 MG tablet  Commonly known as: SEROquel      Dose: 200 mg  Take 200 mg by mouth At Bedtime  Refills: 0     senna-docusate 8.6-50 MG tablet  Commonly known as: SENOKOT-S/PERICOLACE      Dose: 1 tablet  Take 1 tablet by mouth daily May also have 2 tablets po BID PRN  Refills: 0     sertraline 100 MG tablet  Commonly known as: Zoloft  Used for: Lewy body dementia without behavioral disturbance (H)      Dose: 100 mg  Take 1 tablet (100 mg) by mouth daily  Refills: 0           Case Management:  I have reviewed the care plan and MDS and do agree with the plan. Patient's desire to return to the community is not assessible  "due to cognitive impairment. Information reviewed:  Medications, vital signs, orders, and nursing notes.    ROS:  Limited secondary to cognitive impairment but today pt reports fine.     Vitals:  /82   Pulse 82   Temp 98.3  F (36.8  C)   Resp 17   Ht 1.575 m (5' 2\")   Wt 58.9 kg (129 lb 12.8 oz)   SpO2 92%   BMI 23.74 kg/m    Body mass index is 23.74 kg/m .    Exam:  GENERAL APPEARANCE: NAD, in bed    RESPIRATORY: Clear to auscultation, even and unlabored, symmetrical chest wall expansion  CARDIOVASCULAR: RRR, no peripheral edema, S1/S2 normal   GASTROINTESTINAL: Soft, nontender, nondistended, bowel sounds positive  MUSCULOSKELETAL: In bed, gait not assessed   NEUROLOGICAL: Alert to self, memory loss, confusion  PSYCHOLOGICAL: Cooperative, calm     Lab/Diagnostic data:   Recent labs in [facility] reviewed by me today.     ASSESSMENT/PLAN  Lewy body dementia  Bipolar disorder  Depression  -Stable mental and functional status, taken off Aricept in March by ACP   -Continue lamotrigine, mirtazapine, seroquel, and sertraline,  monitor for changes in mood, behavior, and functional status, ACP f/u     Constipation   -Stable   -Continue current care, monitor bowel function           Electronically signed by:  Pinky Dawn,HARRY,APRN,AGNP-BC.               The above note was completed in part using Dragon voice recognition software. Although reviewed after completion, some word and grammatical errors may occur. Please contact the author of this note with any questions.           "

## 2023-05-04 NOTE — LETTER
5/4/2023        RE: Maribel Sevilla  [facility]  [address] [facility] [address]        [facility] GERIATRICS  Chief Complaint   Patient presents with     long term Regulatory     [place] Medical Record Number:  [id number]  Place of Service where encounter took place:  [facility] () [[id number]]    HPI:    Maribel Sevilla  is 89 year old (12/20/1931), who is being seen today for a federally mandated E/M visit. Today's concerns are: LTC f/u     Seen and examined in bed.  Awake and engaging this morning.  History limited.  Requesting for a bowl of ice cream.  Recently started on bowel medications. Bowels are moving fine.  No reports of abdominal pain. Hemodynamically stable.  Staff has no acute concerns.    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  FAMILY HISTORY: family history includes C.A.D. in her brother, father, mother, and sister; Psychotic Disorder in her son.  SOCIAL HISTORY:  reports that she has never smoked. She has never used smokeless tobacco. She reports that she does not drink alcohol and does not use drugs.    MEDICATIONS:  MED REC REQUIRED  Post Medication Reconciliation Status:  Patient was not discharged from an inpatient facility or TCU        Review of your medicines          Accurate as of May 4, 2023 12:16 PM. If you have any questions, ask your nurse or doctor.            CONTINUE these medicines which have NOT CHANGED      Dose / Directions   ACE/ARB/ARNI NOT PRESCRIBED  Commonly known as: INTENTIONAL  Used for: CKD (chronic  kidney disease) stage 4, GFR 15-29 ml/min (H)      Please choose reason not prescribed from choices below.  Refills: 0     acetaminophen 500 MG tablet  Commonly known as: TYLENOL      Dose: 500 mg  Take 500 mg by mouth 2 times daily and 1 tab every 4 hours as needed  Refills: 0     donepezil 5 MG tablet  Commonly known as: ARICEPT      Dose: 5 mg  Take 5 mg by mouth 2 times daily  Quantity: 30 tablet  Refills: 0     ferrous sulfate 325 (65 Fe) MG tablet  Commonly known as: FEROSUL  Used for: Iron deficiency anemia, unspecified iron deficiency anemia type      Dose: 325 mg  Take 1 tablet (325 mg) by mouth daily (with breakfast)  Refills: 0     lamoTRIgine 200 MG tablet  Commonly known as: LaMICtal  Used for: Lewy body dementia without behavioral disturbance (H)      Dose: 200 mg  Take 1 tablet (200 mg) by mouth every morning  Refills: 0     mirtazapine 15 MG tablet  Commonly known as: Remeron      Dose: 15 mg  Take 1 tablet (15 mg) by mouth At Bedtime  Refills: 0     ondansetron 4 MG tablet  Commonly known as: ZOFRAN      Dose: 4 mg  Take 4 mg by mouth 4 times daily  Refills: 0     pantoprazole 40 MG EC tablet  Commonly known as: PROTONIX      Dose: 40 mg  Take 40 mg by mouth daily  Refills: 0     polyethylene glycol 17 g packet  Commonly known as: MIRALAX      Dose: 1 packet  Take 1 packet by mouth daily  Refills: 0     PRESERVISION AREDS 2 PO      Dose: 1 tablet  Take 1 tablet by mouth daily  Refills: 0     QUEtiapine 50 MG tablet  Commonly known as: SEROquel      Dose: 200 mg  Take 200 mg by mouth At Bedtime  Refills: 0     senna-docusate 8.6-50 MG tablet  Commonly known as: SENOKOT-S/PERICOLACE      Dose: 1 tablet  Take 1 tablet by mouth daily May also have 2 tablets po BID PRN  Refills: 0     sertraline 100 MG tablet  Commonly known as: Zoloft  Used for: Lewy body dementia without behavioral disturbance (H)      Dose: 100 mg  Take 1 tablet (100 mg) by mouth daily  Refills: 0           Case Management:  I have  "reviewed the care plan and MDS and do agree with the plan. Patient's desire to return to the community is not assessible due to cognitive impairment. Information reviewed:  Medications, vital signs, orders, and nursing notes.    ROS:  Limited secondary to cognitive impairment but today pt reports fine.     Vitals:  /82   Pulse 82   Temp 98.3  F (36.8  C)   Resp 17   Ht 1.575 m (5' 2\")   Wt 58.9 kg (129 lb 12.8 oz)   SpO2 92%   BMI 23.74 kg/m    Body mass index is 23.74 kg/m .    Exam:  GENERAL APPEARANCE: NAD, in bed    RESPIRATORY: Clear to auscultation, even and unlabored, symmetrical chest wall expansion  CARDIOVASCULAR: RRR, no peripheral edema, S1/S2 normal   GASTROINTESTINAL: Soft, nontender, nondistended, bowel sounds positive  MUSCULOSKELETAL: In bed, gait not assessed   NEUROLOGICAL: Alert to self, memory loss, confusion  PSYCHOLOGICAL: Cooperative, calm     Lab/Diagnostic data:   Recent labs in [facility] reviewed by me today.     ASSESSMENT/PLAN  Lewy body dementia  Bipolar disorder  Depression  -Stable mental and functional status, taken off Aricept in March by ACP   -Continue lamotrigine, mirtazapine, seroquel, and sertraline,  monitor for changes in mood, behavior, and functional status, ACP f/u     Constipation   -Stable   -Continue current care, monitor bowel function           Electronically signed by:  Pinky Dawn DNP,APRYANI,AGNP-BC.               The above note was completed in part using Dragon voice recognition software. Although reviewed after completion, some word and grammatical errors may occur. Please contact the author of this note with any questions.                 Sincerely,        ASCENCION Galicia CNP      "

## 2023-06-01 ENCOUNTER — TELEPHONE (OUTPATIENT)
Dept: GERIATRICS | Facility: CLINIC | Age: 88
End: 2023-06-01
Payer: MEDICARE

## 2023-06-01 NOTE — TELEPHONE ENCOUNTER
[facility] En[facility]    Provider: ASCENCION Rios CNP  Facility: [facility] Facility Type:  C    Caller: Alexis      Allergies:    Allergies   Allergen Reactions     No Known Allergies         Reason for call: Patient found sitting on the floor this afternoon, no injuries at time, vital signs stable.    Verbal Order/Direction given by Provider: Continue monitor per facility protocol.    Provider giving Order:  ASCENCION Rios CNP    Verbal Order given to: Alexis Dumont RN

## 2023-06-19 ENCOUNTER — NURSING HOME VISIT (OUTPATIENT)
Dept: GERIATRICS | Facility: CLINIC | Age: 88
End: 2023-06-19
Payer: COMMERCIAL

## 2023-06-19 VITALS
RESPIRATION RATE: 18 BRPM | WEIGHT: 131 LBS | BODY MASS INDEX: 24.11 KG/M2 | HEART RATE: 95 BPM | HEIGHT: 62 IN | SYSTOLIC BLOOD PRESSURE: 118 MMHG | TEMPERATURE: 98.1 F | OXYGEN SATURATION: 96 % | DIASTOLIC BLOOD PRESSURE: 70 MMHG

## 2023-06-19 DIAGNOSIS — R29.6 REPEATED FALLS: Primary | ICD-10-CM

## 2023-06-19 DIAGNOSIS — K59.01 SLOW TRANSIT CONSTIPATION: ICD-10-CM

## 2023-06-19 PROCEDURE — 99309 SBSQ NF CARE MODERATE MDM 30: CPT

## 2023-06-19 NOTE — LETTER
"    6/19/2023        RE: Maribel Sevilla  [facility]  [address] [facility] [address]        [facility] GERIATRICS    Chief Complaint   Patient presents with     RECHECK     HPI:  Maribel Sevilla is a 89 year old  (12/20/1931), who is being seen today for an episodic care visit at: [facility] () [[id number]]. Today's concern is: Fall f/u     Patient had unwitnessed fall this morning. She was found in the supine position laying on floor near end of her bed.  Apparently she told staff \"I need to get dressed for the company\". Vitals were stable. Neuro at baseline for patient.     Seen and examined in bed this afternoon. She is unable to tell how she fell. Denies pain. No distress. Breathing fine. Being closely monitored by staff for any changes in condition.     Allergies, and PMH/PSH reviewed in [facility] today.  REVIEW OF SYSTEMS:  Limited secondary to cognitive impairment but today pt reports no pain.     Objective:   /70   Pulse 95   Temp 98.1  F (36.7  C)   Resp 18   Ht 1.575 m (5' 2\")   Wt 59.4 kg (131 lb)   SpO2 96%   BMI 23.96 kg/m      Exam:  GENERAL APPEARANCE: NAD, in bed without acute distress   RESPIRATORY: Clear to auscultation, even and unlabored, symmetrical chest wall expansion  CARDIOVASCULAR: RRR, no peripheral edema, S1/S2 normal   GASTROINTESTINAL: Soft, nontender, nondistended, bowel sounds positive   NEUROLOGICAL: Alert to self, memory loss, confusion  PSYCHOLOGICAL: Cooperative, calm     Recent labs in [facility] reviewed by me today.     Assessment/Plan:  -Repeated falls  -Complicated by underlying cognitive impairment  -PT follow-up as needed, utilize floor mats, fall precaution, monitor for safety     Constipation   -Stable   -Continue current care, monitor bowel function        MED REC REQUIRED  Post Medication Reconciliation Status:  Patient was not discharged from an inpatient facility or TCU            Electronically signed by:   Pinky" HARRY Dawn,ASCENCION,AGNP-BC.               The above note was completed in part using Dragon voice recognition software. Although reviewed after completion, some word and grammatical errors may occur. Please contact the author of this note with any questions.               Sincerely,        ASCENCION Galicia CNP

## 2023-06-20 NOTE — PROGRESS NOTES
"[facility] GERIATRICS    Chief Complaint   Patient presents with     RECHECK     HPI:  Maribel Sevilla is a 89 year old  (12/20/1931), who is being seen today for an episodic care visit at: [facility] () [[id number]]. Today's concern is: Fall f/u     Patient had unwitnessed fall this morning. She was found in the supine position laying on floor near end of her bed.  Apparently she told staff \"I need to get dressed for the company\". Vitals were stable. Neuro at baseline for patient.     Seen and examined in bed this afternoon. She is unable to tell how she fell. Denies pain. No distress. Breathing fine. Being closely monitored by staff for any changes in condition.     Allergies, and PMH/PSH reviewed in [facility] today.  REVIEW OF SYSTEMS:  Limited secondary to cognitive impairment but today pt reports no pain.     Objective:   /70   Pulse 95   Temp 98.1  F (36.7  C)   Resp 18   Ht 1.575 m (5' 2\")   Wt 59.4 kg (131 lb)   SpO2 96%   BMI 23.96 kg/m      Exam:  GENERAL APPEARANCE: NAD, in bed without acute distress   RESPIRATORY: Clear to auscultation, even and unlabored, symmetrical chest wall expansion  CARDIOVASCULAR: RRR, no peripheral edema, S1/S2 normal   GASTROINTESTINAL: Soft, nontender, nondistended, bowel sounds positive   NEUROLOGICAL: Alert to self, memory loss, confusion  PSYCHOLOGICAL: Cooperative, calm     Recent labs in [facility] reviewed by me today.     Assessment/Plan:  -Repeated falls  -Complicated by underlying cognitive impairment  -PT follow-up as needed, utilize floor mats, fall precaution, monitor for safety     Constipation   -Stable   -Continue current care, monitor bowel function        MED REC REQUIRED  Post Medication Reconciliation Status:  Patient was not discharged from an inpatient facility or TCU            Electronically signed by:   Pinky Dawn,HARRY,APRN,AGNP-BC.               The above note was completed in part using Dragon voice recognition software. Although " reviewed after completion, some word and grammatical errors may occur. Please contact the author of this note with any questions.

## 2023-07-09 ENCOUNTER — NURSING HOME VISIT (OUTPATIENT)
Dept: GERIATRICS | Facility: CLINIC | Age: 88
End: 2023-07-09
Payer: COMMERCIAL

## 2023-07-09 DIAGNOSIS — R29.6 REPEATED FALLS: ICD-10-CM

## 2023-07-09 DIAGNOSIS — F31.9 BIPOLAR AFFECTIVE DISORDER, REMISSION STATUS UNSPECIFIED (H): ICD-10-CM

## 2023-07-09 DIAGNOSIS — F02.80 LEWY BODY DEMENTIA WITHOUT BEHAVIORAL DISTURBANCE (H): Primary | ICD-10-CM

## 2023-07-09 DIAGNOSIS — G31.83 LEWY BODY DEMENTIA WITHOUT BEHAVIORAL DISTURBANCE (H): Primary | ICD-10-CM

## 2023-07-09 PROCEDURE — 99308 SBSQ NF CARE LOW MDM 20: CPT | Performed by: INTERNAL MEDICINE

## 2023-07-11 NOTE — PROGRESS NOTES
"Patient was seen for regulatory long-term care visit    Course reviewed with nursing staff    Patient has had multiple falls recently generally related to poor judgment and impulsivity.    Today, she denies any acute physical concerns.  She frequently states \"pray for me ,\"smiling as she says this  She denies abdominal pain, nausea, vomiting, chest pain, shortness of breath    Vital signs stable  Patient is alert, very interactive, pleasant, lying in bed  She is oriented to self  HEENT: Poor dentition  Lungs clear  CV regular rhythm  Abdomen soft, nontender  No extremity edema  No tremors or rigidity      Assessment    Lewy body dementia, overall stable mental and functional status  Bipolar disorder  Patient appears more delusional today than general, remains on Lamictal, sertraline, Seroquel and mirtazapine  Plan: Monitor mental status, vital signs.  Patient states she understands the need to ask for help rather than to try to get out of bed by herself    History of chronic intermittent abdominal pain, most likely secondary to constipation versus irritable bowel syndrome  Currently no GI complaints.  Abdominal exam is benign  Plan: Monitor GI status, continue mer Arreguin MD      "

## 2023-09-08 ENCOUNTER — NURSING HOME VISIT (OUTPATIENT)
Dept: GERIATRICS | Facility: CLINIC | Age: 88
End: 2023-09-08
Payer: COMMERCIAL

## 2023-09-08 VITALS
HEIGHT: 62 IN | TEMPERATURE: 97.8 F | BODY MASS INDEX: 23.37 KG/M2 | RESPIRATION RATE: 16 BRPM | DIASTOLIC BLOOD PRESSURE: 54 MMHG | HEART RATE: 97 BPM | WEIGHT: 127 LBS | SYSTOLIC BLOOD PRESSURE: 99 MMHG | OXYGEN SATURATION: 91 %

## 2023-09-08 DIAGNOSIS — G31.83 LEWY BODY DEMENTIA WITHOUT BEHAVIORAL DISTURBANCE (H): Primary | ICD-10-CM

## 2023-09-08 DIAGNOSIS — R29.6 REPEATED FALLS: ICD-10-CM

## 2023-09-08 DIAGNOSIS — K59.01 SLOW TRANSIT CONSTIPATION: ICD-10-CM

## 2023-09-08 DIAGNOSIS — F02.80 LEWY BODY DEMENTIA WITHOUT BEHAVIORAL DISTURBANCE (H): Primary | ICD-10-CM

## 2023-09-08 DIAGNOSIS — F31.9 BIPOLAR AFFECTIVE DISORDER, REMISSION STATUS UNSPECIFIED (H): ICD-10-CM

## 2023-09-08 DIAGNOSIS — F33.2 SEVERE EPISODE OF RECURRENT MAJOR DEPRESSIVE DISORDER, WITHOUT PSYCHOTIC FEATURES (H): ICD-10-CM

## 2023-09-08 PROCEDURE — 99309 SBSQ NF CARE MODERATE MDM 30: CPT

## 2023-09-08 NOTE — PROGRESS NOTES
[facility] GERIATRICS  Chief Complaint   Patient presents with    [facility] Regulatory    Wellness Visit     [place] Medical Record Number:  [id number]  Place of Service where encounter took place:  [facility] () [[id number]]    HPI:    Maribel Sevilla  is 89 year old (12/20/1931), who is being seen today for a federally mandated E/M visit. Today's concerns are: Routine LTC follow-up    Seen and examined in bed. History limited. States she is fine.  Minimally opens her eyes.  Course reviewed with nursing staff.  No acute concerns since last visit.    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  FAMILY HISTORY: family history includes C.A.D. in her brother, father, mother, and sister; Psychotic Disorder in her son.  SOCIAL HISTORY:  reports that she has never smoked. She has never used smokeless tobacco. She reports that she does not drink alcohol and does not use drugs.    MEDICATIONS:  MED REC REQUIRED  Post Medication Reconciliation Status:  Patient was not discharged from an inpatient facility or TCU         Review of your medicines            Accurate as of September 8, 2023 10:33 AM. If you have any questions, ask your nurse or doctor.                CONTINUE these medicines which have NOT CHANGED        Dose / Directions   ACE/ARB/ARNI NOT PRESCRIBED  Commonly known as: INTENTIONAL  Used for: CKD (chronic kidney disease) stage 4, GFR 15-29 ml/min (H)      Please choose reason not prescribed from choices below.  Refills: 0     acetaminophen 500 MG tablet  Commonly known as: TYLENOL      Dose:  500 mg  Take 500 mg by mouth 2 times daily and 1 tab every 4 hours as needed  Refills: 0     ferrous sulfate 325 (65 Fe) MG tablet  Commonly known as: FEROSUL  Used for: Iron deficiency anemia, unspecified iron deficiency anemia type      Dose: 325 mg  Take 1 tablet (325 mg) by mouth daily (with breakfast)  Refills: 0     lamoTRIgine 200 MG tablet  Commonly known as: LaMICtal  Used for: Lewy body dementia without behavioral disturbance (H)      Dose: 200 mg  Take 1 tablet (200 mg) by mouth every morning  Refills: 0     mirtazapine 15 MG tablet  Commonly known as: Remeron      Dose: 15 mg  Take 1 tablet (15 mg) by mouth At Bedtime  Refills: 0     ondansetron 4 MG tablet  Commonly known as: ZOFRAN      Dose: 4 mg  Take 4 mg by mouth 4 times daily  Refills: 0     pantoprazole 40 MG EC tablet  Commonly known as: PROTONIX      Dose: 40 mg  Take 40 mg by mouth daily  Refills: 0     polyethylene glycol 17 g packet  Commonly known as: MIRALAX      Dose: 1 packet  Take 1 packet by mouth daily  Refills: 0     PRESERVISION AREDS 2 PO      Dose: 1 tablet  Take 1 tablet by mouth daily  Refills: 0     QUEtiapine 50 MG tablet  Commonly known as: SEROquel      Dose: 200 mg  Take 200 mg by mouth At Bedtime  Refills: 0     senna-docusate 8.6-50 MG tablet  Commonly known as: SENOKOT-S/PERICOLACE      Dose: 1 tablet  Take 1 tablet by mouth daily May also have 2 tablets po BID PRN  Refills: 0     sertraline 100 MG tablet  Commonly known as: Zoloft  Used for: Lewy body dementia without behavioral disturbance (H)      Dose: 100 mg  Take 1 tablet (100 mg) by mouth daily  Refills: 0             Case Management:  I have reviewed the care plan and MDS and do agree with the plan. Patient's desire to return to the community is not assessible due to cognitive impairment. Information reviewed:  Medications, vital signs, orders, and nursing notes.    ROS:  Limited secondary to cognitive impairment but today pt reports fine     Vitals:  BP 99/54    "Pulse 97   Temp 97.8  F (36.6  C)   Resp 16   Ht 1.575 m (5' 2\")   Wt 57.6 kg (127 lb)   SpO2 91%   BMI 23.23 kg/m    Body mass index is 23.23 kg/m .    Exam:  GENERAL APPEARANCE: NAD, in bed   RESPIRATORY: Clear to auscultation, even and unlabored, symmetrical chest wall expansion  CARDIOVASCULAR: RRR, no peripheral edema, S1/S2 normal   GASTROINTESTINAL: Soft, nontender, nondistended, bowel sounds positive   NEUROLOGICAL: Alert to self, memory loss, confusion  PSYCHOLOGICAL: Calm     Lab/Diagnostic data:   Recent labs in [facility] [employer] reviewed by me today.     ASSESSMENT/PLAN  Lewy body dementia  Bipolar disorder  Depression  -Stable mental and functional status, off Aricept  -Continue current care, monitor for changes in mood, behavior, and functional status, ACP f/u     -Repeated falls  -Complicated by underlying cognitive impairment  -PT as needed, fall precaution      Constipation   -Stable   -Continue current care, monitor bowel function           Electronically signed by:  Pinky Dawn,DNP,APRN,AGNP-BC.           Annual Wellness Visit    Are you in the first 12 months of your Medicare Part B coverage?  No    Physical Health:  In general, how would you rate your overall physical health? Unable to assess   Outside of work, how many days during the week do you exercise?none  Outside of work, approximately how many minutes a day do you exercise?not applicable  If you drink alcohol do you typically have >3 drinks per day or >7 drinks per week? Not Applicable  Do you usually eat at least 4 servings of fruit and vegetables a day, include whole grains & fiber and avoid regularly eating high fat or \"junk\" foods? Yes  Do you have any problems taking medications regularly? Unable to assess   Do you have any side effects from medications? none  Needs assistance for the following daily activities:  LTC resident   Which of the following safety concerns are present in your home?  none identified   Hearing impairment: Unable to " assess   In the past 6 months, have you been bothered by leaking of urine? Unable to assess     Mental Health:  In general, how would you rate your overall mental or emotional health? Unable to assess     PHQ-2 Score: Unable to assess         12/6/2011    10:18 AM 11/1/2011     9:26 AM   PHQ-2 ( 1999 [employer])   Q1: Little interest or pleasure in doing things 0 0   Q2: Feeling down, depressed or hopeless 0 0   PHQ-2 Score 0 0        Do you feel safe in your environment?  Unable to assess     Have you ever done Advance Care Planning? (For example, a Health Directive, POLST, or a discussion with a medical provider or your loved ones about your wishes)? Yes, advance care planning is on file.    Fall risk:  Fall Risk Assessment not completed.    Cognitive Screening: Not appropriate due to known dementia        Current providers sharing in care for this patient include:   Patient Care Team:  Pinky Dawn DNP APRN CNP as PCP - General (Geriatric Medicine)  Jade Wade [facility] as Pharmacist (Pharmacist)  Arjun Arreguin MD as MD (Internal Medicine - Geriatric Medicine)  [facility]  Doreen Pinon APRN CNP as Assigned PCP  Odette Blackwell as Case Management Specialist  Veronica Barton, RN as Lead Care Coordinator (Primary Care - CC)  Patricia Banks DNP,ASCENCION,AGNP-BC.               The above note was completed in part using Dragon voice recognition software. Although reviewed after completion, some word and grammatical errors may occur. Please contact the author of this note with any questions.

## 2023-09-08 NOTE — LETTER
9/8/2023        RE: Maribel Sevilla  [facility]  [address] [facility] [address]        [facility] GERIATRICS  Chief Complaint   Patient presents with     retirement Regulatory     Wellness Visit     [place] Medical Record Number:  [id number]  Place of Service where encounter took place:  [facility] () [[id number]]    HPI:    Maribel Sevilla  is 89 year old (12/20/1931), who is being seen today for a federally mandated E/M visit. Today's concerns are: Routine LT follow-up    Seen and examined in bed. History limited. States she is fine.  Minimally opens her eyes.  Course reviewed with nursing staff.  No acute concerns since last visit.    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  FAMILY HISTORY: family history includes C.A.D. in her brother, father, mother, and sister; Psychotic Disorder in her son.  SOCIAL HISTORY:  reports that she has never smoked. She has never used smokeless tobacco. She reports that she does not drink alcohol and does not use drugs.    MEDICATIONS:  MED REC REQUIRED  Post Medication Reconciliation Status:  Patient was not discharged from an inpatient facility or TCU         Review of your medicines            Accurate as of September 8, 2023 10:33 AM. If you have any questions, ask your nurse or doctor.                CONTINUE these medicines which have NOT CHANGED        Dose / Directions   ACE/ARB/ARNI NOT PRESCRIBED  Commonly known as: INTENTIONAL  Used for: CKD (chronic kidney disease) stage 4, GFR 15-29 ml/min (H)      Please  choose reason not prescribed from choices below.  Refills: 0     acetaminophen 500 MG tablet  Commonly known as: TYLENOL      Dose: 500 mg  Take 500 mg by mouth 2 times daily and 1 tab every 4 hours as needed  Refills: 0     ferrous sulfate 325 (65 Fe) MG tablet  Commonly known as: FEROSUL  Used for: Iron deficiency anemia, unspecified iron deficiency anemia type      Dose: 325 mg  Take 1 tablet (325 mg) by mouth daily (with breakfast)  Refills: 0     lamoTRIgine 200 MG tablet  Commonly known as: LaMICtal  Used for: Lewy body dementia without behavioral disturbance (H)      Dose: 200 mg  Take 1 tablet (200 mg) by mouth every morning  Refills: 0     mirtazapine 15 MG tablet  Commonly known as: Remeron      Dose: 15 mg  Take 1 tablet (15 mg) by mouth At Bedtime  Refills: 0     ondansetron 4 MG tablet  Commonly known as: ZOFRAN      Dose: 4 mg  Take 4 mg by mouth 4 times daily  Refills: 0     pantoprazole 40 MG EC tablet  Commonly known as: PROTONIX      Dose: 40 mg  Take 40 mg by mouth daily  Refills: 0     polyethylene glycol 17 g packet  Commonly known as: MIRALAX      Dose: 1 packet  Take 1 packet by mouth daily  Refills: 0     PRESERVISION AREDS 2 PO      Dose: 1 tablet  Take 1 tablet by mouth daily  Refills: 0     QUEtiapine 50 MG tablet  Commonly known as: SEROquel      Dose: 200 mg  Take 200 mg by mouth At Bedtime  Refills: 0     senna-docusate 8.6-50 MG tablet  Commonly known as: SENOKOT-S/PERICOLACE      Dose: 1 tablet  Take 1 tablet by mouth daily May also have 2 tablets po BID PRN  Refills: 0     sertraline 100 MG tablet  Commonly known as: Zoloft  Used for: Lewy body dementia without behavioral disturbance (H)      Dose: 100 mg  Take 1 tablet (100 mg) by mouth daily  Refills: 0             Case Management:  I have reviewed the care plan and MDS and do agree with the plan. Patient's desire to return to the community is not assessible due to cognitive impairment. Information reviewed:  Medications, vital  "signs, orders, and nursing notes.    ROS:  Limited secondary to cognitive impairment but today pt reports fine     Vitals:  BP 99/54   Pulse 97   Temp 97.8  F (36.6  C)   Resp 16   Ht 1.575 m (5' 2\")   Wt 57.6 kg (127 lb)   SpO2 91%   BMI 23.23 kg/m    Body mass index is 23.23 kg/m .    Exam:  GENERAL APPEARANCE: NAD, in bed   RESPIRATORY: Clear to auscultation, even and unlabored, symmetrical chest wall expansion  CARDIOVASCULAR: RRR, no peripheral edema, S1/S2 normal   GASTROINTESTINAL: Soft, nontender, nondistended, bowel sounds positive   NEUROLOGICAL: Alert to self, memory loss, confusion  PSYCHOLOGICAL: Calm     Lab/Diagnostic data:   Recent labs in [facility] reviewed by me today.     ASSESSMENT/PLAN  Lewy body dementia  Bipolar disorder  Depression  -Stable mental and functional status, off Aricept  -Continue current care, monitor for changes in mood, behavior, and functional status, ACP f/u     -Repeated falls  -Complicated by underlying cognitive impairment  -PT as needed, fall precaution      Constipation   -Stable   -Continue current care, monitor bowel function           Electronically signed by:  Pinky Dawn,DNP,APRN,AGNP-BC.           Annual Wellness Visit    Are you in the first 12 months of your Medicare Part B coverage?  No    Physical Health:  In general, how would you rate your overall physical health? Unable to assess   Outside of work, how many days during the week do you exercise?none  Outside of work, approximately how many minutes a day do you exercise?not applicable  If you drink alcohol do you typically have >3 drinks per day or >7 drinks per week? Not Applicable  Do you usually eat at least 4 servings of fruit and vegetables a day, include whole grains & fiber and avoid regularly eating high fat or \"junk\" foods? Yes  Do you have any problems taking medications regularly? Unable to assess   Do you have any side effects from medications? none  Needs assistance for the following daily " activities:  LTC resident   Which of the following safety concerns are present in your home?  none identified   Hearing impairment: Unable to assess   In the past 6 months, have you been bothered by leaking of urine? Unable to assess     Mental Health:  In general, how would you rate your overall mental or emotional health? Unable to assess     PHQ-2 Score: Unable to assess         12/6/2011    10:18 AM 11/1/2011     9:26 AM   PHQ-2 ( 1999 Pfizer)   Q1: Little interest or pleasure in doing things 0 0   Q2: Feeling down, depressed or hopeless 0 0   PHQ-2 Score 0 0        Do you feel safe in your environment?  Unable to assess     Have you ever done Advance Care Planning? (For example, a Health Directive, POLST, or a discussion with a medical provider or your loved ones about your wishes)? Yes, advance care planning is on file.    Fall risk:  Fall Risk Assessment not completed.    Cognitive Screening: Not appropriate due to known dementia        Current providers sharing in care for this patient include:   Patient Care Team:  Pinky Dawn DNP APRN CNP as PCP - General (Geriatric Medicine)  [facility] as Pharmacist (Pharmacist)  Arjun Arreguin MD as MD (Internal Medicine - Geriatric Medicine)  [facility]  Doreen Pinon APRN CNP as Assigned PCP  Odette Blackwell as Case Management Specialist  Veronica Barton, RN as Lead Care Coordinator (Primary Care - CC)  Patricia Banks DNP, APRN,AGNP-BC.               The above note was completed in part using Dragon voice recognition software. Although reviewed after completion, some word and grammatical errors may occur. Please contact the author of this note with any questions.                     Sincerely,        ASCENCION Galicia CNP

## 2023-09-13 ENCOUNTER — PATIENT OUTREACH (OUTPATIENT)
Dept: GERIATRIC MEDICINE | Facility: CLINIC | Age: 88
End: 2023-09-13
Payer: MEDICARE

## 2023-09-13 NOTE — PROGRESS NOTES
[facility] Institutional Assessment     Institutional Assessment for Health Risk Assessment with Maribel Sevilla completed on September 13, 2023 at [facility]    Type of residence:: Nursing home  Current living arrangement:: I live in a nursing home     Assessment completed with:: Patient, Care Team Member, Children      Mental/Behavioral Health   Depression Screening: diagnosis of depression        Mental health DX:: Yes (bipolar)   Mental health DX how managed:: Medication    Falls Assessment:   Fallen 2 or more times in the past year?: No   Any fall with injury in the past year?: No, In June was found on her floor but no injury.     ADL/IADL Dependencies:   Dependent ADLs:: Ambulation-walker, Bathing, Dressing, Grooming, Incontinence, Transfers, Wheelchair-with assist, Toileting  Dependent IADLs:: Cleaning, Cooking, Laundry, Shopping, Meal Preparation, Medication Management, Money Management, Transportation, Incontinence      Care Plan & Recommendations: Member has lived at [facility] since 2011. Spends more time in bed now and on those days is very quiet, eats little and doesn't want to participate in anything.  Other days is very conversational. Is oriented x1.  Has a very involved family. Has walked with a seated walker in the past but it has become increasingly difficult so is happening less often now and for only a short distance.  Has a history of delusions and hallucinations.  Is taking an antipsychotic and staff report member being more content. Uses a wheelchair primarily now.  Discussed options/opportunities for transitions.    See Institutional Care Plan for detailed assessment information.    Obtained a copy of the facility care plan and MDS from facility electronic records. Requested of half-way social worker to put this care coordinator on care conference attendee list.    Placed the Health Plan facility face sheet in the member's facility chart.    Follow-Up Plan:  Member informed of future contact, plan to f/u with member with a 6 month assessment, attend 1 care conference annually, and will follow any hospitalizations or transitions. Care Coordinator contact information shared with member/family and facility, and encouraged to call this care coordinator with any questions or concerns at any time.     [facility] care continuum providers: Please see Snapshot and Care Management Flowsheets for Specific details of care plan.    This CC note routed to PCP, Pinky Dawn RN  [facility]  [phone]

## 2023-09-20 ENCOUNTER — DOCUMENTATION ONLY (OUTPATIENT)
Dept: OTHER | Facility: CLINIC | Age: 88
End: 2023-09-20
Payer: MEDICARE

## 2023-09-26 ENCOUNTER — CLINICAL UPDATE (OUTPATIENT)
Dept: PHARMACY | Facility: CLINIC | Age: 88
End: 2023-09-26
Payer: COMMERCIAL

## 2023-09-26 DIAGNOSIS — K21.9 GASTROESOPHAGEAL REFLUX DISEASE WITHOUT ESOPHAGITIS: ICD-10-CM

## 2023-09-26 DIAGNOSIS — F41.1 ANXIETY STATE: ICD-10-CM

## 2023-09-26 DIAGNOSIS — N18.4 CKD (CHRONIC KIDNEY DISEASE) STAGE 4, GFR 15-29 ML/MIN (H): Primary | ICD-10-CM

## 2023-09-26 DIAGNOSIS — F02.80 LEWY BODY DEMENTIA WITHOUT BEHAVIORAL DISTURBANCE (H): ICD-10-CM

## 2023-09-26 DIAGNOSIS — F33.2 SEVERE EPISODE OF RECURRENT MAJOR DEPRESSIVE DISORDER, WITHOUT PSYCHOTIC FEATURES (H): ICD-10-CM

## 2023-09-26 DIAGNOSIS — D53.9 DEFICIENCY ANEMIA: ICD-10-CM

## 2023-09-26 DIAGNOSIS — G31.83 LEWY BODY DEMENTIA WITHOUT BEHAVIORAL DISTURBANCE (H): ICD-10-CM

## 2023-09-26 DIAGNOSIS — F31.9 BIPOLAR AFFECTIVE DISORDER, REMISSION STATUS UNSPECIFIED (H): ICD-10-CM

## 2023-09-26 PROCEDURE — 99207 PR NO CHARGE LOS: CPT | Performed by: PHARMACIST

## 2023-09-26 NOTE — PROGRESS NOTES
This patient's medication list and chart were reviewed as part of the service provided by [facility].    Assessment/Recommendations:    If no active symptoms, may be of benefit to consider reduction in pantoprazole to 20mg once daily and monitor.  PPIs increase risk of Cdiff, pneumonia, low B12, low Mg, low BMD/inc fracture, and risk increases with dose.    Noted patient follows with psychiatry.  Often refuses labs, so NP has opted to continue on iron supplement.      Jade Wade, Pharm.D.,[facility]  Board Certified Geriatric Pharmacist  Medication Therapy Management Pharmacist  [phone]

## 2023-11-06 ENCOUNTER — NURSING HOME VISIT (OUTPATIENT)
Dept: GERIATRICS | Facility: CLINIC | Age: 88
End: 2023-11-06
Payer: COMMERCIAL

## 2023-11-06 VITALS
OXYGEN SATURATION: 94 % | RESPIRATION RATE: 18 BRPM | HEIGHT: 62 IN | DIASTOLIC BLOOD PRESSURE: 68 MMHG | TEMPERATURE: 97.7 F | HEART RATE: 65 BPM | WEIGHT: 123 LBS | SYSTOLIC BLOOD PRESSURE: 111 MMHG | BODY MASS INDEX: 22.63 KG/M2

## 2023-11-06 DIAGNOSIS — R10.84 ABDOMINAL PAIN, GENERALIZED: ICD-10-CM

## 2023-11-06 DIAGNOSIS — R63.4 WEIGHT LOSS: ICD-10-CM

## 2023-11-06 DIAGNOSIS — F02.80 LEWY BODY DEMENTIA WITHOUT BEHAVIORAL DISTURBANCE (H): Primary | ICD-10-CM

## 2023-11-06 DIAGNOSIS — G31.83 LEWY BODY DEMENTIA WITHOUT BEHAVIORAL DISTURBANCE (H): Primary | ICD-10-CM

## 2023-11-06 PROCEDURE — 99308 SBSQ NF CARE LOW MDM 20: CPT | Performed by: INTERNAL MEDICINE

## 2023-11-06 NOTE — PROGRESS NOTES
"[facility] GERIATRICS  Chief Complaint   Patient presents with    [facility] Medical Record Number:  [id number]  Place of Service where encounter took place:  [facility] ()     HPI:    Maribel Sevilla  is 89 year old (12/20/1931), who is being seen today for a federally mandated E/M visit.     Course was reviewed in 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  FAMILY HISTORY: family history includes C.A.D. in her brother, father, mother, and sister; Psychotic Disorder in her son.  SOCIAL HISTORY:  reports that she has never smoked. She has never used smokeless tobacco. She reports that she does not drink alcohol and does not use drugs.    Medications were reviewed by me today    Vitals:  /68   Pulse 65   Temp 97.7  F (36.5  C)   Resp 18   Ht 1.575 m (5' 2\")   Wt 55.8 kg (123 lb)   SpO2 94%   BMI 22.50 kg/m    Body mass index is 22.5 kg/m .  Exam:  Thin appearing female, lying in bed.  Sleepy, alerts to name, oriented to self, minimally conversant with examiner  She appears comfortable  Lungs clear  CV regular rhythm  Abdomen soft, nondistended, nontender      ASSESSMENT/PLAN    Lewy body dementia  Bipolar disorder  Mental functional status stable  Patient remains on amitriptyline, quetiapine, sertraline, mirtazapine  Plan: Monitor mental status    Chronic intermittent abdominal " pain  Suspect functional etiology  Abdominal exam benign  Weight appears down approximately 9 pounds over the last 6 months, etiology unclear  Plan: Monitor GI status, weight  If persistent weight loss, consider further evaluation if patient desires      Arjun Arreguin MD

## 2023-11-06 NOTE — LETTER
"    11/6/2023        RE: Maribel Sevilla  [facility]  [address] [facility] [address]        [facility] GERIATRICS  Chief Complaint   Patient presents with     penitentiary Regulatory     [place] Medical Record Number:  [id number]  Place of Service where encounter took place:  [facility] ()     HPI:    Maribel Sevilla  is 89 year old (12/20/1931), who is being seen today for a federally mandated E/M visit.     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  FAMILY HISTORY: family history includes C.A.D. in her brother, father, mother, and sister; Psychotic Disorder in her son.  SOCIAL HISTORY:  reports that she has never smoked. She has never used smokeless tobacco. She reports that she does not drink alcohol and does not use drugs.    Medications were reviewed by me today    Vitals:  /68   Pulse 65   Temp 97.7  F (36.5  C)   Resp 18   Ht 1.575 m (5' 2\")   Wt 55.8 kg (123 lb)   SpO2 94%   BMI 22.50 kg/m    Body mass index is 22.5 kg/m .  Exam:  Thin appearing female, lying in bed.  Sleepy, alerts to name, oriented to self, minimally conversant with examiner  She appears comfortable  Lungs clear  CV regular rhythm  Abdomen soft, nondistended, nontender      ASSESSMENT/PLAN    Lewy body dementia  Bipolar disorder  Mental functional status " stable  Patient remains on amitriptyline, quetiapine, sertraline, mirtazapine  Plan: Monitor mental status    Chronic intermittent abdominal pain  Suspect functional etiology  Abdominal exam benign  Weight appears down approximately 9 pounds over the last 6 months, etiology unclear  Plan: Monitor GI status, weight  If persistent weight loss, consider further evaluation if patient desires      Arjun Arreguin MD            Sincerely,        Arjun Arreguin MD

## 2023-12-21 ENCOUNTER — DOCUMENTATION ONLY (OUTPATIENT)
Dept: GERIATRICS | Facility: CLINIC | Age: 88
End: 2023-12-21
Payer: MEDICARE

## 2024-01-24 ENCOUNTER — NURSING HOME VISIT (OUTPATIENT)
Dept: GERIATRICS | Facility: CLINIC | Age: 89
End: 2024-01-24
Payer: COMMERCIAL

## 2024-01-24 VITALS
HEIGHT: 62 IN | RESPIRATION RATE: 18 BRPM | DIASTOLIC BLOOD PRESSURE: 74 MMHG | BODY MASS INDEX: 22.26 KG/M2 | OXYGEN SATURATION: 94 % | TEMPERATURE: 97.8 F | HEART RATE: 80 BPM | WEIGHT: 121 LBS | SYSTOLIC BLOOD PRESSURE: 109 MMHG

## 2024-01-24 DIAGNOSIS — R41.89 UNRESPONSIVENESS: ICD-10-CM

## 2024-01-24 DIAGNOSIS — F31.9 BIPOLAR AFFECTIVE DISORDER, REMISSION STATUS UNSPECIFIED (H): ICD-10-CM

## 2024-01-24 DIAGNOSIS — G31.83 LEWY BODY DEMENTIA WITHOUT BEHAVIORAL DISTURBANCE (H): Primary | ICD-10-CM

## 2024-01-24 DIAGNOSIS — Z13.6 SCREENING FOR HYPERTENSION: ICD-10-CM

## 2024-01-24 DIAGNOSIS — R10.84 ABDOMINAL PAIN, GENERALIZED: ICD-10-CM

## 2024-01-24 DIAGNOSIS — K59.01 SLOW TRANSIT CONSTIPATION: ICD-10-CM

## 2024-01-24 DIAGNOSIS — N18.4 CKD (CHRONIC KIDNEY DISEASE) STAGE 4, GFR 15-29 ML/MIN (H): ICD-10-CM

## 2024-01-24 DIAGNOSIS — F33.2 SEVERE EPISODE OF RECURRENT MAJOR DEPRESSIVE DISORDER, WITHOUT PSYCHOTIC FEATURES (H): ICD-10-CM

## 2024-01-24 DIAGNOSIS — R11.2 NAUSEA AND VOMITING, UNSPECIFIED VOMITING TYPE: ICD-10-CM

## 2024-01-24 DIAGNOSIS — R29.6 REPEATED FALLS: ICD-10-CM

## 2024-01-24 DIAGNOSIS — K21.9 GASTROESOPHAGEAL REFLUX DISEASE WITHOUT ESOPHAGITIS: ICD-10-CM

## 2024-01-24 DIAGNOSIS — D64.9 ANEMIA, UNSPECIFIED TYPE: ICD-10-CM

## 2024-01-24 DIAGNOSIS — F02.80 LEWY BODY DEMENTIA WITHOUT BEHAVIORAL DISTURBANCE (H): Primary | ICD-10-CM

## 2024-01-24 PROCEDURE — 99310 SBSQ NF CARE HIGH MDM 45: CPT

## 2024-01-24 NOTE — LETTER
1/24/2024        RE: Maribel Sevilla  [facility]  [address] [facility] [address]        [facility] GERIATRICS  Chief Complaint   Patient presents with     Annual Comprehensive Nursing Home     [place] Medical Record Number:  [id number]  Place of Service where encounter took place:  [facility] () [[id number]]    HPI:    Maribel Sevilla  is a 89 year old  (12/20/1931), who is being seen today for an annual comprehensive visit. HPI information obtained from: facility chart records, facility staff, patient report, and [facility] chart review.     Seen and examined in bed.  History limited at baseline.  Alert awake.  She thanked me for visiting her and denies any concerns.  Course reviewed with staff.  No acute concerns at this time.    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      Current Outpatient Medications:      ACE/ARB/ARNI NOT PRESCRIBED (INTENTIONAL), Please choose reason not prescribed from choices below., Disp: , Rfl:      acetaminophen (TYLENOL) 500 MG tablet, Take 500 mg by mouth 2 times daily and 1 tab every 4 hours as needed, Disp: , Rfl:      ferrous sulfate (FEROSUL) 325 (65 Fe) MG tablet, Take 1 tablet (325 mg) by mouth daily (with breakfast), Disp: , Rfl:      lamoTRIgine (LAMICTAL) 200 MG tablet, Take 1 tablet (200 mg) by mouth every morning, Disp: , Rfl:      mirtazapine (REMERON) 15 MG tablet, Take 1 tablet (15 mg) by mouth At Bedtime, Disp: , Rfl:      Multiple Vitamins-Minerals (PRESERVISION AREDS 2 PO), Take 1 tablet by mouth  "daily, Disp: , Rfl:      ondansetron (ZOFRAN) 4 MG tablet, Take 4 mg by mouth 4 times daily , Disp: , Rfl:      pantoprazole (PROTONIX) 40 MG EC tablet, Take 40 mg by mouth daily , Disp: , Rfl:      polyethylene glycol (MIRALAX) 17 g packet, Take 1 packet by mouth daily, Disp: , Rfl:      QUEtiapine (SEROQUEL) 50 MG tablet, Take 200 mg by mouth At Bedtime , Disp: , Rfl:      senna-docusate (SENOKOT-S/PERICOLACE) 8.6-50 MG tablet, Take 1 tablet by mouth daily May also have 2 tablets po BID PRN, Disp: , Rfl:      sertraline (ZOLOFT) 100 MG tablet, Take 1 tablet (100 mg) by mouth daily, Disp: , Rfl:      MED REC REQUIRED  Post Medication Reconciliation Status: medication reconcilation previously completed during another office visit      Case Management:  I have reviewed the facility/SNF care plan/MDS, including the falls risk, nutrition and pain screening. I also reviewed the current immunizations, and preventive care.. Future cancer screening is not clinically indicated secondary to age/goals of care. Patient's desire to return to the community is not assessible due to cognitive impairment. Current Level of Care is appropriate.mhgeroimmunization: Annual Influenza per facility protocol    Advance Directive Discussion:    I reviewed the current advanced directives as reflected in EPIC, the POLST and the facility chart, and verified the congruency of orders . I contacted the first party  and discussed the plan of care. I did not due to cognitive impairment review the advance directives with the resident.     Team Discussion:  I communicated with the appropriate disciplines involved with the Plan of Care: Nursing  .   Patient's goal is: unobtainable secondary to cognitive impairment.  Information reviewed: Medications, vital signs, orders, and nursing notes.    ROS:  Limited secondary to cognitive impairment but today pt reports \" I am ok\".     Vitals:  /74   Pulse 80   Temp 97.8  F (36.6  C)   Resp 18   Ht " "1.575 m (5' 2\")   Wt 54.9 kg (121 lb)   SpO2 94%   BMI 22.13 kg/m   Body mass index is 22.13 kg/m .    Exam:  GENERAL APPEARANCE: NAD, in bed   HEENT-EARS: No discharge/drainage, [place]  HEENT-NECK: No lymphadenopathy  HEENT-EYES: PERRLA positive, no drainage/discharge  HEENT-NOSE/MOUTH/THROAT: No nasal drainage or erythema, mucous membranes moist  RESPIRATORY: Clear to auscultation, even and unlabored, symmetrical chest wall expansion  CARDIOVASCULAR: RRR, no peripheral edema, S1/S2 normal   GASTROINTESTINAL: Soft, nontender, nondistended, bowel sounds positive all quadrants  MUSCULOSKELETAL: In bed, gait not assessed   INTEGUMENTARY: Visualized areas intact  NEUROLOGICAL: Alert to self, memory loss, confusion, impaired balance  PSYCHOLOGICAL: Cooperative, good eye contact     Lab/Diagnostic data:   Recent labs in [facility] reviewed by me today.     ASSESSMENT/PLAN:  Lewy body dementia  Bipolar disorder  Depression  -Stable mental and functional status, off Aricept over the last year   -Continue lamotrigine 200 mg daily, Seroquel 200 mg daily, and sertraline 100 mg daily, monitor for changes in mood, behavior, and functional status, ACP follow up      -Repeated falls  -Complicated by underlying cognitive impairment  -PT as needed, fall precaution      Constipation   -Stable   -Continue Metamucil daily, MiraLAX daily, and senna 1 tab daily and 2 tabs daily as needed, monitor bowel function    Unresponsiveness episodes, unclear etiology  -Stable  -Monitor for changes in mental status     Abdominal pain  Nausea with emesis  GERD  -Episodic complains of abdominal pain with nausea and emesis at times, abdominal exam benign   -Continue Zofran 4 mg 4 times a day, will reduce pantoprazole to 20 mg daily (from 40 mg daily) per pharmacy recommendation, monitor GI symptoms    Anemia, chronic  -Patient noncompliant with lab draws  -Continue iron supplement, lab draws when patient permits    Anemia, chronic  -Patient noncompliant " with lab draws at times   -Continue iron supplement, check CBC     Screening for hypertension  CKD  -BP stable on no medications, CMP, monitor BP          Electronically signed by:  Pinky Dawn DNP,ASCENCION,NILOP-BC.                 The above note was completed in part using Dragon voice recognition software. Although reviewed after completion, some word and grammatical errors may occur. Please contact the author of this note with any questions.           Sincerely,        ASCENCION Galicia CNP

## 2024-01-24 NOTE — PROGRESS NOTES
[facility] GERIATRICS  Chief Complaint   Patient presents with    Annual Comprehensive Nursing Home     [place] Medical Record Number:  [id number]  Place of Service where encounter took place:  [facility] () [[id number]]    HPI:    Maribel Sevilla  is a 89 year old  (12/20/1931), who is being seen today for an annual comprehensive visit. HPI information obtained from: facility chart records, facility staff, patient report, and [facility] chart review.     Seen and examined in bed.  History limited at baseline.  Alert awake.  She thanked me for visiting her and denies any concerns.  Course reviewed with staff.  No acute concerns at this time.    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      Current Outpatient Medications:     ACE/ARB/ARNI NOT PRESCRIBED (INTENTIONAL), Please choose reason not prescribed from choices below., Disp: , Rfl:     acetaminophen (TYLENOL) 500 MG tablet, Take 500 mg by mouth 2 times daily and 1 tab every 4 hours as needed, Disp: , Rfl:     ferrous sulfate (FEROSUL) 325 (65 Fe) MG tablet, Take 1 tablet (325 mg) by mouth daily (with breakfast), Disp: , Rfl:     lamoTRIgine (LAMICTAL) 200 MG tablet, Take 1 tablet (200 mg) by mouth every morning, Disp: , Rfl:     mirtazapine (REMERON) 15 MG tablet, Take 1 tablet (15 mg) by mouth At Bedtime, Disp: , Rfl:     Multiple Vitamins-Minerals (PRESERVISION AREDS 2 PO), Take 1 tablet by mouth daily, Disp: , Rfl:     ondansetron (ZOFRAN) 4 MG tablet, Take 4 mg by mouth 4 times daily , Disp: , Rfl:     pantoprazole (PROTONIX) 40 MG EC  "tablet, Take 40 mg by mouth daily , Disp: , Rfl:     polyethylene glycol (MIRALAX) 17 g packet, Take 1 packet by mouth daily, Disp: , Rfl:     QUEtiapine (SEROQUEL) 50 MG tablet, Take 200 mg by mouth At Bedtime , Disp: , Rfl:     senna-docusate (SENOKOT-S/PERICOLACE) 8.6-50 MG tablet, Take 1 tablet by mouth daily May also have 2 tablets po BID PRN, Disp: , Rfl:     sertraline (ZOLOFT) 100 MG tablet, Take 1 tablet (100 mg) by mouth daily, Disp: , Rfl:      MED REC REQUIRED  Post Medication Reconciliation Status: medication reconcilation previously completed during another office visit      Case Management:  I have reviewed the facility/SNF care plan/MDS, including the falls risk, nutrition and pain screening. I also reviewed the current immunizations, and preventive care.. Future cancer screening is not clinically indicated secondary to age/goals of care. Patient's desire to return to the community is not assessible due to cognitive impairment. Current Level of Care is appropriate.mhgeroimmunization: Annual Influenza per facility protocol    Advance Directive Discussion:    I reviewed the current advanced directives as reflected in EPIC, the POLST and the facility chart, and verified the congruency of orders . I contacted the first party  and discussed the plan of care. I did not due to cognitive impairment review the advance directives with the resident.     Team Discussion:  I communicated with the appropriate disciplines involved with the Plan of Care: Nursing  .   Patient's goal is: unobtainable secondary to cognitive impairment.  Information reviewed: Medications, vital signs, orders, and nursing notes.    ROS:  Limited secondary to cognitive impairment but today pt reports \" I am ok\".     Vitals:  /74   Pulse 80   Temp 97.8  F (36.6  C)   Resp 18   Ht 1.575 m (5' 2\")   Wt 54.9 kg (121 lb)   SpO2 94%   BMI 22.13 kg/m   Body mass index is 22.13 kg/m .    Exam:  GENERAL APPEARANCE: NAD, in bed "   HEENT-EARS: No discharge/drainage, [place]  HEENT-NECK: No lymphadenopathy  HEENT-EYES: PERRLA positive, no drainage/discharge  HEENT-NOSE/MOUTH/THROAT: No nasal drainage or erythema, mucous membranes moist  RESPIRATORY: Clear to auscultation, even and unlabored, symmetrical chest wall expansion  CARDIOVASCULAR: RRR, no peripheral edema, S1/S2 normal   GASTROINTESTINAL: Soft, nontender, nondistended, bowel sounds positive all quadrants  MUSCULOSKELETAL: In bed, gait not assessed   INTEGUMENTARY: Visualized areas intact  NEUROLOGICAL: Alert to self, memory loss, confusion, impaired balance  PSYCHOLOGICAL: Cooperative, good eye contact     Lab/Diagnostic data:   Recent labs in [facility] reviewed by me today.     ASSESSMENT/PLAN:  Lewy body dementia  Bipolar disorder  Depression  -Stable mental and functional status, off Aricept over the last year   -Continue lamotrigine 200 mg daily, Seroquel 200 mg daily, and sertraline 100 mg daily, monitor for changes in mood, behavior, and functional status, ACP follow up      -Repeated falls  -Complicated by underlying cognitive impairment  -PT as needed, fall precaution      Constipation   -Stable   -Continue Metamucil daily, MiraLAX daily, and senna 1 tab daily and 2 tabs daily as needed, monitor bowel function    Unresponsiveness episodes, unclear etiology  -Stable  -Monitor for changes in mental status     Abdominal pain  Nausea with emesis  GERD  -Episodic complains of abdominal pain with nausea and emesis at times, abdominal exam benign   -Continue Zofran 4 mg 4 times a day, will reduce pantoprazole to 20 mg daily (from 40 mg daily) per pharmacy recommendation, monitor GI symptoms    Anemia, chronic  -Patient noncompliant with lab draws  -Continue iron supplement, lab draws when patient permits    Anemia, chronic  -Patient noncompliant with lab draws at times   -Continue iron supplement, check CBC     Screening for hypertension  CKD  -BP stable on no medications, CMP, monitor  BP          Electronically signed by:  Pinky Dawn DNP,APRN,SONY.                 The above note was completed in part using Dragon voice recognition software. Although reviewed after completion, some word and grammatical errors may occur. Please contact the author of this note with any questions.

## 2024-02-06 ENCOUNTER — TELEPHONE (OUTPATIENT)
Dept: GERIATRICS | Facility: CLINIC | Age: 89
End: 2024-02-06
Payer: MEDICARE

## 2024-02-06 NOTE — TELEPHONE ENCOUNTER
[facility] Geriatrics Triage Nurse Telephone Encounter    Provider: ASCENCION Rios CNP  Facility: [facility] Facility Type:  LTC    Caller: Missy  Call Back Number:     Allergies:    Allergies   Allergen Reactions    No Known Allergies         Reason for call: Nurse called to request an order for hospice eval and treat per family request.      Verbal Order/Direction given by Provider: Okay for hospice eval and treat per family request.      Provider giving Order:  ASCENCION Rios CNP    Verbal Order given to: Missy Yeboah RN

## 2024-02-07 ENCOUNTER — TELEPHONE (OUTPATIENT)
Dept: GERIATRICS | Facility: CLINIC | Age: 89
End: 2024-02-07
Payer: MEDICARE

## 2024-02-07 NOTE — TELEPHONE ENCOUNTER
Resident slid off bed onto the floor mats.  Staff monitoring for any ill effects like bruising or skin tears.  Vitals stable.    ASCENCION Bunn CNP

## 2024-03-04 ENCOUNTER — NURSING HOME VISIT (OUTPATIENT)
Dept: GERIATRICS | Facility: CLINIC | Age: 89
End: 2024-03-04
Payer: OTHER MISCELLANEOUS

## 2024-03-04 DIAGNOSIS — F02.80 LEWY BODY DEMENTIA WITHOUT BEHAVIORAL DISTURBANCE (H): Primary | ICD-10-CM

## 2024-03-04 DIAGNOSIS — F31.9 BIPOLAR AFFECTIVE DISORDER, REMISSION STATUS UNSPECIFIED (H): ICD-10-CM

## 2024-03-04 DIAGNOSIS — G31.83 LEWY BODY DEMENTIA WITHOUT BEHAVIORAL DISTURBANCE (H): Primary | ICD-10-CM

## 2024-03-04 PROCEDURE — 99307 SBSQ NF CARE SF MDM 10: CPT | Performed by: INTERNAL MEDICINE

## 2024-03-05 NOTE — PROGRESS NOTES
Patient was seen for regulatory long-term care visit    Patient has been entered into hospice with diagnosis of Lewy body dementia.    There has been a gradual decline in her mental and functional status    This morning, patient is lying in bed, alert, more interactive than usual  She is oriented to herself  She denies any complaints including abdominal pain which is a frequent complaint of hers  Lungs clear  CV RRR  Abdomen soft, nontender      Assessment    Lewy body dementia, with recent decline in mental functional status  History of chronic abdominal pain of unclear etiology, currently stable  History of bipolar affect disorder, appears stable on sertraline, quetiapine, Remeron and Lamictal.    Plan: Continue comfort cares with hospice

## 2024-04-12 ENCOUNTER — TELEPHONE (OUTPATIENT)
Dept: GERIATRICS | Facility: CLINIC | Age: 89
End: 2024-04-12
Payer: COMMERCIAL

## 2024-04-12 NOTE — TELEPHONE ENCOUNTER
[facility] NON-EMERGENT ENCOUNTER    Maribel Sevilla is a 89 year old  (12/20/1931), call received today regarding:   Unwitnessed fall    Disposition  Nurse called to report that patient had a fall this afternoon.  Patient was found half on her floor mat and the floor by her bed.  2 bumps with bruising noted to her right temple.  Nothing was near patient when she was found so staff are thinking she just hit her head on the floor.  VS: 117/72, 90, 18, and O2 89% on RA.  Patient is not on any blood thinners.  Patient is on hospice services so nurse will call them next.  Continue to monitor per facility protocol.      Requests  FYI      Electronically signed by:   Martine Yeboah RN

## 2024-04-17 ENCOUNTER — NURSING HOME VISIT (OUTPATIENT)
Dept: GERIATRICS | Facility: CLINIC | Age: 89
End: 2024-04-17
Payer: OTHER MISCELLANEOUS

## 2024-04-17 VITALS
HEIGHT: 62 IN | HEART RATE: 75 BPM | BODY MASS INDEX: 22.45 KG/M2 | RESPIRATION RATE: 16 BRPM | DIASTOLIC BLOOD PRESSURE: 58 MMHG | OXYGEN SATURATION: 93 % | SYSTOLIC BLOOD PRESSURE: 99 MMHG | WEIGHT: 122 LBS | TEMPERATURE: 97.5 F

## 2024-04-17 DIAGNOSIS — R29.6 RECURRENT FALLS: Primary | ICD-10-CM

## 2024-04-17 PROCEDURE — 99308 SBSQ NF CARE LOW MDM 20: CPT

## 2024-04-17 NOTE — LETTER
"    4/17/2024        RE: Maribel Sevilla  [facility]  [address] [facility] [address]        [facility] GERIATRICS    Chief Complaint   Patient presents with     Nursing Home Acute     fall     HPI:  Maribel Sevilla is a 89 year old  (12/20/1931), who is being seen today for an episodic care visit at: [facility] () [[id number]]. Today's concern is: fall follow up     Patient had unwitnessed fall on 4/12.  She sustained bruising to the right side of the forehead improving now.  She is found laying in bed today.  History limited at baseline.  She does not recall anything about the stated fall.  Denies pain.    Allergies, and PMH/PSH reviewed in EPIC today.  REVIEW OF SYSTEMS:  Limited secondary to cognitive impairment but today pt reports no pain.     Objective:   BP 99/58   Pulse 75   Temp 97.5  F (36.4  C)   Resp 16   Ht 1.575 m (5' 2\")   Wt 55.3 kg (122 lb)   SpO2 93%   BMI 22.31 kg/m      Exam:  GENERAL APPEARANCE: NAD, in bed   RESPIRATORY: Clear to auscultation, even and unlabored, symmetrical chest wall expansion  CARDIOVASCULAR: RRR, no peripheral edema, S1/S2 normal   GASTROINTESTINAL: Soft, nontender, nondistended, bowel sounds positive all quadrants  INTEGUMENTARY: Right forehead bruising  NEUROLOGICAL: Alert to self, memory loss, confusion, impaired balance  PSYCHOLOGICAL: Cooperative, good eye contact    Recent labs in [facility] reviewed by me today.     Assessment/Plan:  Unwitnessed fall as above  -Neuro at baseline, right forehead bruising improving  -Fall precautions, PT as needed      MED REC REQUIRED  Post Medication Reconciliation Status: patient was not discharged from an inpatient facility or TCU         Electronically signed by:   Pinky Dawn,HARRY,APRN,AGNP-BC.             The above note was completed in part using Dragon voice recognition software. Although reviewed after completion, some word and grammatical errors may occur. Please contact the author " of this note with any questions.           Sincerely,        ASCENCION Galicia CNP

## 2024-04-17 NOTE — PROGRESS NOTES
"[facility] GERIATRICS    Chief Complaint   Patient presents with    Nursing Home Acute     fall     HPI:  Maribel Sevilla is a 89 year old  (12/20/1931), who is being seen today for an episodic care visit at: [facility] () [[id number]]. Today's concern is: fall follow up     Patient had unwitnessed fall on 4/12.  She sustained bruising to the right side of the forehead improving now.  She is found laying in bed today.  History limited at baseline.  She does not recall anything about the stated fall.  Denies pain.    Allergies, and PMH/PSH reviewed in [facility] today.  REVIEW OF SYSTEMS:  Limited secondary to cognitive impairment but today pt reports no pain.     Objective:   BP 99/58   Pulse 75   Temp 97.5  F (36.4  C)   Resp 16   Ht 1.575 m (5' 2\")   Wt 55.3 kg (122 lb)   SpO2 93%   BMI 22.31 kg/m      Exam:  GENERAL APPEARANCE: NAD, in bed   RESPIRATORY: Clear to auscultation, even and unlabored, symmetrical chest wall expansion  CARDIOVASCULAR: RRR, no peripheral edema, S1/S2 normal   GASTROINTESTINAL: Soft, nontender, nondistended, bowel sounds positive all quadrants  INTEGUMENTARY: Right forehead bruising  NEUROLOGICAL: Alert to self, memory loss, confusion, impaired balance  PSYCHOLOGICAL: Cooperative, good eye contact    Recent labs in [facility] reviewed by me today.     Assessment/Plan:  Unwitnessed fall as above  -Neuro at baseline, right forehead bruising improving  -Fall precautions, PT as needed      MED REC REQUIRED  Post Medication Reconciliation Status: patient was not discharged from an inpatient facility or TCU         Electronically signed by:   Pinky Dawn,DNP,APRN,AGNP-BC.             The above note was completed in part using Dragon voice recognition software. Although reviewed after completion, some word and grammatical errors may occur. Please contact the author of this note with any questions.         "

## 2024-04-22 ENCOUNTER — PATIENT OUTREACH (OUTPATIENT)
Dept: GERIATRIC MEDICINE | Facility: CLINIC | Age: 89
End: 2024-04-22
Payer: COMMERCIAL

## 2024-04-23 NOTE — PROGRESS NOTES
[facility] Six-Month Assessment    6 month assessment completed on 4/23/2024 with Member Quesada Lean RN.    ER visits: No  Hospitalizations: No  TCU stays: No  Significant health status changes: no changes  Falls/Injuries: Yes: 4/12/24 at [facility]  ADL/IADL changes: No    Reviewed Institutional Assessment and updated as needed.     Will see member in 6 months for an annual health risk assessment.   Encouraged member to call CC with any questions or concerns in the meantime.     FLAVIO Koch, RN, PHN, [facility]  Care Coordinator-Long Term Care  [address] [address]  [email or web address]   [email or web address]     Office: [phone]   Fax: [phone]

## 2024-05-07 ENCOUNTER — NURSING HOME VISIT (OUTPATIENT)
Dept: GERIATRICS | Facility: CLINIC | Age: 89
End: 2024-05-07
Payer: MEDICARE

## 2024-05-07 VITALS
DIASTOLIC BLOOD PRESSURE: 69 MMHG | HEART RATE: 90 BPM | TEMPERATURE: 98.4 F | WEIGHT: 122 LBS | RESPIRATION RATE: 18 BRPM | OXYGEN SATURATION: 95 % | HEIGHT: 62 IN | SYSTOLIC BLOOD PRESSURE: 117 MMHG | BODY MASS INDEX: 22.45 KG/M2

## 2024-05-07 DIAGNOSIS — G31.83 LEWY BODY DEMENTIA WITHOUT BEHAVIORAL DISTURBANCE (H): ICD-10-CM

## 2024-05-07 DIAGNOSIS — Z51.5 HOSPICE CARE PATIENT: Primary | ICD-10-CM

## 2024-05-07 DIAGNOSIS — F02.80 LEWY BODY DEMENTIA WITHOUT BEHAVIORAL DISTURBANCE (H): ICD-10-CM

## 2024-05-07 DIAGNOSIS — R29.6 RECURRENT FALLS: ICD-10-CM

## 2024-05-07 PROCEDURE — 99309 SBSQ NF CARE MODERATE MDM 30: CPT | Mod: GV

## 2024-05-07 NOTE — LETTER
"    5/7/2024        RE: Maribel Sevilla  [facility]  [address] [facility] [address]        [facility] GERIATRICS  Chief Complaint   Patient presents with     penitentiary Regulatory     [place] Medical Record Number:  [id number]  Place of Service where encounter took place:  [facility] () [[id number]]    HPI:    Maribel Sevilla  is 89 year old (12/20/1931), who is being seen today for a federally mandated E/M visit. Today's concerns are: Routine LTC follow-up    Seen and examined in bed.  History limited at baseline due to underlying cognitive impairment.  Reports\" I do not feel good\".  Pt unable to elaborate more on this.  Patient in hospice due to advanced dementia.  Appears comfortable in bed.  No signs of pain or discomfort.  Course reviewed with staff and no acute concerns.    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  FAMILY HISTORY: family history includes C.A.D. in her brother, father, mother, and sister; Psychotic Disorder in her son.  SOCIAL HISTORY:  reports that she has never smoked. She has never used smokeless tobacco. She reports that she does not drink alcohol and does not use drugs.    MEDICATIONS:  MED REC REQUIRED  Post Medication Reconciliation Status: patient was not discharged from an inpatient facility or TCU       Review of your medicines            Accurate as of May 7, 2024 11:59 PM. If you have any questions, ask your nurse or doctor.                CONTINUE these medicines which have NOT CHANGED        Dose / Directions "   ACE/ARB/ARNI NOT PRESCRIBED  Commonly known as: INTENTIONAL  Used for: CKD (chronic kidney disease) stage 4, GFR 15-29 ml/min (H)      Please choose reason not prescribed from choices below.  Refills: 0     acetaminophen 500 MG tablet  Commonly known as: TYLENOL      Dose: 1,000 mg  Take 1,000 mg by mouth 3 times daily and 1 tab every 4 hours as needed  Refills: 0     ferrous sulfate 325 (65 Fe) MG tablet  Commonly known as: FEROSUL  Used for: Iron deficiency anemia, unspecified iron deficiency anemia type      Dose: 325 mg  Take 1 tablet (325 mg) by mouth daily (with breakfast)  Refills: 0     hyoscyamine 0.125 MG sublingual tablet  Commonly known as: LEVSIN/SL      Dose: 0.125 mg  Place 1 tablet (0.125 mg) under the tongue every 4 hours as needed for cramping  Refills: 0     lamoTRIgine 200 MG tablet  Commonly known as: LaMICtal  Used for: Lewy body dementia without behavioral disturbance (H)      Dose: 200 mg  Take 1 tablet (200 mg) by mouth every morning  Refills: 0     ondansetron 4 MG tablet  Commonly known as: ZOFRAN      Dose: 4 mg  Take 4 mg by mouth 4 times daily  Refills: 0     oxyCODONE 5 MG tablet  Commonly known as: ROXICODONE      Dose: 5 mg  Place 1 tablet (5 mg) under the tongue every hour as needed for pain  Refills: 0     pantoprazole 40 MG EC tablet  Commonly known as: PROTONIX      Dose: 40 mg  Take 40 mg by mouth daily  Refills: 0     polyethylene glycol 17 g packet  Commonly known as: MIRALAX      Dose: 1 packet  Take 1 packet by mouth daily  Refills: 0     psyllium 58.6 % powder  Commonly known as: METAMUCIL/KONSYL      Dose: 1 Tablespoonful  Take 18 g (1 Tablespoonful) by mouth daily  Refills: 0     QUEtiapine 50 MG tablet  Commonly known as: SEROquel      Dose: 200 mg  Take 200 mg by mouth At Bedtime  Refills: 0     senna-docusate 8.6-50 MG tablet  Commonly known as: SENOKOT-S/PERICOLACE      Dose: 1 tablet  Take 1 tablet by mouth daily May also have 2 tablets po BID PRN  Refills: 0    "  sertraline 100 MG tablet  Commonly known as: Zoloft  Used for: Lewy body dementia without behavioral disturbance (H)      Dose: 100 mg  Take 1 tablet (100 mg) by mouth daily  Refills: 0            Case Management:  I have reviewed the care plan and MDS and do agree with the plan. Patient's desire to return to the community is not assessible due to cognitive impairment. Information reviewed:  Medications, vital signs, orders, and nursing notes.    ROS:  Limited secondary to aphasia impairment but today pt reports \"I don't feel well\".     Vitals:  /69   Pulse 90   Temp 98.4  F (36.9  C)   Resp 18   Ht 1.575 m (5' 2\")   Wt 55.3 kg (122 lb)   SpO2 95%   BMI 22.31 kg/m    Body mass index is 22.31 kg/m .    Exam:  GENERAL APPEARANCE: NAD  RESPIRATORY: Clear to auscultation, even and unlabored, symmetrical chest wall expansion  CARDIOVASCULAR: RRR, no peripheral edema, S1/S2 normal   GASTROINTESTINAL: Soft, nontender, nondistended, bowel sounds positive all quadrants  NEUROLOGICAL: Alert to self, memory loss, confusion, impaired balance  PSYCHOLOGICAL: Calm     Lab/Diagnostic data:   Patient is on hospice/palliative care and labs are not recommended    ASSESSMENT/PLAN  Hospice patient  Lewy body dementia  Bipolar disorder  Depression  -Stable mental and functional status  -Continue lamotrigine, seroquel and sertraline, monitor for changes in mood, behavior, and functional status, ACP follow up      -Repeated falls  -Complicated by underlying cognitive impairment  -Fall precaution              Electronically signed by:  Pinky Dawn DNP,APRN,AGNP-BC.               The above note was completed in part using Dragon voice recognition software. Although reviewed after completion, some word and grammatical errors may occur. Please contact the author of this note with any questions.             Sincerely,        ASCENCION Galicia CNP      "

## 2024-05-07 NOTE — PROGRESS NOTES
"[facility] GERIATRICS  Chief Complaint   Patient presents with    long-term Regulatory     [place] Medical Record Number:  [id number]  Place of Service where encounter took place:  [facility] () [[id number]]    HPI:    Maribel Sevilla  is 89 year old (12/20/1931), who is being seen today for a federally mandated E/M visit. Today's concerns are: Routine LTC follow-up    Seen and examined in bed.  History limited at baseline due to underlying cognitive impairment.  Reports\" I do not feel good\".  Pt unable to elaborate more on this.  Patient in hospice due to advanced dementia.  Appears comfortable in bed.  No signs of pain or discomfort.  Course reviewed with staff and no acute concerns.    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  FAMILY HISTORY: family history includes C.A.D. in her brother, father, mother, and sister; Psychotic Disorder in her son.  SOCIAL HISTORY:  reports that she has never smoked. She has never used smokeless tobacco. She reports that she does not drink alcohol and does not use drugs.    MEDICATIONS:  MED REC REQUIRED  Post Medication Reconciliation Status: patient was not discharged from an inpatient facility or TCU       Review of your medicines            Accurate as of May 7, 2024 11:59 PM. If you have any questions, ask your nurse or doctor.                CONTINUE these medicines which have NOT CHANGED        Dose / Directions   ACE/ARB/ARNI NOT PRESCRIBED  Commonly known as: INTENTIONAL  Used for: CKD (chronic kidney disease) stage 4, GFR 15-29 ml/min (H)      Please " choose reason not prescribed from choices below.  Refills: 0     acetaminophen 500 MG tablet  Commonly known as: TYLENOL      Dose: 1,000 mg  Take 1,000 mg by mouth 3 times daily and 1 tab every 4 hours as needed  Refills: 0     ferrous sulfate 325 (65 Fe) MG tablet  Commonly known as: FEROSUL  Used for: Iron deficiency anemia, unspecified iron deficiency anemia type      Dose: 325 mg  Take 1 tablet (325 mg) by mouth daily (with breakfast)  Refills: 0     hyoscyamine 0.125 MG sublingual tablet  Commonly known as: LEVSIN/SL      Dose: 0.125 mg  Place 1 tablet (0.125 mg) under the tongue every 4 hours as needed for cramping  Refills: 0     lamoTRIgine 200 MG tablet  Commonly known as: LaMICtal  Used for: Lewy body dementia without behavioral disturbance (H)      Dose: 200 mg  Take 1 tablet (200 mg) by mouth every morning  Refills: 0     ondansetron 4 MG tablet  Commonly known as: ZOFRAN      Dose: 4 mg  Take 4 mg by mouth 4 times daily  Refills: 0     oxyCODONE 5 MG tablet  Commonly known as: ROXICODONE      Dose: 5 mg  Place 1 tablet (5 mg) under the tongue every hour as needed for pain  Refills: 0     pantoprazole 40 MG EC tablet  Commonly known as: PROTONIX      Dose: 40 mg  Take 40 mg by mouth daily  Refills: 0     polyethylene glycol 17 g packet  Commonly known as: MIRALAX      Dose: 1 packet  Take 1 packet by mouth daily  Refills: 0     psyllium 58.6 % powder  Commonly known as: METAMUCIL/KONSYL      Dose: 1 Tablespoonful  Take 18 g (1 Tablespoonful) by mouth daily  Refills: 0     QUEtiapine 50 MG tablet  Commonly known as: SEROquel      Dose: 200 mg  Take 200 mg by mouth At Bedtime  Refills: 0     senna-docusate 8.6-50 MG tablet  Commonly known as: SENOKOT-S/PERICOLACE      Dose: 1 tablet  Take 1 tablet by mouth daily May also have 2 tablets po BID PRN  Refills: 0     sertraline 100 MG tablet  Commonly known as: Zoloft  Used for: Lewy body dementia without behavioral disturbance (H)      Dose: 100 mg  Take 1  "tablet (100 mg) by mouth daily  Refills: 0            Case Management:  I have reviewed the care plan and MDS and do agree with the plan. Patient's desire to return to the community is not assessible due to cognitive impairment. Information reviewed:  Medications, vital signs, orders, and nursing notes.    ROS:  Limited secondary to aphasia impairment but today pt reports \"I don't feel well\".     Vitals:  /69   Pulse 90   Temp 98.4  F (36.9  C)   Resp 18   Ht 1.575 m (5' 2\")   Wt 55.3 kg (122 lb)   SpO2 95%   BMI 22.31 kg/m    Body mass index is 22.31 kg/m .    Exam:  GENERAL APPEARANCE: NAD  RESPIRATORY: Clear to auscultation, even and unlabored, symmetrical chest wall expansion  CARDIOVASCULAR: RRR, no peripheral edema, S1/S2 normal   GASTROINTESTINAL: Soft, nontender, nondistended, bowel sounds positive all quadrants  NEUROLOGICAL: Alert to self, memory loss, confusion, impaired balance  PSYCHOLOGICAL: Calm     Lab/Diagnostic data:   Patient is on hospice/palliative care and labs are not recommended    ASSESSMENT/PLAN  Hospice patient  Lewy body dementia  Bipolar disorder  Depression  -Stable mental and functional status  -Continue lamotrigine, seroquel and sertraline, monitor for changes in mood, behavior, and functional status, ACP follow up      -Repeated falls  -Complicated by underlying cognitive impairment  -Fall precaution              Electronically signed by:  Pinky Dawn,HARRY,APRN,AGNP-BC.               The above note was completed in part using Dragon voice recognition software. Although reviewed after completion, some word and grammatical errors may occur. Please contact the author of this note with any questions.           "

## 2024-05-12 RX ORDER — OXYCODONE HYDROCHLORIDE 5 MG/1
5 TABLET ORAL
COMMUNITY
Start: 2024-05-12

## 2024-07-03 ENCOUNTER — NURSING HOME VISIT (OUTPATIENT)
Dept: GERIATRICS | Facility: CLINIC | Age: 89
End: 2024-07-03
Payer: OTHER MISCELLANEOUS

## 2024-07-03 VITALS
RESPIRATION RATE: 17 BRPM | DIASTOLIC BLOOD PRESSURE: 69 MMHG | HEIGHT: 62 IN | HEART RATE: 74 BPM | OXYGEN SATURATION: 92 % | BODY MASS INDEX: 20.8 KG/M2 | WEIGHT: 113 LBS | SYSTOLIC BLOOD PRESSURE: 96 MMHG | TEMPERATURE: 98.7 F

## 2024-07-03 DIAGNOSIS — F31.9 BIPOLAR AFFECTIVE DISORDER, REMISSION STATUS UNSPECIFIED (H): Primary | ICD-10-CM

## 2024-07-03 DIAGNOSIS — F02.818 LEWY BODY DEMENTIA WITH BEHAVIORAL DISTURBANCE (H): ICD-10-CM

## 2024-07-03 DIAGNOSIS — Z51.5 HOSPICE CARE PATIENT: ICD-10-CM

## 2024-07-03 DIAGNOSIS — G31.83 LEWY BODY DEMENTIA WITH BEHAVIORAL DISTURBANCE (H): ICD-10-CM

## 2024-07-03 DIAGNOSIS — N18.4 CKD (CHRONIC KIDNEY DISEASE) STAGE 4, GFR 15-29 ML/MIN (H): ICD-10-CM

## 2024-07-03 PROCEDURE — 99309 SBSQ NF CARE MODERATE MDM 30: CPT | Performed by: INTERNAL MEDICINE

## 2024-07-03 NOTE — LETTER
7/3/2024      Maribel Sevilla  [facility]  [address] [facility] [address]        [facility] GERIATRICS  REGULATORY VISIT  July 3, 2024      [facility] Medical Record Number:  [id number]  Place of Service where encounter took place:  [facility] () [[id number]]    Chief Complaint   Patient presents with     skilled nursing Regulatory       HPI:    Maribel Sevilla is a 89 year old  (12/20/1931), who is being seen today for a federally mandated E/M visit at [facility] where she has resided since October 2011. HPI information obtained from: facility chart records, facility staff, patient report, and [facility] chart review.    Today, Ms. Sevilla is seen in her room sitting up in bed. She was trying to eat her oatmeal/milk with a fork and couldn't quite figure out why it was not working. Traded out the fork for the spoon and she was then able to eat, but commented that it stopped leaking out. She is enrolled with hospice. Limited historian, but denies any aches or pains today. Talked with  nursing, no acute concerns today -- she has a cyclical pattern where she eats less, then will eat more again.     ALLERGIES:    Allergies   Allergen Reactions     No Known Allergies         Past Medical, Surgical, Family and Social History: Reviewed and updated in EPIC.    MEDICATIONS:  Current Outpatient Medications   Medication Sig Dispense Refill     ACE/ARB/ARNI NOT PRESCRIBED (INTENTIONAL) Please choose reason not prescribed from choices below.       acetaminophen (TYLENOL) 500 MG tablet Take 1,000 mg by mouth 3 times daily and 1 tab every 4 hours as needed       hyoscyamine (LEVSIN/SL) 0.125 MG sublingual tablet Place 1 tablet (0.125 mg) under the tongue every 4 hours as needed for cramping       lamoTRIgine (LAMICTAL) 200 MG tablet Take 1 tablet (200 mg) by mouth every morning       NEW MED Take 1 capsule by mouth 2 times daily [facility] (family provides)    "    ondansetron (ZOFRAN) 4 MG tablet Take 4 mg by mouth 4 times daily        oxyCODONE (ROXICODONE) 5 MG tablet Place 1 tablet (5 mg) under the tongue every hour as needed for pain       pantoprazole (PROTONIX) 40 MG EC tablet Take 40 mg by mouth daily        polyethylene glycol (MIRALAX) 17 g packet Take 1 packet by mouth daily       psyllium (METAMUCIL/KONSYL) 58.6 % powder Take 18 g (1 Tablespoonful) by mouth daily       QUEtiapine (SEROQUEL) 50 MG tablet Take 200 mg by mouth At Bedtime        senna-docusate (SENOKOT-S/PERICOLACE) 8.6-50 MG tablet Take 1 tablet by mouth daily May also have 2 tablets po BID PRN       sertraline (ZOLOFT) 100 MG tablet Take 1 tablet (100 mg) by mouth daily (Patient taking differently: Take 150 mg by mouth daily)       Medications reviewed:  Medications reconciled to facility chart and changes were made to reflect current medications as identified as above med list. Below are the changes that were made:   Medications stopped since last EPIC medication reconciliation:   Medications Discontinued During This Encounter   Medication Reason     ferrous sulfate (FEROSUL) 325 (65 Fe) MG tablet Med Rec(No AVS / No eCancel)     Medications started since last [facility] medication reconciliation:  Orders Placed This Encounter   Medications     NEW MED     Sig: Take 1 capsule by mouth 2 times daily [facility] (family provides)       REVIEW OF SYSTEMS:  Unable to be obtained due to cognitive impairment or aphasia.     PHYSICAL EXAM:  BP 96/69   Pulse 74   Temp 98.7  F (37.1  C)   Resp 17   Ht 1.575 m (5' 2\")   Wt 51.3 kg (113 lb)   SpO2 92%   BMI 20.67 kg/m    Gen: sitting up in bed, alert, cooperative and in no acute distress  Resp: breathing non labored, no tachypnea  Ext: decreased muscle mass  Neuro: CX II-XII grossly in tact; ROM in all four extremities grossly in tact  Psych: memory, judgement and insight impaired    LABS/IMAGING: Reviewed as per Epic and/or " CareEverywhere    ASSESSMENT / PLAN:    Bipolar Disorder  Lewy Body Dementia  Hospice Care Patient  Ongoing cyclical pattern to her mood and diet. Weight down about 15 lbs from a year ago.   -- lamotrigine 200 mg at bedtime and sertraline 150 mg daily  -- ongoing 24/7 nursing and supportive cares    CKD, Stage IV   Baseline Cr low 3s -  most recent in Feb (is in Matrix). Volume status is fine - no edema.  -- avoid nephrotoxic agents  -- BMP per goals of care - is enrolled in hospice      Electronically signed by  Felecia Elizabeth MD              Sincerely,        Felecia Elizabeth MD

## 2024-07-03 NOTE — PROGRESS NOTES
[facility] GERIATRICS  REGULATORY VISIT  July 3, 2024      [facility] Medical Record Number:  [id number]  Place of Service where encounter took place:  [facility] () [[id number]]    Chief Complaint   Patient presents with    residential Regulatory       HPI:    Maribel Sevilla is a 89 year old  (12/20/1931), who is being seen today for a federally mandated E/M visit at [facility] where she has resided since October 2011. HPI information obtained from: facility chart records, facility staff, patient report, and [facility] chart review.    Today, Ms. Sevilla is seen in her room sitting up in bed. She was trying to eat her oatmeal/milk with a fork and couldn't quite figure out why it was not working. Traded out the fork for the spoon and she was then able to eat, but commented that it stopped leaking out. She is enrolled with hospice. Limited historian, but denies any aches or pains today. Talked with  nursing, no acute concerns today -- she has a cyclical pattern where she eats less, then will eat more again.     ALLERGIES:    Allergies   Allergen Reactions    No Known Allergies         Past Medical, Surgical, Family and Social History: Reviewed and updated in EPIC.    MEDICATIONS:  Current Outpatient Medications   Medication Sig Dispense Refill    ACE/ARB/ARNI NOT PRESCRIBED (INTENTIONAL) Please choose reason not prescribed from choices below.      acetaminophen (TYLENOL) 500 MG tablet Take 1,000 mg by mouth 3 times daily and 1 tab every 4 hours as needed      hyoscyamine (LEVSIN/SL) 0.125 MG sublingual tablet Place 1 tablet (0.125 mg) under the tongue every 4 hours as needed for cramping      lamoTRIgine (LAMICTAL) 200 MG tablet Take 1 tablet (200 mg) by mouth every morning      NEW MED Take 1 capsule by mouth 2 times daily [facility] (family provides)      ondansetron (ZOFRAN) 4 MG tablet Take 4 mg by mouth 4 times daily       oxyCODONE (ROXICODONE) 5 MG tablet Place 1  "tablet (5 mg) under the tongue every hour as needed for pain      pantoprazole (PROTONIX) 40 MG EC tablet Take 40 mg by mouth daily       polyethylene glycol (MIRALAX) 17 g packet Take 1 packet by mouth daily      psyllium (METAMUCIL/KONSYL) 58.6 % powder Take 18 g (1 Tablespoonful) by mouth daily      QUEtiapine (SEROQUEL) 50 MG tablet Take 200 mg by mouth At Bedtime       senna-docusate (SENOKOT-S/PERICOLACE) 8.6-50 MG tablet Take 1 tablet by mouth daily May also have 2 tablets po BID PRN      sertraline (ZOLOFT) 100 MG tablet Take 1 tablet (100 mg) by mouth daily (Patient taking differently: Take 150 mg by mouth daily)       Medications reviewed:  Medications reconciled to facility chart and changes were made to reflect current medications as identified as above med list. Below are the changes that were made:   Medications stopped since last EPIC medication reconciliation:   Medications Discontinued During This Encounter   Medication Reason    ferrous sulfate (FEROSUL) 325 (65 Fe) MG tablet Med Rec(No AVS / No eCancel)     Medications started since last [facility] medication reconciliation:  Orders Placed This Encounter   Medications    NEW MED     Sig: Take 1 capsule by mouth 2 times daily [facility] (family provides)       REVIEW OF SYSTEMS:  Unable to be obtained due to cognitive impairment or aphasia.     PHYSICAL EXAM:  BP 96/69   Pulse 74   Temp 98.7  F (37.1  C)   Resp 17   Ht 1.575 m (5' 2\")   Wt 51.3 kg (113 lb)   SpO2 92%   BMI 20.67 kg/m    Gen: sitting up in bed, alert, cooperative and in no acute distress  Resp: breathing non labored, no tachypnea  Ext: decreased muscle mass  Neuro: CX II-XII grossly in tact; ROM in all four extremities grossly in tact  Psych: memory, judgement and insight impaired    LABS/IMAGING: Reviewed as per Epic and/or [facility]    ASSESSMENT / PLAN:    Bipolar Disorder  Lewy Body Dementia  Hospice Care Patient  Ongoing cyclical pattern to her mood and diet. " Weight down about 15 lbs from a year ago.   -- lamotrigine 200 mg at bedtime and sertraline 150 mg daily  -- ongoing 24/7 nursing and supportive cares    CKD, Stage IV   Baseline Cr low 3s -  most recent in Feb (is in Matrix). Volume status is fine - no edema.  -- avoid nephrotoxic agents  -- BMP per goals of care - is enrolled in hospice      Electronically signed by  Felecia Elizabeth MD

## 2024-07-08 PROBLEM — D62 ACUTE BLOOD LOSS ANEMIA: Status: ACTIVE | Noted: 2018-07-29

## 2024-07-08 PROBLEM — R42 POSTURAL DIZZINESS WITH PRESYNCOPE: Status: ACTIVE | Noted: 2018-07-29

## 2024-07-08 PROBLEM — R55 POSTURAL DIZZINESS WITH PRESYNCOPE: Status: ACTIVE | Noted: 2018-07-29

## 2024-07-22 ENCOUNTER — PATIENT OUTREACH (OUTPATIENT)
Dept: GERIATRIC MEDICINE | Facility: CLINIC | Age: 89
End: 2024-07-22
Payer: COMMERCIAL

## 2024-07-23 ENCOUNTER — PATIENT OUTREACH (OUTPATIENT)
Dept: GERIATRIC MEDICINE | Facility: CLINIC | Age: 89
End: 2024-07-23
Payer: COMMERCIAL

## 2024-07-23 ASSESSMENT — PATIENT HEALTH QUESTIONNAIRE - PHQ9: SUM OF ALL RESPONSES TO PHQ QUESTIONS 1-9: 2

## 2024-07-23 NOTE — PROGRESS NOTES
[facility] Care Coordination Contact    Received after visit chart from care coordinator.  Completed following tasks: Mailed [facility] POC Letter to member/rep with Support Plan & Signature Page    Sandi Jones  Care Management Specialist  [facility]  [phone]

## 2024-07-23 NOTE — PROGRESS NOTES
"[facility] Care Coordination Contact  [facility] Institutional Assessment     Institutional Assessment for Health Risk Assessment with Maribel Sevilla completed on July 23, 2024 at [facility]    Type of residence:: Nursing home  Current living arrangement:: I live in a nursing home     Assessment completed with:: Patient, Care Team Member Claus Rubi and Felecia Pablo LSW, Children dtr Mari    Mental/Behavioral Health   Depression Screening:      PHQ-9 Total Score: 2    Mental health DX:: Yes (bipolar)   Mental health DX how managed:: Medication    Falls Assessment:   Fallen 2 or more times in the past year?: No   Any fall with injury in the past year?: No    ADL/IADL Dependencies:   Dependent ADLs:: Bathing, Dressing, Grooming, Incontinence, Transfers, Wheelchair-with assist, Toileting  Dependent IADLs:: Cleaning, Cooking, Laundry, Shopping, Meal Preparation, Medication Management, Money Management, Transportation, Incontinence      Care Plan & Recommendations: member has been on Hospice since Feb. 24. Has diagnosis of ESRD but has no obvious symptoms and is not on dialysis. Has lost 15 lb this year. Appetite varies. Is [place] but is able to understand at conversation level. Daughter said lucidity varies and after telling her daughter to leave, later calls her daughter with no recollection that she had been there. Has had difficulty using utensils, trying to \"eat\" milk with a fork. Enjoys listening to jazz, does not watch TV and will attend some of the activities offered.   Discussed options/opportunities for transitions.    See Institutional Care Plan for detailed assessment information.    Obtained a copy of the facility care plan and MDS from facility electronic records. Requested of detention social worker to put this care coordinator on care conference attendee list.    Placed the Health Plan facility face sheet in the member's facility chart.    Follow-Up Plan: Member informed of " future contact, plan to f/u with member with a 6 month assessment, attend 1 care conference annually, and will follow any hospitalizations or transitions. Care Coordinator contact information shared with member/family and facility, and encouraged to call this care coordinator with any questions or concerns at any time.     [place] care continuum providers: Please see Snapshot and Care Management Flowsheets for Specific details of care plan.    This CC note routed to PCP, Bri Wiley.    Veronica Barton RN  [facility]  [phone]

## 2024-07-23 NOTE — LETTER
July 23, 2024      Maribel Sevilla  C/O Mari Sevilla  [address] [place] [address]      Dear Maribel:    At [facility], we re dedicated to improving your health and wellness. Enclosed is the Care Plan developed with you on 7/22/2024. Please review the Care Plan carefully.    As a reminder, during your visit we talked about:  Ways to manage your physical and mental health  Using health care to maintain and improve your health   Your preventive care needs     Remember to contact your care coordinator if you:  Are hospitalized, or plan to be hospitalized   Have a fall    Have a change in your physical or mental health  Need help finding support or services    If you have questions, or don t agree with your Care Plan, call me at [phone]. You can also call me if your needs change. TTY users, call the [place] Relay at (335) or [phone] (hkjbrq-jz-ngfhwo relay service).    Sincerely,    [email or web address]  Phone: [phone]      [facility] ([facility]) is a health plan that contracts with both Medicare and the [place] Medical Assistance (Medicaid) program to provide benefits of both programs to enrollees. Enrollment in [facility] depends on contract renewal.    [id number] accepted    [id number] (07/2022)

## 2024-09-03 ENCOUNTER — PATIENT OUTREACH (OUTPATIENT)
Dept: GERIATRIC MEDICINE | Facility: CLINIC | Age: 89
End: 2024-09-03
Payer: COMMERCIAL

## 2024-09-03 NOTE — PROGRESS NOTES
[facility] Care Coordination Contact    Internal CC change effective 9/1/2024.  Mailed member CC Change letter.  Additional tasks to be completed by CMS include: update database & EPIC, and move member file.    Cecille Waldron  Case Management Specialist   [facility]  [phone]

## 2024-09-03 NOTE — LETTER
September 3, 2024    MIS RAI  [facility]  [address] [facility] [address]      Dear Mis:    As a member of [facility] ([facility] ([facility]), you are provided a care coordinator. I will be your new care coordinator as of 9/1/2024. I will be calling you soon to see how you are doing and determine your needs.    If you have any questions, please feel free to call me at [phone]. If you reach my voice mail, please leave a message and your phone number. If you are hearing impaired, please call the [place] Relay at 605 or [phone] (vqgsvs-pk-cnncnn relay service).    I look forward to speaking with you soon.    Sincerely,    LAWSON Teague, [employer] [facility]    E-mail: [email or web address]  Phone: [phone]      [facility] is a health plan that contracts with both Medicare and the [place] Medical Assistance (Medicaid) program to provide benefits of both programs to enrollees. Enrollment in [facility] depends on contract renewal.      [facility]  [id number] DHS Approved ([id number])  [id number] (11/18)

## 2024-09-17 ENCOUNTER — NURSING HOME VISIT (OUTPATIENT)
Dept: GERIATRICS | Facility: CLINIC | Age: 89
End: 2024-09-17
Payer: OTHER MISCELLANEOUS

## 2024-09-17 VITALS
HEART RATE: 85 BPM | OXYGEN SATURATION: 94 % | DIASTOLIC BLOOD PRESSURE: 78 MMHG | BODY MASS INDEX: 19.51 KG/M2 | TEMPERATURE: 98.1 F | RESPIRATION RATE: 16 BRPM | HEIGHT: 62 IN | WEIGHT: 106 LBS | SYSTOLIC BLOOD PRESSURE: 117 MMHG

## 2024-09-17 DIAGNOSIS — G31.83 LEWY BODY DEMENTIA WITH BEHAVIORAL DISTURBANCE (H): Primary | ICD-10-CM

## 2024-09-17 DIAGNOSIS — F31.9 BIPOLAR AFFECTIVE DISORDER, REMISSION STATUS UNSPECIFIED (H): ICD-10-CM

## 2024-09-17 DIAGNOSIS — Z51.5 HOSPICE CARE PATIENT: ICD-10-CM

## 2024-09-17 DIAGNOSIS — F02.818 LEWY BODY DEMENTIA WITH BEHAVIORAL DISTURBANCE (H): Primary | ICD-10-CM

## 2024-09-17 PROCEDURE — 99309 SBSQ NF CARE MODERATE MDM 30: CPT | Mod: GV | Performed by: INTERNAL MEDICINE

## 2024-09-17 NOTE — PROGRESS NOTES
[facility] GERIATRICS  REGULATORY VISIT  September 17, 2024    [facility] Medical Record Number:  [id number]  Place of Service where encounter took place:  [facility] () [[id number]]    Chief Complaint   Patient presents with    correction Regulatory       HPI:    Maribel Sevilla is a 89 year old  (12/20/1931), who is being seen today for a federally mandated E/M visit. at [facility] where she has resided since October 2011. She is enrolled with [facility] hospice.      Today, Ms. Sevilla is seen in her room sitting up in bed. She was visiting with the hospice team. No acute concerns today per nursing.      ALLERGIES:  No Known Allergies     Past Medical, Surgical, Family and Social History: Reviewed and updated in EPIC.    MEDICATIONS:  Current Outpatient Medications   Medication Sig Dispense Refill    ACE/ARB/ARNI NOT PRESCRIBED (INTENTIONAL) Please choose reason not prescribed from choices below.      hyoscyamine (LEVSIN/SL) 0.125 MG sublingual tablet Place 1 tablet (0.125 mg) under the tongue every 4 hours as needed for cramping      lamoTRIgine (LAMICTAL) 200 MG tablet Take 1 tablet (200 mg) by mouth every morning      NEW MED Take 1 capsule by mouth 2 times daily [facility] (family provides)      oxyCODONE (ROXICODONE) 5 MG tablet Place 1 tablet (5 mg) under the tongue every hour as needed for pain      pantoprazole (PROTONIX) 40 MG EC tablet Take 40 mg by mouth daily       polyethylene glycol (MIRALAX) 17 g packet Take 1 packet by mouth daily      psyllium (METAMUCIL/KONSYL) 58.6 % powder Take 18 g (1 Tablespoonful) by mouth daily      QUEtiapine (SEROQUEL) 50 MG tablet Take 200 mg by mouth At Bedtime       senna-docusate (SENOKOT-S/PERICOLACE) 8.6-50 MG tablet Take 1 tablet by mouth daily May also have 2 tablets po BID PRN      sertraline (ZOLOFT) 100 MG tablet Take 1 tablet (100 mg) by mouth daily (Patient taking differently: Take 150 mg by mouth daily.)       "acetaminophen (TYLENOL) 500 MG tablet Take 1,000 mg by mouth 3 times daily and 1 tab every 4 hours as needed       Medications reviewed:  Medications reconciled to facility chart and changes were made to reflect current medications as identified as above med list. Below are the changes that were made:   Medications stopped since last EPIC medication reconciliation:   Medications Discontinued During This Encounter   Medication Reason    ondansetron (ZOFRAN) 4 MG tablet Med Rec(No AVS / No eCancel)     Medications started since last [facility] medication reconciliation:  No orders of the defined types were placed in this encounter.    REVIEW OF SYSTEMS:  Unable to be obtained due to cognitive impairment or aphasia.     PHYSICAL EXAM:  /78   Pulse 85   Temp 98.1  F (36.7  C)   Resp 16   Ht 1.575 m (5' 2\")   Wt 48.1 kg (106 lb)   SpO2 94%   BMI 19.39 kg/m    Gen: sitting up in bed, alert, and in no acute distress  Resp: breathing non labored, no tachypnea  Neuro: CX II-XII grossly in tact; ROM in all four extremities grossly in tact  Psych: memory, judgement and insight impaired     LABS/IMAGING: Reviewed as per Epic and/or [facility]    ASSESSMENT / PLAN:    Bipolar Disorder  Lewy Body Dementia  Hospice Care Patient  Ongoing cyclical pattern to her mood and diet. Weight down about 17 lbs from a year ago.   -- lamotrigine 200 mg at bedtime, quetiapine 200 mg at bedtime and sertraline 150 mg daily  -- ongoing 24/7 nursing and supportive cares  -- appreciate the cares of the hospice team        Electronically signed by  Felecia Elizabeth MD          "

## 2024-09-17 NOTE — LETTER
9/17/2024      Maribel Sevilla  [facility]  [address] [facility] [address]        [facility] GERIATRICS  REGULATORY VISIT  September 17, 2024    [facility] Medical Record Number:  [id number]  Place of Service where encounter took place:  [facility] () [[id number]]    Chief Complaint   Patient presents with     FPC Regulatory       HPI:    Maribel Sevilla is a 89 year old  (12/20/1931), who is being seen today for a federally mandated E/M visit. at [facility] where she has resided since October 2011. She is enrolled with [facility] hospice.      Today, Ms. Sevilla is seen in her room sitting up in bed. She was visiting with the hospice team. No acute concerns today per nursing.      ALLERGIES:  No Known Allergies     Past Medical, Surgical, Family and Social History: Reviewed and updated in EPIC.    MEDICATIONS:  Current Outpatient Medications   Medication Sig Dispense Refill     ACE/ARB/ARNI NOT PRESCRIBED (INTENTIONAL) Please choose reason not prescribed from choices below.       hyoscyamine (LEVSIN/SL) 0.125 MG sublingual tablet Place 1 tablet (0.125 mg) under the tongue every 4 hours as needed for cramping       lamoTRIgine (LAMICTAL) 200 MG tablet Take 1 tablet (200 mg) by mouth every morning       NEW MED Take 1 capsule by mouth 2 times daily [facility] (family provides)       oxyCODONE (ROXICODONE) 5 MG tablet Place 1 tablet (5 mg) under the tongue every hour as needed for pain       pantoprazole (PROTONIX) 40 MG EC tablet Take 40 mg by mouth daily        polyethylene glycol (MIRALAX) 17 g packet Take 1 packet by mouth daily       psyllium (METAMUCIL/KONSYL) 58.6 % powder Take 18 g (1 Tablespoonful) by mouth daily       QUEtiapine (SEROQUEL) 50 MG tablet Take 200 mg by mouth At Bedtime        senna-docusate (SENOKOT-S/PERICOLACE) 8.6-50 MG tablet Take 1 tablet by mouth daily May also have 2 tablets po BID PRN       sertraline (ZOLOFT) 100 MG  "tablet Take 1 tablet (100 mg) by mouth daily (Patient taking differently: Take 150 mg by mouth daily.)       acetaminophen (TYLENOL) 500 MG tablet Take 1,000 mg by mouth 3 times daily and 1 tab every 4 hours as needed       Medications reviewed:  Medications reconciled to facility chart and changes were made to reflect current medications as identified as above med list. Below are the changes that were made:   Medications stopped since last EPIC medication reconciliation:   Medications Discontinued During This Encounter   Medication Reason     ondansetron (ZOFRAN) 4 MG tablet Med Rec(No AVS / No eCancel)     Medications started since last [facility] medication reconciliation:  No orders of the defined types were placed in this encounter.    REVIEW OF SYSTEMS:  Unable to be obtained due to cognitive impairment or aphasia.     PHYSICAL EXAM:  /78   Pulse 85   Temp 98.1  F (36.7  C)   Resp 16   Ht 1.575 m (5' 2\")   Wt 48.1 kg (106 lb)   SpO2 94%   BMI 19.39 kg/m    Gen: sitting up in bed, alert, and in no acute distress  Resp: breathing non labored, no tachypnea  Neuro: CX II-XII grossly in tact; ROM in all four extremities grossly in tact  Psych: memory, judgement and insight impaired     LABS/IMAGING: [facility] Epic [facility]    ASSESSMENT / PLAN:    Bipolar Disorder  Lewy Body Dementia  Hospice Care Patient  Ongoing cyclical pattern to her mood and diet. Weight down about 17 lbs from a year ago.   -- lamotrigine 200 mg at bedtime, quetiapine 200 mg at bedtime and sertraline 150 mg daily  -- ongoing 24/7 nursing and supportive cares  -- appreciate the cares of the hospice team        Electronically signed by  Felecia Elizabeth MD              Sincerely,        Felecia Elizabeth MD      "

## 2024-11-03 RX ORDER — LORAZEPAM 0.5 MG/1
0.5 TABLET ORAL EVERY 4 HOURS PRN
COMMUNITY

## 2024-11-03 RX ORDER — OXYCODONE HYDROCHLORIDE 5 MG/1
5 TABLET, COATED ORAL
COMMUNITY

## 2024-11-03 NOTE — PROGRESS NOTES
[facility] GERIATRICS  REGULATORY VISIT  November 4, 2024      [facility] Medical Record Number:  [id number]  Place of Service where encounter took place:  [facility] () [[id number]]    Chief Complaint   Patient presents with    assisted Regulatory       HPI:    Maribel Sevilla is a 89 year old  (12/20/1931), who is being seen today for a federally mandated E/M visit at [facility] where she has resided since October 2011. She is enrolled with [facility].      Today, Ms. Sevilla is seen in her room sound asleep in bed. I did not wake her as she is a limited historian and was quite peaceful. Talked with nursing. Continues to yell out at times, but the addition of lorazepam has been helpful. No longer on sertraline and has had a GDR in quetiapine.    ALLERGIES:  No Known Allergies     Past Medical, Surgical, Family and Social History: Reviewed and updated in EPIC.    MEDICATIONS:  Current Outpatient Medications   Medication Sig Dispense Refill    ACE/ARB/ARNI NOT PRESCRIBED (INTENTIONAL) Please choose reason not prescribed from choices below.      acetaminophen (TYLENOL) 500 MG tablet Take 1,000 mg by mouth every 8 hours.      hyoscyamine (LEVSIN/SL) 0.125 MG sublingual tablet Place 1 tablet (0.125 mg) under the tongue every 4 hours as needed for cramping      lamoTRIgine (LAMICTAL) 200 MG tablet Take 1 tablet (200 mg) by mouth every morning      LORazepam (ATIVAN) 0.5 MG tablet Take 0.5 mg by mouth every 4 hours as needed for anxiety, nausea or agitation.      NEW MED Take 1 capsule by mouth 2 times daily [facility] (family provides)      oxyCODONE HCl (OXAYDO) 5 MG TABA Place 5 mg under the tongue every hour as needed (pain, dyspnea).      polyethylene glycol (MIRALAX) 17 g packet Take 1 packet by mouth daily as needed for constipation.      QUEtiapine (SEROQUEL) 50 MG tablet Take 150 mg by mouth at bedtime.      senna-docusate (SENOKOT-S/PERICOLACE) 8.6-50 MG tablet  "Take 1 tablet by mouth daily May also have 2 tablets po BID PRN       Medications reviewed:  Medications reconciled to facility chart and changes were made to reflect current medications as identified as above med list. Below are the changes that were made:   Medications stopped since last EPIC medication reconciliation:   Medications Discontinued During This Encounter   Medication Reason    oxyCODONE (ROXICODONE) 5 MG tablet Med Rec(No AVS / No eCancel)    sertraline (ZOLOFT) 100 MG tablet Med Rec(No AVS / No eCancel)    psyllium (METAMUCIL/KONSYL) 58.6 % powder Med Rec(No AVS / No eCancel)    pantoprazole (PROTONIX) 40 MG EC tablet Med Rec(No AVS / No eCancel)     Medications started since last [facility] medication reconciliation:  Orders Placed This Encounter   Medications    LORazepam (ATIVAN) 0.5 MG tablet     Sig: Take 0.5 mg by mouth every 4 hours as needed for anxiety, nausea or agitation.    oxyCODONE HCl (OXAYDO) 5 MG TABA     Sig: Place 5 mg under the tongue every hour as needed (pain, dyspnea).       REVIEW OF SYSTEMS:  Unable to be obtained due to cognitive impairment and she was asleep    PHYSICAL EXAM:  /65   Pulse 72   Temp 97.6  F (36.4  C)   Resp 17   Ht 1.575 m (5' 2\")   Wt 50.3 kg (111 lb)   SpO2 96%   BMI 20.30 kg/m    Gen: laying in bed, sound asleep and in no acute distress  Resp: breathing non labored, no tachypnea    LABS/IMAGING: [facility] Epic [facility]    ASSESSMENT / PLAN:    Bipolar Disorder  Lewy Body Dementia  Hospice Care Patient  Ongoing cyclical pattern to her mood and diet - lorazepam has, per nursing, been helpful with calling out. No longer on sertraline and had GDR in quetiapine since I last saw her.  Weight down about 12 lbs from a year ago.   -- lamotrigine 200 mg in the morning, quetiapine 250 mg at bedtime   -- lorazepam 0.5 mg q4h PRN   -- ongoing 24/7 nursing and supportive cares  -- appreciate the cares of the hospice team      CKD, Stage IV "   Baseline Cr low 3s -  most recent in Feb (is in Matrix).   -- avoid nephrotoxic agents  -- BMP per goals of care - is enrolled in hospice    Electronically signed by  Felecia Elizabeth MD

## 2024-11-04 ENCOUNTER — NURSING HOME VISIT (OUTPATIENT)
Dept: GERIATRICS | Facility: CLINIC | Age: 89
End: 2024-11-04
Payer: OTHER MISCELLANEOUS

## 2024-11-04 VITALS
DIASTOLIC BLOOD PRESSURE: 65 MMHG | BODY MASS INDEX: 20.43 KG/M2 | TEMPERATURE: 97.6 F | HEIGHT: 62 IN | OXYGEN SATURATION: 96 % | WEIGHT: 111 LBS | HEART RATE: 72 BPM | SYSTOLIC BLOOD PRESSURE: 118 MMHG | RESPIRATION RATE: 17 BRPM

## 2024-11-04 DIAGNOSIS — F31.9 BIPOLAR AFFECTIVE DISORDER, REMISSION STATUS UNSPECIFIED (H): Primary | ICD-10-CM

## 2024-11-04 DIAGNOSIS — N18.4 CKD (CHRONIC KIDNEY DISEASE) STAGE 4, GFR 15-29 ML/MIN (H): ICD-10-CM

## 2024-11-04 DIAGNOSIS — F02.818 LEWY BODY DEMENTIA WITH BEHAVIORAL DISTURBANCE (H): ICD-10-CM

## 2024-11-04 DIAGNOSIS — G31.83 LEWY BODY DEMENTIA WITH BEHAVIORAL DISTURBANCE (H): ICD-10-CM

## 2024-11-04 DIAGNOSIS — Z51.5 HOSPICE CARE PATIENT: ICD-10-CM

## 2024-11-04 PROCEDURE — 99309 SBSQ NF CARE MODERATE MDM 30: CPT | Mod: GV | Performed by: INTERNAL MEDICINE

## 2024-11-04 NOTE — LETTER
11/4/2024      Maribel Sevilla  [facility]  [address] [facility] [address]        [facility] GERIATRICS  REGULATORY VISIT  November 4, 2024      [facility] Medical Record Number:  [id number]  Place of Service where encounter took place:  [facility] () [[id number]]    Chief Complaint   Patient presents with     correction Regulatory       HPI:    Maribel Sevilla is a 89 year old  (12/20/1931), who is being seen today for a federally mandated E/M visit at [facility] where she has resided since October 2011. She is enrolled with [facility] hospice.      Today, Ms. Sevilla is seen in her room sound asleep in bed. I did not wake her as she is a limited historian and was quite peaceful. Talked with nursing. Continues to yell out at times, but the addition of lorazepam has been helpful. No longer on sertraline and has had a GDR in quetiapine.    ALLERGIES:  No Known Allergies     Past Medical, Surgical, Family and Social History: Reviewed and updated in EPIC.    MEDICATIONS:  Current Outpatient Medications   Medication Sig Dispense Refill     ACE/ARB/ARNI NOT PRESCRIBED (INTENTIONAL) Please choose reason not prescribed from choices below.       acetaminophen (TYLENOL) 500 MG tablet Take 1,000 mg by mouth every 8 hours.       hyoscyamine (LEVSIN/SL) 0.125 MG sublingual tablet Place 1 tablet (0.125 mg) under the tongue every 4 hours as needed for cramping       lamoTRIgine (LAMICTAL) 200 MG tablet Take 1 tablet (200 mg) by mouth every morning       LORazepam (ATIVAN) 0.5 MG tablet Take 0.5 mg by mouth every 4 hours as needed for anxiety, nausea or agitation.       NEW MED Take 1 capsule by mouth 2 times daily [facility] (family provides)       oxyCODONE HCl (OXAYDO) 5 MG TABA Place 5 mg under the tongue every hour as needed (pain, dyspnea).       polyethylene glycol (MIRALAX) 17 g packet Take 1 packet by mouth daily as needed for constipation.       QUEtiapine  "(SEROQUEL) 50 MG tablet Take 150 mg by mouth at bedtime.       senna-docusate (SENOKOT-S/PERICOLACE) 8.6-50 MG tablet Take 1 tablet by mouth daily May also have 2 tablets po BID PRN       Medications reviewed:  Medications reconciled to facility chart and changes were made to reflect current medications as identified as above med list. Below are the changes that were made:   Medications stopped since last EPIC medication reconciliation:   Medications Discontinued During This Encounter   Medication Reason     oxyCODONE (ROXICODONE) 5 MG tablet Med Rec(No AVS / No eCancel)     sertraline (ZOLOFT) 100 MG tablet Med Rec(No AVS / No eCancel)     psyllium (METAMUCIL/KONSYL) 58.6 % powder Med Rec(No AVS / No eCancel)     pantoprazole (PROTONIX) 40 MG EC tablet Med Rec(No AVS / No eCancel)     Medications started since last [facility] medication reconciliation:  Orders Placed This Encounter   Medications     LORazepam (ATIVAN) 0.5 MG tablet     Sig: Take 0.5 mg by mouth every 4 hours as needed for anxiety, nausea or agitation.     oxyCODONE HCl (OXAYDO) 5 MG TABA     Sig: Place 5 mg under the tongue every hour as needed (pain, dyspnea).       REVIEW OF SYSTEMS:  Unable to be obtained due to cognitive impairment and she was asleep    PHYSICAL EXAM:  /65   Pulse 72   Temp 97.6  F (36.4  C)   Resp 17   Ht 1.575 m (5' 2\")   Wt 50.3 kg (111 lb)   SpO2 96%   BMI 20.30 kg/m    Gen: laying in bed, sound asleep and in no acute distress  Resp: breathing non labored, no tachypnea    LABS/IMAGING: Reviewed as per Epic and/or [facility]    ASSESSMENT / PLAN:    Bipolar Disorder  Lewy Body Dementia  Hospice Care Patient  Ongoing cyclical pattern to her mood and diet - lorazepam has, per nursing, been helpful with calling out. No longer on sertraline and had GDR in quetiapine since I last saw her.  Weight down about 12 lbs from a year ago.   -- lamotrigine 200 mg in the morning, quetiapine 250 mg at bedtime   -- lorazepam 0.5 " mg q4h PRN   -- ongoing 24/7 nursing and supportive cares  -- appreciate the cares of the hospice team      CKD, Stage IV   Baseline Cr low 3s -  most recent in Feb (is in Matrix).   -- avoid nephrotoxic agents  -- BMP per goals of care - is enrolled in hospice    Electronically signed by  Felecia Elizabeth MD              Sincerely,        Felecia Elizabeth MD

## 2025-01-09 ENCOUNTER — NURSING HOME VISIT (OUTPATIENT)
Dept: GERIATRICS | Facility: CLINIC | Age: OVER 89
End: 2025-01-09
Payer: OTHER MISCELLANEOUS

## 2025-01-09 VITALS
OXYGEN SATURATION: 94 % | BODY MASS INDEX: 19.54 KG/M2 | HEART RATE: 86 BPM | DIASTOLIC BLOOD PRESSURE: 66 MMHG | RESPIRATION RATE: 19 BRPM | SYSTOLIC BLOOD PRESSURE: 101 MMHG | WEIGHT: 106.2 LBS | HEIGHT: 62 IN | TEMPERATURE: 98.3 F

## 2025-01-09 DIAGNOSIS — Z51.5 HOSPICE CARE PATIENT: ICD-10-CM

## 2025-01-09 DIAGNOSIS — N18.4 CKD (CHRONIC KIDNEY DISEASE) STAGE 4, GFR 15-29 ML/MIN (H): Primary | ICD-10-CM

## 2025-01-09 DIAGNOSIS — F31.9 BIPOLAR AFFECTIVE DISORDER, REMISSION STATUS UNSPECIFIED (H): ICD-10-CM

## 2025-01-09 DIAGNOSIS — K21.9 GASTROESOPHAGEAL REFLUX DISEASE WITHOUT ESOPHAGITIS: ICD-10-CM

## 2025-01-09 DIAGNOSIS — G31.83 LEWY BODY DEMENTIA WITH BEHAVIORAL DISTURBANCE (H): ICD-10-CM

## 2025-01-09 DIAGNOSIS — F02.818 LEWY BODY DEMENTIA WITH BEHAVIORAL DISTURBANCE (H): ICD-10-CM

## 2025-01-09 NOTE — PROGRESS NOTES
[facility] Regulatory  No chief complaint on file.  [place] MRN: [id number] Place of Service where encounter took place:  No question data found. HPI: Maribel Sevilla  is 89 year old (12/20/1931), who is being seen today for a federally mandated E/M visit.  HPI information obtained from: facility chart records, facility staff, patient report, [facility] chart review, and Care Everywhere [facility] chart review. T    ***    PMH/PSH, allergies reviewed in [facility] today.  MEDICATIONS:  Current Outpatient Medications   Medication Sig Dispense Refill    ACE/ARB/ARNI NOT PRESCRIBED (INTENTIONAL) Please choose reason not prescribed from choices below.      acetaminophen (TYLENOL) 500 MG tablet Take 1,000 mg by mouth every 8 hours.      hyoscyamine (LEVSIN/SL) 0.125 MG sublingual tablet Place 1 tablet (0.125 mg) under the tongue every 4 hours as needed for cramping      lamoTRIgine (LAMICTAL) 200 MG tablet Take 1 tablet (200 mg) by mouth every morning      LORazepam (ATIVAN) 0.5 MG tablet Take 0.5 mg by mouth every 4 hours as needed for anxiety, nausea or agitation.      NEW MED Take 1 capsule by mouth 2 times daily [facility] (family provides)      oxyCODONE HCl (OXAYDO) 5 MG TABA Place 5 mg under the tongue every hour as needed (pain, dyspnea).      polyethylene glycol (MIRALAX) 17 g packet Take 1 packet by mouth daily as needed for constipation.      QUEtiapine (SEROQUEL) 50 MG tablet Take 150 mg by mouth at bedtime.      senna-docusate (SENOKOT-S/PERICOLACE) 8.6-50 MG tablet Take 1 tablet by mouth daily May also have 2 tablets po BID PRN       ROS: Limited secondary to cognitive impairment but today pt reports the above and 4 point ROS including Respiratory, CV, GI and , other than that noted in the HPI,  is negative    Vitals: There were no vitals taken for this visit.  Exam:  GENERAL APPEARANCE: Alert, in no distress, cooperative.   ENT: Mouth/posterior oropharynx intact w/ moist mucous membranes,  hearing acuity [place]***.  EYES: EOM, conjunctivae, lids, pupils and irises normal, PERRL2.   RESP: Respiratory effort ***, no respiratory distress, Lung sounds clear.*** On RA. ***  CV: Auscultation of heart reveals S1, S2, rate*** and rhythm***, no*** murmur, no rub or gallop, Edema ***+ BLE. Peripheral pulses are 2+***.  ABDOMEN: Normal bowel sounds, soft, non-tender abdomen, and no masses palpated.  SKIN: Inspection/Palpation of skin and subcutaneous tissue baseline w/ fragility. No wounds/rashes noted. ***  NEURO: CN II-XII at patient's baseline, sensation baseline PPS.  PSYCH: Insight, judgement, and memory are ***, affect and mood are ***.    Lab/Diagnostic data: Recent labs in [facility] reviewed by me today.     ASSESSMENT/PLAN  {fgsdia}  ***Chronic. Ongoing***.  Provider reviewed records from facility, and interpreted most recent imaging/lab work (***), and vital signs.  Provider discussed/coordinated care w/ nursing, , and rehab team***:   ***  ***  ***  ***  ***  ***  Follow-up routinely or as needed.     Orders:  RENEW PRN Lorazepam x 60 days. Dx: anxiety.     Electronically signed by:  Dr. Jody Hare, APRN CNP DNP

## 2025-01-09 NOTE — LETTER
1/9/2025      Maribel Sevilla  C/o Mari Sevilla  [address]  [facility] [address]        [facility] Regulatory  No chief complaint on file.  [place] MRN: [id number] Place of Service where encounter took place:  No question data found. HPI: Maribel Sevilla  is 89 year old (12/20/1931), who is being seen today for a federally mandated E/M visit.  HPI information obtained from: facility chart records, facility staff, patient report, [facility] chart review, and Care Everywhere [facility] chart review. T    ***    PMH/PSH, allergies reviewed in [facility] today.  MEDICATIONS:  Current Outpatient Medications   Medication Sig Dispense Refill    ACE/ARB/ARNI NOT PRESCRIBED (INTENTIONAL) Please choose reason not prescribed from choices below.      acetaminophen (TYLENOL) 500 MG tablet Take 1,000 mg by mouth every 8 hours.      hyoscyamine (LEVSIN/SL) 0.125 MG sublingual tablet Place 1 tablet (0.125 mg) under the tongue every 4 hours as needed for cramping      lamoTRIgine (LAMICTAL) 200 MG tablet Take 1 tablet (200 mg) by mouth every morning      LORazepam (ATIVAN) 0.5 MG tablet Take 0.5 mg by mouth every 4 hours as needed for anxiety, nausea or agitation.      NEW MED Take 1 capsule by mouth 2 times daily [facility] (family provides)      oxyCODONE HCl (OXAYDO) 5 MG TABA Place 5 mg under the tongue every hour as needed (pain, dyspnea).      polyethylene glycol (MIRALAX) 17 g packet Take 1 packet by mouth daily as needed for constipation.      QUEtiapine (SEROQUEL) 50 MG tablet Take 150 mg by mouth at bedtime.      senna-docusate (SENOKOT-S/PERICOLACE) 8.6-50 MG tablet Take 1 tablet by mouth daily May also have 2 tablets po BID PRN       ROS: Limited secondary to cognitive impairment but today pt reports the above and 4 point ROS including Respiratory, CV, GI and , other than that noted in the HPI,  is negative    Vitals: There were no vitals taken for this visit.  Exam:  GENERAL APPEARANCE: Alert, in  no distress, cooperative.   ENT: Mouth/posterior oropharynx intact w/ moist mucous membranes, hearing acuity [place]***.  EYES: EOM, conjunctivae, lids, pupils and irises normal, PERRL2.   RESP: Respiratory effort ***, no respiratory distress, Lung sounds clear.*** On RA. ***  CV: Auscultation of heart reveals S1, S2, rate*** and rhythm***, no*** murmur, no rub or gallop, Edema ***+ BLE. Peripheral pulses are 2+***.  ABDOMEN: Normal bowel sounds, soft, non-tender abdomen, and no masses palpated.  SKIN: Inspection/Palpation of skin and subcutaneous tissue baseline w/ fragility. No wounds/rashes noted. ***  NEURO: CN II-XII at patient's baseline, sensation baseline PPS.  PSYCH: Insight, judgement, and memory are ***, affect and mood are ***.    Lab/Diagnostic data: Recent labs in [facility] reviewed by me today.     ASSESSMENT/PLAN  {fgsdia}  ***Chronic. Ongoing***.  Provider reviewed records from facility, and interpreted most recent imaging/lab work (***), and vital signs.  Provider discussed/coordinated care w/ nursing, , and rehab team***:   ***  ***  ***  ***  ***  ***  Follow-up routinely or as needed.     Orders:  RENEW PRN Lorazepam x 60 days. Dx: anxiety.     Electronically signed by:  ASCENCION Goff CNP DNP        Sincerely,        ASCENCION Henriquez CNP    Electronically signed

## 2025-02-05 ENCOUNTER — TELEPHONE (OUTPATIENT)
Dept: GERIATRICS | Facility: CLINIC | Age: OVER 89
End: 2025-02-05
Payer: MEDICARE

## 2025-02-05 NOTE — TELEPHONE ENCOUNTER
"[facility] Triage Call    Provider: ASCENCION Jackson CNP, DNP  Facility: [facility] Facility Type:  LTC    Caller: She   Call Back Number: [phone]    Allergies:  No Known Allergies     SBAR:     S-(situation): Today the nurse is calling to update that the patient has been refusing all of her medications for the last 4 days. Also the patient is only very small amounts each day.     B-(background): Pt has signed off of Hospice recently as the patient has refused all services.     A-(assessment): Vitals: BP:  77/51  P:: 81  R:: 23  SPO2: 94% R/A Temp.:  98.5   The patient is pleasant and stating that she doesn't want to take anything anymore.     Patient is DNR/DNI    R-(recommendations): more for an FYI. Any new orders?       Telephone encounter sent to:  ASCENCION Jackson CNP, DNP    Please send response/orders to \"Geriatrics Nurse Pool\"    Rupinder Dumont RN      "

## 2025-02-05 NOTE — TELEPHONE ENCOUNTER
[facility] Geriatrics   2025     Name: Maribel Sevilla   : 1931     Background:  Bipolar and Lewy Body dementia, recently signed off of hospice. Now refusing all medications. May have an exacerbated Bipolar situation.     Orders:  Seroquel 25mg PO x1 asap. Dx: Bipolar disorder.    After administration, re-attempt medication administration.  If this continues, provider can follow-up in person tomorrow for further assessment.     Electronically signed by:   ASCENCION Goff CNP DNP

## 2025-03-04 ENCOUNTER — NURSING HOME VISIT (OUTPATIENT)
Dept: GERIATRICS | Facility: CLINIC | Age: OVER 89
End: 2025-03-04
Payer: OTHER MISCELLANEOUS

## 2025-03-04 VITALS
HEIGHT: 62 IN | WEIGHT: 102 LBS | SYSTOLIC BLOOD PRESSURE: 124 MMHG | DIASTOLIC BLOOD PRESSURE: 66 MMHG | OXYGEN SATURATION: 96 % | BODY MASS INDEX: 18.77 KG/M2 | HEART RATE: 83 BPM | RESPIRATION RATE: 19 BRPM | TEMPERATURE: 97.4 F

## 2025-03-04 DIAGNOSIS — F02.818 LEWY BODY DEMENTIA WITH BEHAVIORAL DISTURBANCE (H): Primary | ICD-10-CM

## 2025-03-04 DIAGNOSIS — Z51.5 HOSPICE CARE PATIENT: ICD-10-CM

## 2025-03-04 DIAGNOSIS — F31.9 BIPOLAR AFFECTIVE DISORDER, REMISSION STATUS UNSPECIFIED (H): ICD-10-CM

## 2025-03-04 DIAGNOSIS — G31.83 LEWY BODY DEMENTIA WITH BEHAVIORAL DISTURBANCE (H): Primary | ICD-10-CM

## 2025-03-04 DIAGNOSIS — N18.4 CKD (CHRONIC KIDNEY DISEASE) STAGE 4, GFR 15-29 ML/MIN (H): ICD-10-CM

## 2025-03-04 RX ORDER — HYDROMORPHONE HYDROCHLORIDE 2 MG/1
1 TABLET ORAL EVERY 4 HOURS PRN
COMMUNITY

## 2025-03-04 RX ORDER — QUETIAPINE FUMARATE 50 MG/1
50 TABLET, FILM COATED ORAL EVERY MORNING
COMMUNITY

## 2025-03-04 NOTE — LETTER
3/4/2025      Maribel Sevilla  C/o Mari Sevilla  [address]  [facility] [address]        [facility] GERIATRICS  REGULATORY VISIT  March 4, 2025      [facility] Medical Record Number:  [id number]  Place of Service where encounter took place:  [facility] () [[id number]]    Chief Complaint   Patient presents with     retirement Regulatory       HPI:    Maribel Sevilla is a 89 year old  (12/20/1931), who is being seen today for a federally mandated E/M visit at [facility] where she has resided since October 2011. She is enrolled with [facility] hospice.      Today, Ms. Sevilla is seen in her room sound asleep in bed. She appears comfortable. No acute concerns today per nursing.     ALLERGIES:  No Known Allergies     Past Medical, Surgical, Family and Social History: Reviewed and updated in EPIC.    MEDICATIONS:  Current Outpatient Medications   Medication Sig Dispense Refill     ACE/ARB/ARNI NOT PRESCRIBED (INTENTIONAL) Please choose reason not prescribed from choices below.       HYDROmorphone (DILAUDID) 2 MG tablet Take 1 mg by mouth every 4 hours as needed for shortness of breath or pain.       hyoscyamine (LEVSIN/SL) 0.125 MG sublingual tablet Place 1 tablet (0.125 mg) under the tongue every 4 hours as needed for cramping       lamoTRIgine (LAMICTAL) 200 MG tablet Take 1 tablet (200 mg) by mouth every morning       LORazepam (ATIVAN) 0.5 MG tablet Take 0.5 mg by mouth every 4 hours as needed for anxiety, nausea or agitation.       NEW MED Take 1 capsule by mouth 2 times daily [facility] (family provides)       polyethylene glycol (MIRALAX) 17 g packet Take 1 packet by mouth daily as needed for constipation.       QUEtiapine (SEROQUEL) 50 MG tablet Take 50 mg by mouth every morning.       QUEtiapine (SEROQUEL) 50 MG tablet Take 200 mg by mouth at bedtime.       senna-docusate (SENOKOT-S/PERICOLACE) 8.6-50 MG tablet Take 1 tablet by mouth daily May also have 2 tablets po  "BID PRN       Medications reviewed:  Medications reconciled to facility chart and changes were made to reflect current medications as identified as above med list. Below are the changes that were made:   Medications stopped since last EPIC medication reconciliation:   Medications Discontinued During This Encounter   Medication Reason     oxyCODONE HCl (OXAYDO) 5 MG TABA Med Rec(No AVS / No eCancel)     acetaminophen (TYLENOL) 500 MG tablet Med Rec(No AVS / No eCancel)     Medications started since last [facility] medication reconciliation:  Orders Placed This Encounter   Medications     HYDROmorphone (DILAUDID) 2 MG tablet     Sig: Take 1 mg by mouth every 4 hours as needed for shortness of breath or pain.     QUEtiapine (SEROQUEL) 50 MG tablet     Sig: Take 50 mg by mouth every morning.       PHYSICAL EXAM:  /66   Pulse 83   Temp 97.4  F (36.3  C)   Resp 19   Ht 1.575 m (5' 2\")   Wt 46.3 kg (102 lb)   SpO2 96%   BMI 18.66 kg/m    Gen: laying in bed, sound asleep and in no acute distress  Resp: breathing non labored, no tachypnea    LABS[facility]IMAGING: [facility]    ASSESSMENT / PLAN:     Bipolar Disorder  Lewy Body Dementia  Hospice Care Patient  Has had cyclical pattern to her mood and diet. Weight 125 --> 114 --> 102 lbs since 2023.  -- lamotrigine 200 mg in the morning, quetiapine 250 mg at bedtime   -- lorazepam 0.5 mg q4h PRN   -- ongoing 24/7 nursing and supportive cares  -- appreciate the cares of the hospice team      CKD, Stage IV   Baseline Cr low 3s -  most recent in Feb (is in [facility]).   -- avoid nephrotoxic agents  -- BMP per goals of care - is enrolled in hospice    Electronically signed by  Felecia Elizabeth MD              Sincerely,        Felecia Elizabeth MD    Electronically signed  "

## 2025-03-05 NOTE — PROGRESS NOTES
[facility] GERIATRICS  REGULATORY VISIT  March 4, 2025      [facility] Medical Record Number:  [id number]  Place of Service where encounter took place:  [facility] () [[id number]]    Chief Complaint   Patient presents with    residential Regulatory       HPI:    Maribel Sevilla is a 89 year old  (12/20/1931), who is being seen today for a federally mandated E/M visit at [facility] where she has resided since October 2011. She is enrolled with [facility] hospice.      Today, Ms. Sevilla is seen in her room sound asleep in bed. She appears comfortable. No acute concerns today per nursing.     ALLERGIES:  No Known Allergies     Past Medical, Surgical, Family and Social History: Reviewed and updated in EPIC.    MEDICATIONS:  Current Outpatient Medications   Medication Sig Dispense Refill    ACE/ARB/ARNI NOT PRESCRIBED (INTENTIONAL) Please choose reason not prescribed from choices below.      HYDROmorphone (DILAUDID) 2 MG tablet Take 1 mg by mouth every 4 hours as needed for shortness of breath or pain.      hyoscyamine (LEVSIN/SL) 0.125 MG sublingual tablet Place 1 tablet (0.125 mg) under the tongue every 4 hours as needed for cramping      lamoTRIgine (LAMICTAL) 200 MG tablet Take 1 tablet (200 mg) by mouth every morning      LORazepam (ATIVAN) 0.5 MG tablet Take 0.5 mg by mouth every 4 hours as needed for anxiety, nausea or agitation.      NEW MED Take 1 capsule by mouth 2 times daily [facility] (family provides)      polyethylene glycol (MIRALAX) 17 g packet Take 1 packet by mouth daily as needed for constipation.      QUEtiapine (SEROQUEL) 50 MG tablet Take 50 mg by mouth every morning.      QUEtiapine (SEROQUEL) 50 MG tablet Take 200 mg by mouth at bedtime.      senna-docusate (SENOKOT-S/PERICOLACE) 8.6-50 MG tablet Take 1 tablet by mouth daily May also have 2 tablets po BID PRN       Medications reviewed:  Medications reconciled to facility chart and changes were made to  "reflect current medications as identified as above med list. Below are the changes that were made:   Medications stopped since last EPIC medication reconciliation:   Medications Discontinued During This Encounter   Medication Reason    oxyCODONE HCl (OXAYDO) 5 MG TABA Med Rec(No AVS / No eCancel)    acetaminophen (TYLENOL) 500 MG tablet Med Rec(No AVS / No eCancel)     Medications started since last [facility] medication reconciliation:  Orders Placed This Encounter   Medications    HYDROmorphone (DILAUDID) 2 MG tablet     Sig: Take 1 mg by mouth every 4 hours as needed for shortness of breath or pain.    QUEtiapine (SEROQUEL) 50 MG tablet     Sig: Take 50 mg by mouth every morning.       PHYSICAL EXAM:  /66   Pulse 83   Temp 97.4  F (36.3  C)   Resp 19   Ht 1.575 m (5' 2\")   Wt 46.3 kg (102 lb)   SpO2 96%   BMI 18.66 kg/m    Gen: laying in bed, sound asleep and in no acute distress  Resp: breathing non labored, no tachypnea    LABS/IMAGING: [facility]    ASSESSMENT / PLAN:     Bipolar Disorder  Lewy Body Dementia  Hospice Care Patient  Has had cyclical pattern to her mood and diet. Weight 125 --> 114 --> 102 lbs since 2023.  -- lamotrigine 200 mg in the morning, quetiapine 250 mg at bedtime   -- lorazepam 0.5 mg q4h PRN   -- ongoing 24/7 nursing and supportive cares  -- appreciate the cares of the hospice team      CKD, Stage IV   Baseline Cr low 3s -  most recent in Feb (is in [facility]).   -- avoid nephrotoxic agents  -- BMP per goals of care - is enrolled in hospice    Electronically signed by  Felecia Elizabeth MD          "

## 2025-03-12 ENCOUNTER — DOCUMENTATION ONLY (OUTPATIENT)
Dept: OTHER | Facility: CLINIC | Age: OVER 89
End: 2025-03-12
Payer: MEDICARE

## 2025-05-06 ENCOUNTER — NURSING HOME VISIT (OUTPATIENT)
Dept: GERIATRICS | Facility: CLINIC | Age: OVER 89
End: 2025-05-06
Payer: OTHER MISCELLANEOUS

## 2025-05-06 VITALS
BODY MASS INDEX: 18.58 KG/M2 | OXYGEN SATURATION: 96 % | WEIGHT: 101 LBS | HEART RATE: 72 BPM | HEIGHT: 62 IN | DIASTOLIC BLOOD PRESSURE: 65 MMHG | TEMPERATURE: 97.5 F | SYSTOLIC BLOOD PRESSURE: 122 MMHG | RESPIRATION RATE: 18 BRPM

## 2025-05-06 DIAGNOSIS — R29.6 RECURRENT FALLS: ICD-10-CM

## 2025-05-06 DIAGNOSIS — F33.2 SEVERE EPISODE OF RECURRENT MAJOR DEPRESSIVE DISORDER, WITHOUT PSYCHOTIC FEATURES (H): ICD-10-CM

## 2025-05-06 DIAGNOSIS — F31.9 BIPOLAR AFFECTIVE DISORDER, REMISSION STATUS UNSPECIFIED (H): ICD-10-CM

## 2025-05-06 DIAGNOSIS — Z51.5 HOSPICE CARE PATIENT: ICD-10-CM

## 2025-05-06 DIAGNOSIS — G31.83 LEWY BODY DEMENTIA WITH BEHAVIORAL DISTURBANCE (H): Primary | ICD-10-CM

## 2025-05-06 DIAGNOSIS — N18.4 CKD (CHRONIC KIDNEY DISEASE) STAGE 4, GFR 15-29 ML/MIN (H): ICD-10-CM

## 2025-05-06 DIAGNOSIS — F02.818 LEWY BODY DEMENTIA WITH BEHAVIORAL DISTURBANCE (H): Primary | ICD-10-CM

## 2025-05-06 PROCEDURE — 99308 SBSQ NF CARE LOW MDM 20: CPT | Mod: GV | Performed by: NURSE PRACTITIONER

## 2025-05-06 NOTE — PROGRESS NOTES
[facility] Regulatory  Chief Complaint   Patient presents with    [facility] Regulatory   [place] MRN: [id number] Place of Service where encounter took place:  [facility] () [[id number]] HPI: Maribel Sevilla  is 89 year old (12/20/1931), who is being seen today for a federally mandated E/M visit.  HPI information obtained from: facility chart records, facility staff, patient report, [facility] chart review, and Care Everywhere [facility] chart review.     Today, Ambar is seen as she is resting in bed before lunch.  She denies headaches, dizziness, shortness of breath, chest pain, nausea or heartburn.  She feels that her appetite is good.  She denies any constipation or diarrhea.  She denies any swelling.    PMH/PSH, allergies reviewed in [facility] today.  MEDICATIONS:  Current Outpatient Medications   Medication Sig Dispense Refill    ACE/ARB/ARNI NOT PRESCRIBED (INTENTIONAL) Please choose reason not prescribed from choices below.      HYDROmorphone (DILAUDID) 2 MG tablet Take 1 mg by mouth every 4 hours as needed for shortness of breath or pain.      hyoscyamine (LEVSIN/SL) 0.125 MG sublingual tablet Place 1 tablet (0.125 mg) under the tongue every 4 hours as needed for cramping      lamoTRIgine (LAMICTAL) 200 MG tablet Take 1 tablet (200 mg) by mouth every morning      LORazepam (ATIVAN) 0.5 MG tablet Take 0.5 mg by mouth every 4 hours as needed for anxiety, nausea or agitation.      NEW MED Take 1 capsule by mouth 2 times daily [facility] (family provides)      polyethylene glycol (MIRALAX) 17 g packet Take 1 packet by mouth daily as needed for constipation.      QUEtiapine (SEROQUEL) 50 MG tablet Take 50 mg by mouth every morning.      QUEtiapine (SEROQUEL) 50 MG tablet Take 200 mg by mouth at bedtime.      senna-docusate (SENOKOT-S/PERICOLACE) 8.6-50 MG tablet Take 1 tablet by mouth daily May also have 2 tablets po BID PRN       ROS: Limited secondary to cognitive impairment but today pt  "reports the above and 4 point ROS including Respiratory, CV, GI and , other than that noted in the HPI, is negative.  Vitals: /65   Pulse 72   Temp 97.5  F (36.4  C)   Resp 18   Ht 1.575 m (5' 2\")   Wt 45.8 kg (101 lb)   SpO2 96%   BMI 18.47 kg/m    Exam:  GENERAL APPEARANCE: Alert, in no distress, cooperative.   ENT: Mouth/posterior oropharynx intact w/ moist mucous membranes, hearing acuity [place].  EYES: EOM, conjunctivae, lids, pupils and irises normal, PERRL2.   RESP: Respiratory effort good, no respiratory distress, Lung sounds clear. On RA.   CV: Auscultation of heart reveals S1, S2, rate and rhythm regular, no murmur, no rub or gallop, Edema 0+ BLE.  ABDOMEN: Normal bowel sounds, soft, non-tender abdomen, and no masses palpated.  SKIN: Inspection/Palpation of skin and subcutaneous tissue baseline w/ fragility. No wounds/rashes noted.   NEURO: CN II-XII at patient's baseline, sensation baseline PPS.  PSYCH: Insight, judgement, and memory are baseline impaired, affect and mood are happy/engaged.    Lab/Diagnostic data: Recent labs in [employer] reviewed by me today.     ASSESSMENT/PLAN  Lewy body dementia with behavioral disturbance (H)  Bipolar affective disorder, remission status unspecified (H)  CKD (chronic kidney disease) stage 4, GFR 15-29 ml/min (H)  Severe episode of recurrent major depressive disorder, without psychotic features (H)  Recurrent falls  Hospice care patient  Chronic. Ongoing.  Provider reviewed records from facility, and interpreted most recent vital signs.  Provider discussed/coordinated care w/ nursing and :    and nursing both report that Ambar is enrolled in hospice, and there are no concerns.  There have been previous behaviors but current medication regimen helps control.  Underlying Lewy body, bipolar, depression, which may be reasoning behind occasional behaviors reported.  Continue regimen and defer to hospice should these " exacerbate.  Historical recurrent falls, but these have been lessened while on hospice.  Continue current plan of care.  Underlying CKD, which we will not trend with blood work given her age and goals of care.  Follow-up routinely or as needed.     Orders:  No new orders. Continue plan of care.    Electronically signed by:  Dr. Jody Hare, ASCENCION CNP DNP

## 2025-05-06 NOTE — LETTER
5/6/2025      Maribel Sevilla  C/o Mari Sevilla  [address]  [facility] [address]        [facility] Regulatory  Chief Complaint   Patient presents with     [facility] Regulatory   [place] MRN: [id number] Place of Service where encounter took place:  [facility] () [[id number]] HPI: Maribel Sevilla  is 89 year old (12/20/1931), who is being seen today for a federally mandated E/M visit.  HPI information obtained from: facility chart records, facility staff, patient report, [facility] chart review, and Care Everywhere [facility] chart review.     Today, Ambar is seen as she is resting in bed before lunch.  She denies headaches, dizziness, shortness of breath, chest pain, nausea or heartburn.  She feels that her appetite is good.  She denies any constipation or diarrhea.  She denies any swelling.    PMH/PSH, allergies reviewed in [facility] today.  MEDICATIONS:  Current Outpatient Medications   Medication Sig Dispense Refill     ACE/ARB/ARNI NOT PRESCRIBED (INTENTIONAL) Please choose reason not prescribed from choices below.       HYDROmorphone (DILAUDID) 2 MG tablet Take 1 mg by mouth every 4 hours as needed for shortness of breath or pain.       hyoscyamine (LEVSIN/SL) 0.125 MG sublingual tablet Place 1 tablet (0.125 mg) under the tongue every 4 hours as needed for cramping       lamoTRIgine (LAMICTAL) 200 MG tablet Take 1 tablet (200 mg) by mouth every morning       LORazepam (ATIVAN) 0.5 MG tablet Take 0.5 mg by mouth every 4 hours as needed for anxiety, nausea or agitation.       NEW MED Take 1 capsule by mouth 2 times daily [facility] (family provides)       polyethylene glycol (MIRALAX) 17 g packet Take 1 packet by mouth daily as needed for constipation.       QUEtiapine (SEROQUEL) 50 MG tablet Take 50 mg by mouth every morning.       QUEtiapine (SEROQUEL) 50 MG tablet Take 200 mg by mouth at bedtime.       senna-docusate (SENOKOT-S/PERICOLACE) 8.6-50 MG tablet Take 1 tablet by mouth  "daily May also have 2 tablets po BID PRN       ROS: Limited secondary to cognitive impairment but today pt reports the above and 4 point ROS including Respiratory, CV, GI and , other than that noted in the HPI, is negative.  Vitals: /65   Pulse 72   Temp 97.5  F (36.4  C)   Resp 18   Ht 1.575 m (5' 2\")   Wt 45.8 kg (101 lb)   SpO2 96%   BMI 18.47 kg/m    Exam:  GENERAL APPEARANCE: Alert, in no distress, cooperative.   ENT: Mouth/posterior oropharynx intact w/ moist mucous membranes, hearing acuity [place].  EYES: EOM, conjunctivae, lids, pupils and irises normal, PERRL2.   RESP: Respiratory effort good, no respiratory distress, Lung sounds clear. On RA.   CV: Auscultation of heart reveals S1, S2, rate and rhythm regular, no murmur, no rub or gallop, Edema 0+ BLE.  ABDOMEN: Normal bowel sounds, soft, non-tender abdomen, and no masses palpated.  SKIN: Inspection/Palpation of skin and subcutaneous tissue baseline w/ fragility. No wounds/rashes noted.   NEURO: CN II-XII at patient's baseline, sensation baseline PPS.  PSYCH: Insight, judgement, and memory are baseline impaired, affect and mood are happy/engaged.    Lab/Diagnostic data: Recent labs in [employer] reviewed by me today.     ASSESSMENT/PLAN  Lewy body dementia with behavioral disturbance (H)  Bipolar affective disorder, remission status unspecified (H)  CKD (chronic kidney disease) stage 4, GFR 15-29 ml/min (H)  Severe episode of recurrent major depressive disorder, without psychotic features (H)  Recurrent falls  Hospice care patient  Chronic. Ongoing.  Provider reviewed records from facility, and interpreted most recent vital signs.  Provider discussed/coordinated care w/ nursing and :    and nursing both report that Ambar is enrolled in hospice, and there are no concerns.  There have been previous behaviors but current medication regimen helps control.  Underlying Lewy body, bipolar, depression, which may be reasoning " behind occasional behaviors reported.  Continue regimen and defer to hospice should these exacerbate.  Historical recurrent falls, but these have been lessened while on hospice.  Continue current plan of care.  Underlying CKD, which we will not trend with blood work given her age and goals of care.  Follow-up routinely or as needed.     Orders:  No new orders. Continue plan of care.    Electronically signed by:  ASCENCION Goff CNP DNP        Sincerely,        ASCENCION Henriquez CNP    Electronically signed

## 2025-05-28 ENCOUNTER — TELEPHONE (OUTPATIENT)
Dept: GERIATRICS | Facility: CLINIC | Age: OVER 89
End: 2025-05-28
Payer: MEDICARE

## 2025-05-28 NOTE — TELEPHONE ENCOUNTER
"[facility]iatric[facility] Triage Call    Provider: ASCENCION Dowd CNP  Facility: [facility] Facility Type:  LTC    Caller: Melody ([facility])  Call Back Number: [phone]    Allergies:  No Known Allergies       SBAR:     S-(situation): [facility] nurse called and is wondering if PCP would be okay with discontinuing the Lamictal and Seroquel.  Patient has been skipping doses due to refusal or spitting out the medications.      B-(background): Lewy body dementia with behavioral disturbance (H)  Bipolar affective disorder, remission status unspecified (H)  CKD (chronic kidney disease) stage 4, GFR 15-29 ml/min (H)  Severe episode of recurrent major depressive disorder, without psychotic features (H)  Recurrent falls  Hospice care patient    A-(assessment):     R-(recommendations): Please advise       Telephone encounter sent to:  NIKA Cunningham    Please send response/orders to \"Geriatrics Nurse Pool\"    Martine Yeboah RN      "

## 2025-05-28 NOTE — TELEPHONE ENCOUNTER
[facility] Geriatrics   May 28, 2025     Name: Maribel Sevilla   : 1931     Background:  Refusing Lamictal and Seroquel.  On hospice    Orders:  Okay to discontinue Lamictal and Seroquel due to patient refusing and spitting it out.      Electronically signed by Stormy Delong NP

## 2025-07-03 ENCOUNTER — NURSING HOME VISIT (OUTPATIENT)
Dept: GERIATRICS | Facility: CLINIC | Age: OVER 89
End: 2025-07-03
Payer: MEDICARE

## 2025-07-03 VITALS
DIASTOLIC BLOOD PRESSURE: 64 MMHG | OXYGEN SATURATION: 96 % | TEMPERATURE: 97.6 F | BODY MASS INDEX: 17.19 KG/M2 | WEIGHT: 94 LBS | HEART RATE: 82 BPM | SYSTOLIC BLOOD PRESSURE: 110 MMHG | RESPIRATION RATE: 18 BRPM

## 2025-07-03 RX ORDER — QUETIAPINE FUMARATE 200 MG/1
200 TABLET, FILM COATED ORAL AT BEDTIME
COMMUNITY

## 2025-07-03 RX ORDER — QUETIAPINE FUMARATE 50 MG/1
50 TABLET, FILM COATED ORAL EVERY MORNING
COMMUNITY

## 2025-07-03 NOTE — LETTER
7/3/2025      Maribel Sevilla  C/o Mari Sevilla  [address]  [facility] [address]        No notes on file      Sincerely,        Felecia Elizabeth MD    Electronically signed

## 2025-07-03 NOTE — PROGRESS NOTES
[facility] GERIATRICS  REGULATORY VISIT  July 3, 2025      [facility] Medical Record Number:  [id number]  Place of Service where encounter took place:  [facility] () [[id number]]    Chief Complaint   Patient presents with    snf Regulatory       HPI:    Maribel Sevilla is a 89 year old  (12/20/1931), who is being seen today for a federally mandated E/M visit. HPI information obtained from: {FGS HPI:934772}.    Today, ***      ALLERGIES:  No Known Allergies     Past Medical, Surgical, Family and Social History: Reviewed and updated in EPIC.    MEDICATIONS:  Post Discharge Medication Reconciliation Status: {ACO Med Rec (Provider):841628}. ***    Current Outpatient Medications   Medication Sig Dispense Refill    ACE/ARB/ARNI NOT PRESCRIBED (INTENTIONAL) Please choose reason not prescribed from choices below.      HYDROmorphone (DILAUDID) 2 MG tablet Take 1 mg by mouth every 4 hours as needed for shortness of breath or pain.      hyoscyamine (LEVSIN/SL) 0.125 MG sublingual tablet Place 1 tablet (0.125 mg) under the tongue every 4 hours as needed for cramping      LORazepam (ATIVAN) 0.5 MG tablet Take 0.5 mg by mouth every 4 hours as needed for anxiety, nausea or agitation.      polyethylene glycol (MIRALAX) 17 g packet Take 1 packet by mouth daily as needed for constipation.      QUEtiapine (SEROQUEL) 200 MG tablet Take 200 mg by mouth at bedtime.      QUEtiapine (SEROQUEL) 50 MG tablet Take 50 mg by mouth every morning.      senna-docusate (SENOKOT-S/PERICOLACE) 8.6-50 MG tablet Take 1 tablet by mouth daily May also have 2 tablets po BID PRN       Medications reviewed:  Medications reconciled to facility chart and changes were made to reflect current medications as identified as above med list. Below are the changes that were made:   Medications stopped since last EPIC medication reconciliation:   Medications Discontinued During This Encounter   Medication Reason    NEW MED Therapy  completed (No AVS)     Medications started since last [facility] medication reconciliation:  Orders Placed This Encounter   Medications    QUEtiapine (SEROQUEL) 200 MG tablet     Sig: Take 200 mg by mouth at bedtime.    QUEtiapine (SEROQUEL) 50 MG tablet     Sig: Take 50 mg by mouth every morning.     ***    REVIEW OF SYSTEMS:  4 point ROS neg other than the symptoms noted above in the HPI.***  Unable to be obtained due to cognitive impairment or aphasia.     PHYSICAL EXAM:  /64   Pulse 82   Temp 97.6  F (36.4  C)   Resp 18   Wt 42.6 kg (94 lb)   SpO2 96%   BMI 17.19 kg/m    Gen: sitting in bed ***wheelchair, alert, cooperative and in no acute distress  HEENT: *** hearing acuity  Card: RRR, S1, S2, no murmurs  Resp: lungs clear to auscultation bilaterally ***anteriorly and laterally, no crackles or wheezes; moving good air  GI: abdomen soft, not-tender, non-distended, +BS  Ext: no LE edema  Neuro: CX II-XII grossly in tact; ROM in all four extremities grossly in tact  Psych: alert and oriented x3; normal affect ***memory, judgement and insight impaired ***alert and oriented to self and general situation    LABS/IMAGING: Reviewed as per Epic and/or [facility]    ASSESSMENT / PLAN:  {FGS DX:781646}    Orders:  ***    Electronically signed by  Felecia Elizabeth MD

## 2025-09-04 ENCOUNTER — NURSING HOME VISIT (OUTPATIENT)
Dept: GERIATRICS | Facility: CLINIC | Age: OVER 89
End: 2025-09-04
Payer: OTHER MISCELLANEOUS

## 2025-09-04 VITALS
BODY MASS INDEX: 15.91 KG/M2 | TEMPERATURE: 98.5 F | OXYGEN SATURATION: 97 % | RESPIRATION RATE: 16 BRPM | SYSTOLIC BLOOD PRESSURE: 98 MMHG | DIASTOLIC BLOOD PRESSURE: 58 MMHG | HEART RATE: 82 BPM | WEIGHT: 87 LBS

## 2025-09-04 DIAGNOSIS — F02.818 LEWY BODY DEMENTIA WITH BEHAVIORAL DISTURBANCE (H): Primary | ICD-10-CM

## 2025-09-04 DIAGNOSIS — Z51.5 HOSPICE CARE PATIENT: ICD-10-CM

## 2025-09-04 DIAGNOSIS — F31.9 BIPOLAR AFFECTIVE DISORDER, REMISSION STATUS UNSPECIFIED (H): ICD-10-CM

## 2025-09-04 DIAGNOSIS — G31.83 LEWY BODY DEMENTIA WITH BEHAVIORAL DISTURBANCE (H): Primary | ICD-10-CM
